# Patient Record
Sex: MALE | Race: BLACK OR AFRICAN AMERICAN | NOT HISPANIC OR LATINO | Employment: PART TIME | URBAN - METROPOLITAN AREA
[De-identification: names, ages, dates, MRNs, and addresses within clinical notes are randomized per-mention and may not be internally consistent; named-entity substitution may affect disease eponyms.]

---

## 2017-08-30 ENCOUNTER — CLINICAL SUPPORT (OUTPATIENT)
Dept: OCCUPATIONAL MEDICINE | Facility: CLINIC | Age: 52
End: 2017-08-30

## 2017-08-30 DIAGNOSIS — Z11.1 VISIT FOR TB SKIN TEST: Primary | ICD-10-CM

## 2017-08-30 PROCEDURE — 86580 TB INTRADERMAL TEST: CPT | Mod: S$GLB,,,

## 2017-09-01 ENCOUNTER — CLINICAL SUPPORT (OUTPATIENT)
Dept: OCCUPATIONAL MEDICINE | Facility: CLINIC | Age: 52
End: 2017-09-01

## 2017-09-01 DIAGNOSIS — R76.11 PPD POSITIVE: Primary | ICD-10-CM

## 2018-06-13 ENCOUNTER — OFFICE VISIT (OUTPATIENT)
Dept: URGENT CARE | Facility: CLINIC | Age: 53
End: 2018-06-13
Payer: MEDICAID

## 2018-06-13 VITALS
WEIGHT: 228 LBS | HEART RATE: 78 BPM | TEMPERATURE: 98 F | OXYGEN SATURATION: 97 % | DIASTOLIC BLOOD PRESSURE: 86 MMHG | SYSTOLIC BLOOD PRESSURE: 128 MMHG

## 2018-06-13 DIAGNOSIS — F17.200 CURRENT SMOKER: ICD-10-CM

## 2018-06-13 DIAGNOSIS — M54.41 ACUTE BILATERAL LOW BACK PAIN WITH BILATERAL SCIATICA: Primary | ICD-10-CM

## 2018-06-13 DIAGNOSIS — K21.9 GASTROESOPHAGEAL REFLUX DISEASE, ESOPHAGITIS PRESENCE NOT SPECIFIED: ICD-10-CM

## 2018-06-13 DIAGNOSIS — M54.42 ACUTE BILATERAL LOW BACK PAIN WITH BILATERAL SCIATICA: Primary | ICD-10-CM

## 2018-06-13 PROCEDURE — 99203 OFFICE O/P NEW LOW 30 MIN: CPT | Mod: S$GLB,,, | Performed by: NURSE PRACTITIONER

## 2018-06-13 RX ORDER — DEXAMETHASONE SODIUM PHOSPHATE 100 MG/10ML
7 INJECTION INTRAMUSCULAR; INTRAVENOUS
Status: COMPLETED | OUTPATIENT
Start: 2018-06-13 | End: 2018-06-13

## 2018-06-13 RX ORDER — NAPROXEN 500 MG/1
500 TABLET ORAL 2 TIMES DAILY WITH MEALS
Qty: 28 TABLET | Refills: 0 | Status: SHIPPED | OUTPATIENT
Start: 2018-06-13 | End: 2018-06-27

## 2018-06-13 RX ADMIN — DEXAMETHASONE SODIUM PHOSPHATE 7 MG: 100 INJECTION INTRAMUSCULAR; INTRAVENOUS at 03:06

## 2018-06-13 NOTE — PROGRESS NOTES
Subjective:       Patient ID: Braeden Rowland is a 52 y.o. male.    Vitals:  weight is 103.4 kg (228 lb). His temperature is 97.9 °F (36.6 °C). His blood pressure is 128/86 and his pulse is 78. His oxygen saturation is 97%.     Chief Complaint: Back Pain    Pt has lower back/hip pain that radiates down both legs. Pt says it aches. Pt has hx of pinched nerves and says it feels like that.      Back Pain   This is a new problem. Episode onset: 3-4 days ago. The problem has been gradually worsening since onset. The quality of the pain is described as aching. The pain radiates to the right knee and left knee. The patient is experiencing no pain. The symptoms are aggravated by lying down. Pertinent negatives include no abdominal pain, chest pain, fever or headaches. He has tried NSAIDs for the symptoms. The treatment provided no relief.     Review of Systems   Constitution: Negative for chills and fever.   HENT: Negative for sore throat.    Eyes: Negative for blurred vision.   Cardiovascular: Negative for chest pain.   Respiratory: Negative for shortness of breath.    Skin: Negative for rash.   Musculoskeletal: Positive for back pain. Negative for joint pain.   Gastrointestinal: Negative for abdominal pain, diarrhea, nausea and vomiting.   Neurological: Negative for headaches.   Psychiatric/Behavioral: The patient is not nervous/anxious.    All other systems reviewed and are negative.      Objective:      Physical Exam   Constitutional: He is oriented to person, place, and time. Vital signs are normal. He appears well-developed and well-nourished. He is cooperative.  Non-toxic appearance. He does not have a sickly appearance. He does not appear ill. No distress.   HENT:   Head: Normocephalic and atraumatic.   Right Ear: External ear normal.   Left Ear: External ear normal.   Nose: Nose normal.   Eyes: Conjunctivae and lids are normal.   Neck: Normal range of motion and full passive range of motion without pain. Neck  supple.   Cardiovascular: Normal rate, regular rhythm, normal heart sounds and intact distal pulses.    Pulmonary/Chest: Effort normal and breath sounds normal. No accessory muscle usage. No apnea, no tachypnea and no bradypnea. No respiratory distress. He has no decreased breath sounds. He has no wheezes. He has no rhonchi. He has no rales.   Abdominal: Normal appearance.   Musculoskeletal:        Lumbar back: He exhibits pain. He exhibits normal range of motion, no tenderness, no bony tenderness, no swelling, no edema, no deformity, no laceration, no spasm and normal pulse.        Back:    Neurological: He is alert and oriented to person, place, and time.   Skin: Skin is warm. Capillary refill takes less than 2 seconds. He is not diaphoretic.   Psychiatric: He has a normal mood and affect. His speech is normal and behavior is normal.   Nursing note and vitals reviewed.      Assessment:       1. Acute bilateral low back pain with bilateral sciatica    2. Current smoker    3. Gastroesophageal reflux disease, esophagitis presence not specified        Plan:         Acute bilateral low back pain with bilateral sciatica  -     dexamethasone injection 7 mg; Inject 0.7 mLs (7 mg total) into the muscle one time.  -     naproxen (EC NAPROSYN) 500 MG EC tablet; Take 1 tablet (500 mg total) by mouth 2 (two) times daily with meals.  Dispense: 28 tablet; Refill: 0    Current smoker  -     Ambulatory referral to Smoking Cessation Program    Gastroesophageal reflux disease, esophagitis presence not specified  -     ranitidine (ZANTAC) 150 MG tablet; Take 1 tablet (150 mg total) by mouth nightly.  Dispense: 14 tablet; Refill: 0

## 2018-06-13 NOTE — PATIENT INSTRUCTIONS
Please follow up with your primary care provider if you are not feeling better in 7-10 days.    Please drink plenty of fluids.  Please get plenty of rest.    Please return here or go to the Emergency Department for any concerns or worsening of condition.    If you lose control of your bowel and/or bladder, please go to the nearest Emergency Department immediately.    If you lose sensation in between your legs by your genitalia and/or rectum, please go to the nearest Emergency Department immediately.    If you lose control or sensation of any extremity, please go to the nearest Emergency Department immediately.    Please follow up with your primary care doctor or specialist as needed.    If you  smoke, please stop smoking.    Sciatica    Sciatica is a condition that causes pain in the lower back that spreads down into the buttock, hip, and leg. Sometimes the leg pain can happen without any back pain. Sciatica happens when a spinal nerve is irritated or has pressure put on it as comes out of the spinal canal in the lower back. This most often happens when a bulge or rupture of a nearby spinal disk presses on the nerve. Sciatica can also be caused by a narrowing of the spinal canal (spinal stenosis) or spasm of the muscle in the buttocks that the sciatic nerve passes through (pyriform muscle). Sciatica is also called lumbar radiculopathy.  Sciatica may begin after a sudden twisting or bending force, such as in a car accident. Or it can happen after a simple awkward movement. In either case, muscle spasm often also happens. Muscle spasm makes the pain worse.  A healthcare provider makes a diagnosis of sciatica from your symptoms and a physical exam. Unless you had an injury from a car accident or fall, you usually wont have X-rays taken at this time. This is because the nerves and disks in your back cant be seen on an X-ray. If the provider sees signs of a compressed nerve, you will need to schedule an MRI scan as an  outpatient. Signs of a compressed nerve include loss of strength in a leg.  Most sciatica gets better with medicine, exercise, and physical therapy. If your symptoms continue after at least 3 months of medical treatment, you may need surgery or injections to your lower back.  Home care  Follow these tips when caring for yourself at home:  · You may need to stay in bed the first few days. But as soon as possible, begin sitting up or walking. This will help you avoid problems that come from staying in bed for long periods.  · When in bed, try to find a position that is comfortable. A firm mattress is best. Try lying flat on your back with pillows under your knees. You can also try lying on your side with your knees bent up toward your chest and a pillow between your knees.  · Avoid sitting for long periods. This puts more stress on your lower back than standing or walking.  · Use heat from a hot shower, hot bath, or heating pad to help ease pain. Massage can also help. You can also try using an ice pack. You can make your own ice pack by putting ice cubes in a plastic bag. Wrap the bag in a thin towel. Try both heat and cold to see which works best. Use the method that feels best for 20 minutes several times a day.  · You may use acetaminophen or ibuprofen to ease pain, unless another pain medicine was prescribed. Note: If you have chronic liver or kidney disease, talk with your healthcare provider before taking these medicines. Also talk with your provider if youve had a stomach ulcer or gastrointestinal bleeding.  · Use safe lifting methods. Dont lift anything heavier than 15 pounds until all of the pain is gone.  Follow-up care  Follow up with your healthcare provider, or as advised. You may need physical therapy or additional tests.  If X-rays were taken, a radiologist will look at them. You will be told of any new findings that may affect your care.  When to seek medical advice  Call your healthcare provider  right away if any of these occur:  · Pain gets worse even after taking prescribed medicine  · Weakness or numbness in 1 or both legs or hips  · Numbness in your groin or genital area  · You cant control your bowel or bladder  · Fever  · Redness or swelling over your back or spine   Date Last Reviewed: 8/1/2016  © 2628-0656 [employer]. [address] [place] [address], [place] [address]. All rights reserved. This information is not intended as a substitute for professional medical care. Always follow your healthcare professional's instructions.

## 2019-01-07 ENCOUNTER — OFFICE VISIT (OUTPATIENT)
Dept: URGENT CARE | Facility: CLINIC | Age: 54
End: 2019-01-07
Payer: MEDICAID

## 2019-01-07 VITALS
WEIGHT: 210 LBS | TEMPERATURE: 98 F | OXYGEN SATURATION: 97 % | BODY MASS INDEX: 29.4 KG/M2 | DIASTOLIC BLOOD PRESSURE: 79 MMHG | SYSTOLIC BLOOD PRESSURE: 120 MMHG | HEIGHT: 71 IN | HEART RATE: 76 BPM

## 2019-01-07 DIAGNOSIS — K21.9 GASTROESOPHAGEAL REFLUX DISEASE WITHOUT ESOPHAGITIS: Primary | ICD-10-CM

## 2019-01-07 DIAGNOSIS — R09.82 POST-NASAL DRIP: ICD-10-CM

## 2019-01-07 DIAGNOSIS — B37.2 YEAST INFECTION OF THE SKIN: ICD-10-CM

## 2019-01-07 PROCEDURE — 99214 PR OFFICE/OUTPT VISIT, EST, LEVL IV, 30-39 MIN: ICD-10-PCS | Mod: S$GLB,,, | Performed by: NURSE PRACTITIONER

## 2019-01-07 PROCEDURE — 99214 OFFICE O/P EST MOD 30 MIN: CPT | Mod: S$GLB,,, | Performed by: NURSE PRACTITIONER

## 2019-01-07 RX ORDER — FLUTICASONE PROPIONATE 50 MCG
1 SPRAY, SUSPENSION (ML) NASAL 2 TIMES DAILY
Qty: 1 BOTTLE | Refills: 0 | Status: SHIPPED | OUTPATIENT
Start: 2019-01-07 | End: 2020-03-06 | Stop reason: ALTCHOICE

## 2019-01-07 RX ORDER — CLOTRIMAZOLE 1 %
CREAM (GRAM) TOPICAL 2 TIMES DAILY
Qty: 45 G | Refills: 0 | Status: SHIPPED | OUTPATIENT
Start: 2019-01-07 | End: 2020-03-06 | Stop reason: ALTCHOICE

## 2019-01-07 NOTE — PROGRESS NOTES
"Subjective:       Patient ID: Braeden Rowland is a 53 y.o. male.    Vitals:  height is 5' 11" (1.803 m) and weight is 95.3 kg (210 lb). His temperature is 97.6 °F (36.4 °C). His blood pressure is 120/79 and his pulse is 76. His oxygen saturation is 97%.     Chief Complaint: Abdominal Pain    Pt reports upper abd pain that began two weeks ago. Pt reports it is worse after eating heavy meals. Pt denies any n/v/d. Pt also reports a yeast infection "between his legs".      Abdominal Pain   This is a new problem. The current episode started 1 to 4 weeks ago. The onset quality is sudden. The problem occurs intermittently. The problem has been unchanged. The pain is located in the epigastric region. The pain is at a severity of 5/10. The pain is mild. The quality of the pain is aching and burning. Pertinent negatives include no constipation, diarrhea, dysuria, fever, nausea or vomiting. Nothing aggravates the pain. The pain is relieved by nothing. He has tried nothing for the symptoms. There is no history of abdominal surgery.       Constitution: Negative for appetite change, chills, sweating and fever.   HENT: Positive for sore throat.    Cardiovascular: Negative for chest pain.   Respiratory: Negative for shortness of breath.    Gastrointestinal: Positive for abdominal pain. Negative for abdominal trauma, abdominal bloating, history of abdominal surgery, nausea, vomiting, constipation, diarrhea, dark colored stools and heartburn.   Genitourinary: Negative for dysuria and pelvic pain.   Musculoskeletal: Negative for back pain.   Skin: Positive for erythema.       Objective:      Physical Exam   Constitutional: He is oriented to person, place, and time. He appears well-developed and well-nourished.   HENT:   Head: Normocephalic and atraumatic. Head is without abrasion, without contusion and without laceration.   Right Ear: External ear normal.   Left Ear: External ear normal.   Nose: Nose normal.   Mouth/Throat: Mucous " membranes are normal. Posterior oropharyngeal erythema present. No oropharyngeal exudate or posterior oropharyngeal edema.   +cobblestoning    Eyes: Conjunctivae, EOM and lids are normal. Pupils are equal, round, and reactive to light.   Neck: Trachea normal, full passive range of motion without pain and phonation normal. Neck supple.   Cardiovascular: Normal rate, regular rhythm and normal heart sounds.   Pulmonary/Chest: Effort normal and breath sounds normal. No stridor. No respiratory distress.   Abdominal: Soft. Normal appearance and bowel sounds are normal. He exhibits no distension, no abdominal bruit, no pulsatile midline mass and no mass. There is no tenderness. There is no rigidity, no rebound, no guarding, no CVA tenderness, no tenderness at McBurney's point and negative Peters's sign.       -TTP   Musculoskeletal: Normal range of motion. He exhibits no edema.   Neurological: He is alert and oriented to person, place, and time. He has normal strength.   Skin: Skin is warm, dry and intact. Capillary refill takes less than 2 seconds. No abrasion, no bruising, no burn, no ecchymosis, no laceration, no lesion and no rash noted. He is not diaphoretic. There is erythema. No pallor.        Psychiatric: He has a normal mood and affect. His speech is normal and behavior is normal. Judgment and thought content normal. Cognition and memory are normal.   Nursing note and vitals reviewed.      Assessment:       1. Gastroesophageal reflux disease without esophagitis    2. Yeast infection of the skin    3. Post-nasal drip        Plan:         Gastroesophageal reflux disease without esophagitis  -     ranitidine (ZANTAC) 150 MG tablet; Take 1 tablet (150 mg total) by mouth nightly.  Dispense: 30 tablet; Refill: 0    Yeast infection of the skin  -     clotrimazole (LOTRIMIN) 1 % cream; Apply topically 2 (two) times daily.  Dispense: 45 g; Refill: 0    Post-nasal drip  -     fluticasone (FLONASE) 50 mcg/actuation nasal  spray; 1 spray (50 mcg total) by Each Nare route 2 (two) times daily.  Dispense: 1 Bottle; Refill: 0      Patient Instructions       USE CREAM TWICE A DAY- KEEP AREA DRY    TAKE ZANTAC AT NIGHT FOR REFLEX    USE NASAL SPRAY MORNING AND NIGHT    FOLLOW UP WITH PCP    GERD (Adult)    The esophagus is a tube that carries food from the mouth to the stomach. A valve at the lower end of the esophagus prevents stomach acid from flowing upward. When this valve doesn't work properly, stomach contents may repeatedly flow back up (reflux) into the esophagus. This is called gastroesophageal reflux disease (GERD). GERD can irritate the esophagus. It can cause problems with swallowing or breathing. In severe cases, GERD can cause recurrent pneumonia or other serious problems.  Symptoms of reflux include burning, pressure or sharp pain in the upper abdomen or mid to lower chest. The pain can spread to the neck, back, or shoulder. There may be belching, an acid taste in the back of the throat, chronic cough, or sore throat or hoarseness. GERD symptoms often occur during the day after a big meal. They can also occur at night when lying down.   Home care  Lifestyle changes can help reduce symptoms. If needed, medicines may be prescribed. Symptoms often improve with treatment, but if treatment is stopped, the symptoms often return after a few months. So most persons with GERD will need to continue treatment.  Lifestyle changes  · Limit or avoid fatty, fried, and spicy foods, as well as coffee, chocolate, mint, and foods with high acid content such as tomatoes and citrus fruit and juices (orange, grapefruit, lemon).  · Dont eat large meals, especially at night. Frequent, smaller meals are best. Do not lie down right after eating. And dont eat anything 3 hours before going to bed.  · Avoid drinking alcohol and smoking. As much as possible, stay away from second hand smoke.  · If you are overweight, losing weight will reduce  "symptoms.   · Avoid wearing tight clothing around your stomach area.  · If your symptoms occur during sleep, use a foam wedge to elevate your upper body (not just your head.) Or, place 4" blocks under the head of your bed.  Medicines  If needed, medicines can help relieve the symptoms of GERD and prevent damage to the esophagus. Discuss a medicine plan with your healthcare provider. This may include one or more of the following medicines:  · Antacids to help neutralize the normal acids in your stomach.  · Acid blockers (H2 blockers) to decrease acid production.  · Acid inhibitors (PPIs) to decrease acid production in a different way than the blockers. They may work better, but can take a little longer to take effect.  Take an antacid 30-60 minutes after eating and at bedtime, but not at the same time as an acid blocker.  Try not to take medicines such as ibuprofen and aspirin. If you are taking aspirin for your heart or other medical reasons, talk to your healthcare provider about stopping it.  Follow-up care  Follow up with your healthcare provider or as advised by our staff.  When to seek medical advice  Call your healthcare provider if any of the following occur:  · Stomach pain gets worse or moves to the lower right abdomen (appendix area)  · Chest pain appears or gets worse, or spreads to the back, neck, shoulder, or arm  · Frequent vomiting (cant keep down liquids)  · Blood in the stool or vomit (red or black in color)  · Feeling weak or dizzy  · Fever of 100.4ºF (38ºC) or higher, or as directed by your healthcare provider  Date Last Reviewed: 6/23/2015  © 2196-8931 [employer], [employer] [address] [place] [address], [place] [address]. All rights reserved. This information is not intended as a substitute for professional medical care. Always follow your healthcare professional's instructions.        Tips to Control Acid Reflux    To control acid reflux, youll need to make some basic diet and lifestyle " changes. The simple steps outlined below may be all youll need to ease discomfort.  Watch what you eat  · Avoid fatty foods and spicy foods.  · Eat fewer acidic foods, such as citrus and tomato-based foods. These can increase symptoms.  · Limit drinking alcohol, caffeine, and fizzy beverages. All increase acid reflux.  · Try limiting chocolate, peppermint, and spearmint. These can worsen acid reflux in some people.  Watch when you eat  · Avoid lying down for 3 hours after eating.  · Do not snack before going to bed.  Raise your head  Raising your head and upper body by 4 to 6 inches helps limit reflux when youre lying down. Put blocks under the head of your bed frame to raise it.  Other changes  · Lose weight, if you need to  · Dont exercise near bedtime  · Avoid tight-fitting clothes  · Limit aspirin and ibuprofen  · Stop smoking   Date Last Reviewed: 7/1/2016  © 1776-6602 [employer], [employer]. [address] [place] [address], [place] [address]. All rights reserved. This information is not intended as a substitute for professional medical care. Always follow your healthcare professional's instructions.

## 2019-02-02 DIAGNOSIS — R09.82 POST-NASAL DRIP: ICD-10-CM

## 2019-02-02 RX ORDER — FLUTICASONE PROPIONATE 50 MCG
SPRAY, SUSPENSION (ML) NASAL
Qty: 15.8 ML | Refills: 0 | OUTPATIENT
Start: 2019-02-02

## 2019-02-06 DIAGNOSIS — K21.9 GASTROESOPHAGEAL REFLUX DISEASE WITHOUT ESOPHAGITIS: ICD-10-CM

## 2019-09-18 ENCOUNTER — HOSPITAL ENCOUNTER (EMERGENCY)
Facility: HOSPITAL | Age: 54
Discharge: HOME | End: 2019-09-18
Attending: EMERGENCY MEDICINE
Payer: MEDICAID

## 2019-09-18 VITALS
HEART RATE: 57 BPM | DIASTOLIC BLOOD PRESSURE: 72 MMHG | HEIGHT: 71 IN | BODY MASS INDEX: 35 KG/M2 | RESPIRATION RATE: 18 BRPM | SYSTOLIC BLOOD PRESSURE: 124 MMHG | OXYGEN SATURATION: 98 % | TEMPERATURE: 98 F | WEIGHT: 250 LBS

## 2019-09-18 DIAGNOSIS — R07.9 CHEST PAIN, UNSPECIFIED TYPE: Primary | ICD-10-CM

## 2019-09-18 DIAGNOSIS — R07.9 CHEST PAIN: ICD-10-CM

## 2019-09-18 LAB
ALBUMIN SERPL BCP-MCNC: 4 G/DL (ref 3.5–5.2)
ALBUMIN SERPL BCP-MCNC: 4.1 G/DL (ref 3.5–5.2)
ALP SERPL-CCNC: 70 U/L (ref 55–135)
ALP SERPL-CCNC: 78 U/L (ref 55–135)
ALT SERPL W/O P-5'-P-CCNC: 16 U/L (ref 10–44)
ALT SERPL W/O P-5'-P-CCNC: 17 U/L (ref 10–44)
ANION GAP SERPL CALC-SCNC: 8 MMOL/L (ref 8–16)
ANION GAP SERPL CALC-SCNC: 8 MMOL/L (ref 8–16)
AST SERPL-CCNC: 17 U/L (ref 10–40)
AST SERPL-CCNC: 18 U/L (ref 10–40)
BASOPHILS # BLD AUTO: 0.03 K/UL (ref 0–0.2)
BASOPHILS NFR BLD: 0.4 % (ref 0–1.9)
BILIRUB SERPL-MCNC: 0.2 MG/DL (ref 0.1–1)
BILIRUB SERPL-MCNC: 0.3 MG/DL (ref 0.1–1)
BNP SERPL-MCNC: <10 PG/ML (ref 0–99)
BUN SERPL-MCNC: 8 MG/DL (ref 6–20)
BUN SERPL-MCNC: 9 MG/DL (ref 6–20)
CALCIUM SERPL-MCNC: 10.2 MG/DL (ref 8.7–10.5)
CALCIUM SERPL-MCNC: 9.8 MG/DL (ref 8.7–10.5)
CHLORIDE SERPL-SCNC: 103 MMOL/L (ref 95–110)
CHLORIDE SERPL-SCNC: 104 MMOL/L (ref 95–110)
CO2 SERPL-SCNC: 28 MMOL/L (ref 23–29)
CO2 SERPL-SCNC: 30 MMOL/L (ref 23–29)
CREAT SERPL-MCNC: 1 MG/DL (ref 0.5–1.4)
CREAT SERPL-MCNC: 1 MG/DL (ref 0.5–1.4)
DIFFERENTIAL METHOD: NORMAL
EOSINOPHIL # BLD AUTO: 0.1 K/UL (ref 0–0.5)
EOSINOPHIL NFR BLD: 1.3 % (ref 0–8)
ERYTHROCYTE [DISTWIDTH] IN BLOOD BY AUTOMATED COUNT: 12.8 % (ref 11.5–14.5)
EST. GFR  (AFRICAN AMERICAN): >60 ML/MIN/1.73 M^2
EST. GFR  (AFRICAN AMERICAN): >60 ML/MIN/1.73 M^2
EST. GFR  (NON AFRICAN AMERICAN): >60 ML/MIN/1.73 M^2
EST. GFR  (NON AFRICAN AMERICAN): >60 ML/MIN/1.73 M^2
GLUCOSE SERPL-MCNC: 103 MG/DL (ref 70–110)
GLUCOSE SERPL-MCNC: 97 MG/DL (ref 70–110)
HCT VFR BLD AUTO: 44.5 % (ref 40–54)
HGB BLD-MCNC: 14.4 G/DL (ref 14–18)
LIPASE SERPL-CCNC: 49 U/L (ref 4–60)
LYMPHOCYTES # BLD AUTO: 1.6 K/UL (ref 1–4.8)
LYMPHOCYTES NFR BLD: 19 % (ref 18–48)
MCH RBC QN AUTO: 29.1 PG (ref 27–31)
MCHC RBC AUTO-ENTMCNC: 32.4 G/DL (ref 32–36)
MCV RBC AUTO: 90 FL (ref 82–98)
MONOCYTES # BLD AUTO: 0.6 K/UL (ref 0.3–1)
MONOCYTES NFR BLD: 7.3 % (ref 4–15)
NEUTROPHILS # BLD AUTO: 6 K/UL (ref 1.8–7.7)
NEUTROPHILS NFR BLD: 72.1 % (ref 38–73)
PLATELET # BLD AUTO: 261 K/UL (ref 150–350)
PMV BLD AUTO: 10.7 FL (ref 9.2–12.9)
POTASSIUM SERPL-SCNC: 4 MMOL/L (ref 3.5–5.1)
POTASSIUM SERPL-SCNC: 4.9 MMOL/L (ref 3.5–5.1)
PROT SERPL-MCNC: 7.8 G/DL (ref 6–8.4)
PROT SERPL-MCNC: 8 G/DL (ref 6–8.4)
RBC # BLD AUTO: 4.94 M/UL (ref 4.6–6.2)
SODIUM SERPL-SCNC: 140 MMOL/L (ref 136–145)
SODIUM SERPL-SCNC: 141 MMOL/L (ref 136–145)
TROPONIN I SERPL DL<=0.01 NG/ML-MCNC: 0.01 NG/ML (ref 0–0.03)
TROPONIN I SERPL DL<=0.01 NG/ML-MCNC: <0.006 NG/ML (ref 0–0.03)
WBC # BLD AUTO: 8.35 K/UL (ref 3.9–12.7)

## 2019-09-18 PROCEDURE — 83880 ASSAY OF NATRIURETIC PEPTIDE: CPT

## 2019-09-18 PROCEDURE — 93010 ELECTROCARDIOGRAM REPORT: CPT | Mod: ,,, | Performed by: INTERNAL MEDICINE

## 2019-09-18 PROCEDURE — 85025 COMPLETE CBC W/AUTO DIFF WBC: CPT

## 2019-09-18 PROCEDURE — 93005 ELECTROCARDIOGRAM TRACING: CPT

## 2019-09-18 PROCEDURE — 99285 EMERGENCY DEPT VISIT HI MDM: CPT | Mod: 25

## 2019-09-18 PROCEDURE — 93010 EKG 12-LEAD: ICD-10-PCS | Mod: ,,, | Performed by: INTERNAL MEDICINE

## 2019-09-18 PROCEDURE — 25000003 PHARM REV CODE 250: Performed by: EMERGENCY MEDICINE

## 2019-09-18 PROCEDURE — 80053 COMPREHEN METABOLIC PANEL: CPT | Mod: 91

## 2019-09-18 PROCEDURE — 83690 ASSAY OF LIPASE: CPT

## 2019-09-18 PROCEDURE — 84484 ASSAY OF TROPONIN QUANT: CPT | Mod: 91

## 2019-09-18 RX ORDER — OMEPRAZOLE 40 MG/1
40 CAPSULE, DELAYED RELEASE ORAL DAILY
COMMUNITY
End: 2020-03-06

## 2019-09-18 RX ORDER — HYDROMORPHONE HYDROCHLORIDE 2 MG/ML
1 INJECTION, SOLUTION INTRAMUSCULAR; INTRAVENOUS; SUBCUTANEOUS
Status: DISCONTINUED | OUTPATIENT
Start: 2019-09-18 | End: 2019-09-18

## 2019-09-18 RX ORDER — LABETALOL HYDROCHLORIDE 5 MG/ML
10 INJECTION, SOLUTION INTRAVENOUS
Status: DISCONTINUED | OUTPATIENT
Start: 2019-09-18 | End: 2019-09-18

## 2019-09-18 RX ORDER — NAPROXEN SODIUM 220 MG/1
162 TABLET, FILM COATED ORAL
Status: COMPLETED | OUTPATIENT
Start: 2019-09-18 | End: 2019-09-18

## 2019-09-18 RX ADMIN — ASPIRIN 81 MG 162 MG: 81 TABLET ORAL at 04:09

## 2019-09-18 NOTE — ED PROVIDER NOTES
"Encounter Date: 9/18/2019    SCRIBE #1 NOTE: I, John Rees, am scribing for, and in the presence of,  Casey Bennett MD. I have scribed the following portions of the note - Other sections scribed: HPI, ROS, PE.       History     Chief Complaint   Patient presents with    Chest Pain     pt reports chest pains starting one hour PTA. "It feels like my heart is pounding" denies cardiac hx, denies SOB at this time     CC: Chest Pain    HPI: 54 y/o male, with a PMHx of acid reflux and HLD, presents to the ED c/o chest pain. Pt reports that while sitting down watching TV, approximately 1x hour PTA, he suddenly felt dizzy and states that his chest was hurting and his heart was "pounding". Additionally, pt adds that the chest pain radiated to his shoulder. Pt notes that these symptoms lasted for approximately 15x mins. Pt denies any similar sxs previously. Pt attempted tx using Xavi aspirin. Pt notes that he is an occasional drinker (beer) and that he smokes (0.5 packs per day).     The history is provided by the patient. No  was used.     Review of patient's allergies indicates:  No Known Allergies  Past Medical History:   Diagnosis Date    Hyperlipidemia     used to take medication     No past surgical history on file.  No family history on file.  Social History     Tobacco Use    Smoking status: Smoker, Current Status Unknown    Smokeless tobacco: Never Used   Substance Use Topics    Alcohol use: No    Drug use: No     Review of Systems   Constitutional: Negative.    HENT: Negative.    Eyes: Negative.    Respiratory: Negative.    Cardiovascular: Positive for palpitations.   Gastrointestinal: Negative.    Genitourinary: Negative.    Musculoskeletal:        (+) shoulder pain   Skin: Negative.    Neurological: Positive for dizziness.       Physical Exam     Initial Vitals [09/18/19 1542]   BP Pulse Resp Temp SpO2   128/76 78 18 98.4 °F (36.9 °C) 98 %      MAP       --     "     Physical Exam    Nursing note and vitals reviewed.  Constitutional: He appears well-developed and well-nourished. He is not diaphoretic. No distress.   HENT:   Head: Normocephalic and atraumatic.   Nose: Nose normal.   Mouth/Throat: No oropharyngeal exudate.   Eyes: EOM are normal. Pupils are equal, round, and reactive to light.   Neck: Normal range of motion. Neck supple. No tracheal deviation present. No JVD present.   Cardiovascular: Normal rate, regular rhythm and normal heart sounds.   No murmur heard.  Pulmonary/Chest: Breath sounds normal. No respiratory distress. He has no wheezes. He has no rhonchi. He has no rales.   Abdominal: Soft. Bowel sounds are normal. He exhibits no distension. There is no tenderness. There is no rebound and no guarding.   Musculoskeletal: Normal range of motion. He exhibits no edema or tenderness.   Patient has no chest wall tenderness.    Neurological: He is alert and oriented to person, place, and time. He has normal strength. No cranial nerve deficit. GCS score is 15. GCS eye subscore is 4. GCS verbal subscore is 5. GCS motor subscore is 6.   Skin: Skin is warm and dry. Capillary refill takes less than 2 seconds. No rash noted. No erythema.         ED Course   Procedures  Labs Reviewed   COMPREHENSIVE METABOLIC PANEL - Abnormal; Notable for the following components:       Result Value    CO2 30 (*)     All other components within normal limits   CBC W/ AUTO DIFFERENTIAL   TROPONIN I   B-TYPE NATRIURETIC PEPTIDE   LIPASE   COMPREHENSIVE METABOLIC PANEL   TROPONIN I        ECG Results          EKG 12-lead (Final result)  Result time 09/19/19 17:28:04    Final result by Interface, Lab In [facility] (09/19/19 17:28:04)                 Narrative:    Test Reason : R07.9,    Vent. Rate : 075 BPM     Atrial Rate : 075 BPM     P-R Int : 152 ms          QRS Dur : 080 ms      QT Int : 366 ms       P-R-T Axes : 072 041 039 degrees     QTc Int : 408 ms    Normal sinus rhythm  Nonspecific T  wave abnormality  Abnormal ECG  No previous ECGs available  Confirmed by Frantz HERNANDEZ, Misael ([id number]) on 9/19/2019 5:27:56 PM    Referred By: [id number]   SELF           Confirmed By:Misael Landry MD                            Imaging Results          X-Ray Chest AP Portable (Final result)  Result time 09/18/19 16:20:11    Final result by Misael Church MD (09/18/19 16:20:11)                 Impression:      See above      Electronically signed by: Misael Church MD  Date:    09/18/2019  Time:    16:20             Narrative:    EXAMINATION:  XR CHEST AP PORTABLE    CLINICAL HISTORY:  Chest Pain;    TECHNIQUE:  Single frontal view of the chest was performed.    COMPARISON:  09/01/2017    FINDINGS:  Heart and lungs are normal.  No infiltrate or cardiac failure.                                MDM:    53 y.o.male with PMHx as noted above, presents with chest pain. Physical exam remarkable for unremarkable exam, CTAB, RRR, abdomen soft, nt/nd.  ED workup remarkable for EKG - nsr, no ischemic changes compared to prior, no STEMI, trop - neg/neg, BNP - wnl, CBC/CMP - wnl, CXR - unremarkable.  Pt presentation consistent with atypical chest pain, HEART 3, delta trop negative, [facility] information and cardiology referral placed today.  At this time given patient's history, physical exam, and ED workup do not suspect arrhythmia, MI/ACS, PE, PTX, aortic dissection, pericarditis, PNA, shingles, or any further malignant cause.  Pt treated with ASA initially, with no further sxms reported.  Discussed diagnosis and further treatment with patient, return precautions given and all questions answered.  Patient in understanding of plan.  Pt discharged to home improved and stable.             Scribe Attestation:   Scribe #1: I performed the above scribed service and the documentation accurately describes the services I performed. I attest to the accuracy of the note.               Clinical Impression:       ICD-10-CM  ICD-9-CM   1. Chest pain, unspecified type R07.9 786.50   2. Chest pain R07.9 786.50                       I, Casey Bennett M.D., personally performed the services described in this documentation. All medical record entries made by the scribe were at my direction and in my presence.  I have reviewed the chart and agree that the record reflects my personal performance and is accurate and complete.         Casey Bennett MD  09/21/19 0011

## 2019-09-18 NOTE — ED TRIAGE NOTES
Pt arrived to the ED due to left anterior chest pain that radiates to the pt's left shoulder, dizziness, and SOB that started about an hour ago after the pt smoked a cigarette. Pt denies any cardiac hx. Pt reports that he stopped smoking for 6 months but recently has smoked a couple cigarettes over the past few days

## 2019-12-03 ENCOUNTER — OFFICE VISIT (OUTPATIENT)
Dept: URGENT CARE | Facility: CLINIC | Age: 54
End: 2019-12-03
Payer: MEDICAID

## 2019-12-03 VITALS
RESPIRATION RATE: 18 BRPM | TEMPERATURE: 98 F | OXYGEN SATURATION: 97 % | SYSTOLIC BLOOD PRESSURE: 121 MMHG | WEIGHT: 250 LBS | HEIGHT: 71 IN | DIASTOLIC BLOOD PRESSURE: 70 MMHG | HEART RATE: 85 BPM | BODY MASS INDEX: 35 KG/M2

## 2019-12-03 DIAGNOSIS — K21.9 GASTROESOPHAGEAL REFLUX DISEASE, ESOPHAGITIS PRESENCE NOT SPECIFIED: ICD-10-CM

## 2019-12-03 DIAGNOSIS — M54.32 BILATERAL SCIATICA: Primary | ICD-10-CM

## 2019-12-03 DIAGNOSIS — M54.31 BILATERAL SCIATICA: Primary | ICD-10-CM

## 2019-12-03 DIAGNOSIS — H00.012 HORDEOLUM EXTERNUM OF RIGHT LOWER EYELID: ICD-10-CM

## 2019-12-03 PROCEDURE — 99214 PR OFFICE/OUTPT VISIT, EST, LEVL IV, 30-39 MIN: ICD-10-PCS | Mod: S$GLB,,, | Performed by: PHYSICIAN ASSISTANT

## 2019-12-03 PROCEDURE — 99214 OFFICE O/P EST MOD 30 MIN: CPT | Mod: S$GLB,,, | Performed by: PHYSICIAN ASSISTANT

## 2019-12-03 RX ORDER — ERYTHROMYCIN 5 MG/G
OINTMENT OPHTHALMIC 3 TIMES DAILY
Qty: 1 G | Refills: 0 | Status: SHIPPED | OUTPATIENT
Start: 2019-12-03 | End: 2020-03-06

## 2019-12-03 RX ORDER — DEXAMETHASONE SODIUM PHOSPHATE 100 MG/10ML
8 INJECTION INTRAMUSCULAR; INTRAVENOUS
Status: COMPLETED | OUTPATIENT
Start: 2019-12-03 | End: 2019-12-03

## 2019-12-03 RX ORDER — NAPROXEN 500 MG/1
500 TABLET ORAL 2 TIMES DAILY WITH MEALS
Qty: 20 TABLET | Refills: 0 | Status: SHIPPED | OUTPATIENT
Start: 2019-12-03 | End: 2019-12-13

## 2019-12-03 RX ORDER — PANTOPRAZOLE SODIUM 20 MG/1
20 TABLET, DELAYED RELEASE ORAL DAILY
Qty: 30 TABLET | Refills: 11 | Status: SHIPPED | OUTPATIENT
Start: 2019-12-03 | End: 2020-08-22

## 2019-12-03 RX ADMIN — DEXAMETHASONE SODIUM PHOSPHATE 8 MG: 100 INJECTION INTRAMUSCULAR; INTRAVENOUS at 05:12

## 2019-12-03 NOTE — PROGRESS NOTES
"Subjective:       Patient ID: Braeden Rowland is a 54 y.o. male.    Vitals:  height is 5' 11" (1.803 m) and weight is 113.4 kg (250 lb). His temperature is 97.5 °F (36.4 °C). His blood pressure is 121/70 and his pulse is 85. His respiration is 18 and oxygen saturation is 97%.     Chief Complaint: Leg Pain    Patient presents with bilateral leg pain that began within the past 24 hr.  Reports the pain as a burning and aching pain that radiates from his low back all the way down to his ankles.  Denies any difficulty walking or with range of motion.  Patient denies any accident or injury that could of caused this flare.  Patient states is similar to a former sciatic flares that he has had.  Patient also reports is hard nodule that is mildly tender on his right lower eyelid.  Denies any draining or oozing.  Denies any sinus congestion or cough.  Afebrile.  Denies any changes in vision.  Patient also reports some stomach burning as well as belching.  Denies any nausea, vomiting, diarrhea or constipation.    Leg Pain    The incident occurred 12 to 24 hours ago. The incident occurred at home. There was no injury mechanism. The pain is present in the right thigh and left thigh. The quality of the pain is described as aching. The pain is at a severity of 5/10. The pain is mild. The pain has been constant since onset. The symptoms are aggravated by movement and weight bearing. He has tried nothing for the symptoms. The treatment provided no relief.       Constitution: Negative for chills, fatigue and fever.   HENT: Negative for congestion and sore throat.    Neck: Negative for painful lymph nodes.   Cardiovascular: Negative for chest pain and leg swelling.   Eyes: Negative for double vision and blurred vision.   Respiratory: Negative for cough and shortness of breath.    Gastrointestinal: Negative for nausea, vomiting and diarrhea.   Genitourinary: Negative for dysuria, frequency and urgency.   Musculoskeletal: Positive for " joint pain and pain with walking. Negative for joint swelling, muscle cramps and muscle ache.   Skin: Negative for color change, pale and rash.   Allergic/Immunologic: Negative for seasonal allergies.   Neurological: Negative for dizziness, history of vertigo, light-headedness, passing out and headaches.   Hematologic/Lymphatic: Negative for swollen lymph nodes, easy bruising/bleeding and history of blood clots. Does not bruise/bleed easily.   Psychiatric/Behavioral: Negative for nervous/anxious, sleep disturbance and depression. The patient is not nervous/anxious.        Objective:      Physical Exam   Constitutional: He is oriented to person, place, and time. Vital signs are normal. He appears well-developed and well-nourished. He is active and cooperative.  Non-toxic appearance. He does not have a sickly appearance. He does not appear ill. No distress.   HENT:   Head: Normocephalic and atraumatic.   Right Ear: Hearing, tympanic membrane, external ear and ear canal normal. Tympanic membrane is not erythematous.   Left Ear: Hearing, tympanic membrane, external ear and ear canal normal. Tympanic membrane is not erythematous.   Nose: Nose normal. No mucosal edema, rhinorrhea or nasal deformity. No epistaxis. Right sinus exhibits no maxillary sinus tenderness and no frontal sinus tenderness. Left sinus exhibits no maxillary sinus tenderness and no frontal sinus tenderness.   Mouth/Throat: Uvula is midline, oropharynx is clear and moist and mucous membranes are normal. No trismus in the jaw. Normal dentition. No uvula swelling. No oropharyngeal exudate, posterior oropharyngeal edema, posterior oropharyngeal erythema or cobblestoning. No tonsillar exudate.   Eyes: Pupils are equal, round, and reactive to light. Conjunctivae, EOM and lids are normal. Right eye exhibits hordeolum. Right eye exhibits no chemosis, no discharge and no exudate. No foreign body present in the right eye. Left eye exhibits no chemosis, no  discharge, no exudate and no hordeolum. No foreign body present in the left eye. Right conjunctiva is not injected. Right conjunctiva has no hemorrhage. Left conjunctiva is not injected. Left conjunctiva has no hemorrhage. No scleral icterus. Right eye exhibits normal extraocular motion and no nystagmus. Left eye exhibits normal extraocular motion and no nystagmus. Right pupil is round and reactive. Left pupil is round and reactive. Pupils are equal.       Neck: Trachea normal, normal range of motion, full passive range of motion without pain and phonation normal. Neck supple. No neck rigidity. No edema and no erythema present.   Cardiovascular: Normal rate, regular rhythm, normal heart sounds, intact distal pulses and normal pulses. Exam reveals no gallop and no friction rub.   No murmur heard.  Pulmonary/Chest: Effort normal and breath sounds normal. No stridor. No respiratory distress. He has no decreased breath sounds. He has no wheezes. He has no rhonchi. He has no rales.   Abdominal: Soft. Normal appearance and bowel sounds are normal. He exhibits no distension, no abdominal bruit, no pulsatile midline mass and no mass. There is no hepatosplenomegaly. There is tenderness in the epigastric area. There is no rigidity, no rebound, no guarding, no CVA tenderness, no tenderness at McBurney's point and negative Peters's sign.   Musculoskeletal: Normal range of motion. He exhibits no edema or deformity.        Lumbar back: He exhibits normal range of motion, no tenderness, no bony tenderness, no swelling, no edema, no deformity, no laceration, no pain, no spasm and normal pulse.        Right upper leg: He exhibits no tenderness, no bony tenderness, no swelling, no edema, no deformity and no laceration.        Left upper leg: He exhibits no tenderness, no bony tenderness, no swelling, no edema, no deformity and no laceration.        Right lower leg: He exhibits no tenderness, no bony tenderness, no swelling, no edema,  no deformity and no laceration.        Left lower leg: He exhibits no tenderness, no bony tenderness, no swelling, no edema, no deformity and no laceration.        Right foot: There is normal range of motion, no tenderness, no bony tenderness, no swelling, normal capillary refill, no crepitus, no deformity and no laceration.        Left foot: There is normal range of motion, no tenderness, no bony tenderness, no swelling, normal capillary refill, no crepitus, no deformity and no laceration.   Positive ST LEG RAISE bilaterally  FULL ROM B LE WITH 5/5 STRENGTH  2+DTR PATELLA AND ACHILLES  NVIT DISTALLY WITH SILT AND 2+BCR  ABLE TO AMBULATE WITH SMOOTH RHYTHMIC GAIT     Neurological: He is alert and oriented to person, place, and time. He has normal strength and normal reflexes. No sensory deficit. He exhibits normal muscle tone. Coordination normal.   Alert, oriented x 3. EOMI, PERRLA. Cranial nerves intact: facial expressions (smile, raising eyebrows, shutting eyes, pursed lips) symmetric. Shoulder shrug strength 5/5; sternocleidomastoid muscle strength 5/5 bilaterally. Jaw is midline without deviation. Tongue protrudes at midline without fasciculations. Sensation to face in distribution of CN V1, V2, and V3 intact. Sensation to upper and lower extremities intact. Finger to nose, rapid rhythmic alternating movements, and heel to shin test are intact and smooth bilaterally. Patient ambulates unassisted without rigidity or ataxia. Romberg negative. Voice quality, comprehension, articulation, coherence assessed as appropriate.      Skin: Skin is warm, dry, intact, not diaphoretic and not pale. not left upper leg, not right upper leg, not left lower leg, not right lower leg, not left foot and not right foot  Psychiatric: He has a normal mood and affect. His speech is normal and behavior is normal. Judgment and thought content normal. Cognition and memory are normal.   Nursing note and vitals reviewed.        Assessment:        1. Bilateral sciatica    2. Gastroesophageal reflux disease, esophagitis presence not specified    3. Hordeolum externum of right lower eyelid        Plan:     Decadron injection given at time of visit and patient tolerated well.  Also prescribed naproxen for anti-inflammatory relief of pain in bilateral legs.  Prescribed Protonix for resolution of his reflux and described producing any alcohol intake as well as refraining from smoking.  For stye instructed that he can use warm compresses as well as his antibiotic ointment for resolution.  Should symptoms persist or worsen follow-up with primary care provider    Bilateral sciatica  -     dexamethasone injection 8 mg  -     naproxen (NAPROSYN) 500 MG tablet; Take 1 tablet (500 mg total) by mouth 2 (two) times daily with meals. for 10 days  Dispense: 20 tablet; Refill: 0    Gastroesophageal reflux disease, esophagitis presence not specified  -     pantoprazole (PROTONIX) 20 MG tablet; Take 1 tablet (20 mg total) by mouth once daily.  Dispense: 30 tablet; Refill: 11    Hordeolum externum of right lower eyelid  -     erythromycin (ROMYCIN) ophthalmic ointment; Place into the left eye 3 (three) times daily.  Dispense: 1 g; Refill: 0      Patient Instructions       Discharge Instructions for Gastroesophageal Reflux Disease (GERD)  Gastroesophageal reflux disease (GERD) is a backflow of acid from the stomach into the swallowing tube (esophagus).  Home care  These home care steps can help you manage GERD:  · Maintain a healthy weight. Get help to lose any extra pounds.  · Avoid lying down after meals.  · Avoid eating late at night.  · Elevate the head of your bed by 6 inches. You can do this by placing wooden blocks or bed risers under the head of your bed.  · Avoid wearing tight-fitting clothes.  · Avoid foods that might irritate your stomach, such as the following:  ¨ Alcohol  ¨ Fat  ¨ Chocolate  ¨ Caffeine  ¨ Spearmint or peppermint  · Talk to your healthcare  provider if you are taking any of the following medicines. These medicines can make GERD symptoms worse:  ¨ Calcium channel blockers  ¨ Theophylline  ¨ Anticholinergic medicines, such as oxybutynin and benzatropine  · Begin an exercise program. Ask your healthcare provider how to get started. You can benefit from simple activities, such as walking or gardening.  · Break the smoking habit. Enroll in a stop-smoking program to improve your chances of success.  · Limit alcohol intake to no more than 2 drinks a day.  · Take your medicines exactly as directed. Dont skip doses.  · Avoid over-the-counter nonsteroidal anti-inflammatory medicines, such as aspirin and ibuprofen, unless recommended by your healthcare provider for certain conditions.   · If possible, avoid nitrates (heart medicines, such as nitroglycerin and isosorbide dinitrate ).  Follow-up care  Make a follow-up appointment as directed by our staff.     When to call the healthcare provider  Call your healthcare provider immediately if you have any of the following:  · Trouble swallowing  · Pain when swallowing  · Feeling of food caught in your chest or throat  · Pain in the neck, chest, or back  · Heartburn that causes you to vomit  · Vomiting blood  · Black or tarry stools (from digested blood)  · More saliva (watering of the mouth) than usual  · Weight loss of more than 3% to 5% of your total body weight in a month  · Hoarseness or sore throat that wont go away  · Choking, coughing, or wheezing   Date Last Reviewed: 7/1/2016  © 1181-9796 [employer]. [address] [place] [address], [place] [address]. All rights reserved. This information is not intended as a substitute for professional medical care. Always follow your healthcare professional's instructions.        Sty (or Stye)  A sty is an infection of the oil gland of the eyelid. It may develop into a small pocket of pus (abscess). This can cause pain, redness, and swelling. In early stages, styes  are treated with antibiotic cream, eye drops, or warm packs (small towels soaked in warm water). More severe cases may need to be opened and drained by a health care provider.  Home care  · Eye drops or ointment are usually prescribed to treat the infection. Use these as directed.   ¨ Artificial tears may also be used to lubricate the eye and make it more comfortable. These may be purchased without a prescription.   ¨ Talk to your health care provider before using any over-the-counter treatment for a sty.  · Apply a warm, damp towel to the affected eye for at least 5 minutes, 3 to 4 times a day for a week. Warm compresses open the pores and speed the healing. If the compresses are too hot, they may burn your eyelid.  · Sometimes the sty will drain with this treatment alone. If this happens, continue the antibiotic until all the redness and swelling are gone.  · Wash your hands before and after touching the infected eye to avoid spreading the infection.  · Do not squeeze or try to puncture the sty.  Follow-up care  Follow up with your health care provider, or as advised.   When to seek medical advice  Call your health care provider right away if you have:  · Increase in swelling or redness around the eyelid after 48 to 72 hours  · Increase in eye pain or the eyelid blisters  · Increase in warmth--the eyelid feels hot  · Drainage of blood or thick pus from the sty  · Blister on the eyelid  · Inability to open the eyelid due to swelling  · Fever  ¨ 1 degree above your normal temperature lasting for 24 to 48 hours, or  ¨ Whatever your health care provider told you to report based on your medical condition  · Vision changes  · Headache or stiff neck  · Recurrence of the sty  Date Last Reviewed: 6/14/2015  © 9615-0322 [employer]. [address] [place] [address], [place] [address]. All rights reserved. This information is not intended as a substitute for professional medical care. Always follow your healthcare  professional's instructions.        Sciatica    Sciatica is a condition that causes pain in the lower back that spreads down into the buttock, hip, and leg. Sometimes the leg pain can happen without any back pain. Sciatica happens when a spinal nerve is irritated or has pressure put on it as comes out of the spinal canal in the lower back. This most often happens when a bulge or rupture of a nearby spinal disk presses on the nerve. Sciatica can also be caused by a narrowing of the spinal canal (spinal stenosis) or spasm of the muscle in the buttocks that the sciatic nerve passes through (pyriform muscle). Sciatica is also called lumbar radiculopathy.  Sciatica may begin after a sudden twisting or bending force, such as in a car accident. Or it can happen after a simple awkward movement. In either case, muscle spasm often also happens. Muscle spasm makes the pain worse.  A healthcare provider makes a diagnosis of sciatica from your symptoms and a physical exam. Unless you had an injury from a car accident or fall, you usually wont have X-rays taken at this time. This is because the nerves and disks in your back cant be seen on an X-ray. If the provider sees signs of a compressed nerve, you will need to schedule an MRI scan as an outpatient. Signs of a compressed nerve include loss of strength in a leg.  Most sciatica gets better with medicine, exercise, and physical therapy. If your symptoms continue after at least 3 months of medical treatment, you may need surgery or injections to your lower back.  Home care  Follow these tips when caring for yourself at home:  · You may need to stay in bed the first few days. But as soon as possible, begin sitting up or walking. This will help you avoid problems that come from staying in bed for long periods.  · When in bed, try to find a position that is comfortable. A firm mattress is best. Try lying flat on your back with pillows under your knees. You can also try lying on  your side with your knees bent up toward your chest and a pillow between your knees.  · Avoid sitting for long periods. This puts more stress on your lower back than standing or walking.  · Use heat from a hot shower, hot bath, or heating pad to help ease pain. Massage can also help. You can also try using an ice pack. You can make your own ice pack by putting ice cubes in a plastic bag. Wrap the bag in a thin towel. Try both heat and cold to see which works best. Use the method that feels best for 20 minutes several times a day.  · You may use acetaminophen or ibuprofen to ease pain, unless another pain medicine was prescribed. Note: If you have chronic liver or kidney disease, talk with your healthcare provider before taking these medicines. Also talk with your provider if youve had a stomach ulcer or gastrointestinal bleeding.  · Use safe lifting methods. Dont lift anything heavier than 15 pounds until all of the pain is gone.  Follow-up care  Follow up with your healthcare provider, or as advised. You may need physical therapy or additional tests.  If X-rays were taken, a radiologist will look at them. You will be told of any new findings that may affect your care.  When to seek medical advice  Call your healthcare provider right away if any of these occur:  · Pain gets worse even after taking prescribed medicine  · Weakness or numbness in 1 or both legs or hips  · Numbness in your groin or genital area  · You cant control your bowel or bladder  · Fever  · Redness or swelling over your back or spine   Date Last Reviewed: 8/1/2016  © 5290-7558 [employer]. [address] [place] [address], [place] [address]. All rights reserved. This information is not intended as a substitute for professional medical care. Always follow your healthcare professional's instructions.      Please follow up with your Primary care provider within 2-5 days if your signs and symptoms have not resolved or worsen.     If your  condition worsens or fails to improve we recommend that you receive another evaluation at the emergency room immediately or contact your primary medical clinic to discuss your concerns.   You must understand that you have received an Urgent Care treatment only and that you may be released before all of your medical problems are known or treated. You, the patient, will arrange for follow up care as instructed.     RED FLAGS/WARNING SYMPTOMS DISCUSSED WITH PATIENT THAT WOULD WARRANT EMERGENT MEDICAL ATTENTION. PATIENT VERBALIZED UNDERSTANDING.

## 2019-12-03 NOTE — PATIENT INSTRUCTIONS
Discharge Instructions for Gastroesophageal Reflux Disease (GERD)  Gastroesophageal reflux disease (GERD) is a backflow of acid from the stomach into the swallowing tube (esophagus).  Home care  These home care steps can help you manage GERD:  · Maintain a healthy weight. Get help to lose any extra pounds.  · Avoid lying down after meals.  · Avoid eating late at night.  · Elevate the head of your bed by 6 inches. You can do this by placing wooden blocks or bed risers under the head of your bed.  · Avoid wearing tight-fitting clothes.  · Avoid foods that might irritate your stomach, such as the following:  ¨ Alcohol  ¨ Fat  ¨ Chocolate  ¨ Caffeine  ¨ Spearmint or peppermint  · Talk to your healthcare provider if you are taking any of the following medicines. These medicines can make GERD symptoms worse:  ¨ Calcium channel blockers  ¨ Theophylline  ¨ Anticholinergic medicines, such as oxybutynin and benzatropine  · Begin an exercise program. Ask your healthcare provider how to get started. You can benefit from simple activities, such as walking or gardening.  · Break the smoking habit. Enroll in a stop-smoking program to improve your chances of success.  · Limit alcohol intake to no more than 2 drinks a day.  · Take your medicines exactly as directed. Dont skip doses.  · Avoid over-the-counter nonsteroidal anti-inflammatory medicines, such as aspirin and ibuprofen, unless recommended by your healthcare provider for certain conditions.   · If possible, avoid nitrates (heart medicines, such as nitroglycerin and isosorbide dinitrate ).  Follow-up care  Make a follow-up appointment as directed by our staff.     When to call the healthcare provider  Call your healthcare provider immediately if you have any of the following:  · Trouble swallowing  · Pain when swallowing  · Feeling of food caught in your chest or throat  · Pain in the neck, chest, or back  · Heartburn that causes you to vomit  · Vomiting blood  · Black or  tarry stools (from digested blood)  · More saliva (watering of the mouth) than usual  · Weight loss of more than 3% to 5% of your total body weight in a month  · Hoarseness or sore throat that wont go away  · Choking, coughing, or wheezing   Date Last Reviewed: 7/1/2016  © 7823-4417 [employer]. [address] [place] [address], [place] [address]. All rights reserved. This information is not intended as a substitute for professional medical care. Always follow your healthcare professional's instructions.        Sty (or Stye)  A sty is an infection of the oil gland of the eyelid. It may develop into a small pocket of pus (abscess). This can cause pain, redness, and swelling. In early stages, styes are treated with antibiotic cream, eye drops, or warm packs (small towels soaked in warm water). More severe cases may need to be opened and drained by a health care provider.  Home care  · Eye drops or ointment are usually prescribed to treat the infection. Use these as directed.   ¨ Artificial tears may also be used to lubricate the eye and make it more comfortable. These may be purchased without a prescription.   ¨ Talk to your health care provider before using any over-the-counter treatment for a sty.  · Apply a warm, damp towel to the affected eye for at least 5 minutes, 3 to 4 times a day for a week. Warm compresses open the pores and speed the healing. If the compresses are too hot, they may burn your eyelid.  · Sometimes the sty will drain with this treatment alone. If this happens, continue the antibiotic until all the redness and swelling are gone.  · Wash your hands before and after touching the infected eye to avoid spreading the infection.  · Do not squeeze or try to puncture the sty.  Follow-up care  Follow up with your health care provider, or as advised.   When to seek medical advice  Call your health care provider right away if you have:  · Increase in swelling or redness around the eyelid after 48 to  72 hours  · Increase in eye pain or the eyelid blisters  · Increase in warmth--the eyelid feels hot  · Drainage of blood or thick pus from the sty  · Blister on the eyelid  · Inability to open the eyelid due to swelling  · Fever  ¨ 1 degree above your normal temperature lasting for 24 to 48 hours, or  ¨ Whatever your health care provider told you to report based on your medical condition  · Vision changes  · Headache or stiff neck  · Recurrence of the sty  Date Last Reviewed: 6/14/2015 © 2000-2017 [employer]. [address] [place] [address]. All rights reserved. This information is not intended as a substitute for professional medical care. Always follow your healthcare professional's instructions.        Sciatica    Sciatica is a condition that causes pain in the lower back that spreads down into the buttock, hip, and leg. Sometimes the leg pain can happen without any back pain. Sciatica happens when a spinal nerve is irritated or has pressure put on it as comes out of the spinal canal in the lower back. This most often happens when a bulge or rupture of a nearby spinal disk presses on the nerve. Sciatica can also be caused by a narrowing of the spinal canal (spinal stenosis) or spasm of the muscle in the buttocks that the sciatic nerve passes through (pyriform muscle). Sciatica is also called lumbar radiculopathy.  Sciatica may begin after a sudden twisting or bending force, such as in a car accident. Or it can happen after a simple awkward movement. In either case, muscle spasm often also happens. Muscle spasm makes the pain worse.  A healthcare provider makes a diagnosis of sciatica from your symptoms and a physical exam. Unless you had an injury from a car accident or fall, you usually wont have X-rays taken at this time. This is because the nerves and disks in your back cant be seen on an X-ray. If the provider sees signs of a compressed nerve, you will need to schedule an MRI scan  as an outpatient. Signs of a compressed nerve include loss of strength in a leg.  Most sciatica gets better with medicine, exercise, and physical therapy. If your symptoms continue after at least 3 months of medical treatment, you may need surgery or injections to your lower back.  Home care  Follow these tips when caring for yourself at home:  · You may need to stay in bed the first few days. But as soon as possible, begin sitting up or walking. This will help you avoid problems that come from staying in bed for long periods.  · When in bed, try to find a position that is comfortable. A firm mattress is best. Try lying flat on your back with pillows under your knees. You can also try lying on your side with your knees bent up toward your chest and a pillow between your knees.  · Avoid sitting for long periods. This puts more stress on your lower back than standing or walking.  · Use heat from a hot shower, hot bath, or heating pad to help ease pain. Massage can also help. You can also try using an ice pack. You can make your own ice pack by putting ice cubes in a plastic bag. Wrap the bag in a thin towel. Try both heat and cold to see which works best. Use the method that feels best for 20 minutes several times a day.  · You may use acetaminophen or ibuprofen to ease pain, unless another pain medicine was prescribed. Note: If you have chronic liver or kidney disease, talk with your healthcare provider before taking these medicines. Also talk with your provider if youve had a stomach ulcer or gastrointestinal bleeding.  · Use safe lifting methods. Dont lift anything heavier than 15 pounds until all of the pain is gone.  Follow-up care  Follow up with your healthcare provider, or as advised. You may need physical therapy or additional tests.  If X-rays were taken, a radiologist will look at them. You will be told of any new findings that may affect your care.  When to seek medical advice  Call your healthcare  provider right away if any of these occur:  · Pain gets worse even after taking prescribed medicine  · Weakness or numbness in 1 or both legs or hips  · Numbness in your groin or genital area  · You cant control your bowel or bladder  · Fever  · Redness or swelling over your back or spine   Date Last Reviewed: 8/1/2016  © 2414-4016 [employer]. [address] [place] [address]. All rights reserved. This information is not intended as a substitute for professional medical care. Always follow your healthcare professional's instructions.      Please follow up with your Primary care provider within 2-5 days if your signs and symptoms have not resolved or worsen.     If your condition worsens or fails to improve we recommend that you receive another evaluation at the emergency room immediately or contact your primary medical clinic to discuss your concerns.   You must understand that you have received an Urgent Care treatment only and that you may be released before all of your medical problems are known or treated. You, the patient, will arrange for follow up care as instructed.     RED FLAGS/WARNING SYMPTOMS DISCUSSED WITH PATIENT THAT WOULD WARRANT EMERGENT MEDICAL ATTENTION. PATIENT VERBALIZED UNDERSTANDING.

## 2020-03-06 ENCOUNTER — HOSPITAL ENCOUNTER (OUTPATIENT)
Facility: HOSPITAL | Age: 55
Discharge: HOME | End: 2020-03-06
Attending: EMERGENCY MEDICINE | Admitting: EMERGENCY MEDICINE
Payer: MEDICAID

## 2020-03-06 VITALS
WEIGHT: 220 LBS | HEART RATE: 63 BPM | TEMPERATURE: 98 F | SYSTOLIC BLOOD PRESSURE: 126 MMHG | RESPIRATION RATE: 19 BRPM | DIASTOLIC BLOOD PRESSURE: 88 MMHG | HEIGHT: 71 IN | BODY MASS INDEX: 30.8 KG/M2 | OXYGEN SATURATION: 97 %

## 2020-03-06 DIAGNOSIS — R07.9 CHEST PAIN: Primary | ICD-10-CM

## 2020-03-06 DIAGNOSIS — F10.10 ALCOHOL ABUSE: ICD-10-CM

## 2020-03-06 DIAGNOSIS — Z72.0 TOBACCO ABUSE: ICD-10-CM

## 2020-03-06 LAB
ALBUMIN SERPL BCP-MCNC: 3.8 G/DL (ref 3.5–5.2)
ALP SERPL-CCNC: 82 U/L (ref 55–135)
ALT SERPL W/O P-5'-P-CCNC: 19 U/L (ref 10–44)
ANION GAP SERPL CALC-SCNC: 8 MMOL/L (ref 8–16)
AORTIC ROOT ANNULUS: 3.12 CM
AORTIC VALVE CUSP SEPERATION: 2.23 CM
AST SERPL-CCNC: 22 U/L (ref 10–40)
AV INDEX (PROSTH): 0.51
AV MEAN GRADIENT: 3 MMHG
AV PEAK GRADIENT: 8 MMHG
AV VALVE AREA: 2.41 CM2
AV VELOCITY RATIO: 0.43
BASOPHILS # BLD AUTO: 0.06 K/UL (ref 0–0.2)
BASOPHILS NFR BLD: 0.9 % (ref 0–1.9)
BILIRUB SERPL-MCNC: 0.4 MG/DL (ref 0.1–1)
BNP SERPL-MCNC: <10 PG/ML (ref 0–99)
BSA FOR ECHO PROCEDURE: 2.24 M2
BUN SERPL-MCNC: 12 MG/DL (ref 6–20)
CALCIUM SERPL-MCNC: 9.7 MG/DL (ref 8.7–10.5)
CHLORIDE SERPL-SCNC: 102 MMOL/L (ref 95–110)
CHOLEST SERPL-MCNC: 247 MG/DL (ref 120–199)
CHOLEST/HDLC SERPL: 4.4 {RATIO} (ref 2–5)
CO2 SERPL-SCNC: 27 MMOL/L (ref 23–29)
CREAT SERPL-MCNC: 1.2 MG/DL (ref 0.5–1.4)
CV ECHO LV RWT: 0.39 CM
DIFFERENTIAL METHOD: ABNORMAL
DOP CALC AO PEAK VEL: 1.43 M/S
DOP CALC AO VTI: 21.42 CM
DOP CALC LVOT AREA: 4.7 CM2
DOP CALC LVOT DIAMETER: 2.45 CM
DOP CALC LVOT PEAK VEL: 0.62 M/S
DOP CALC LVOT STROKE VOLUME: 51.6 CM3
DOP CALCLVOT PEAK VEL VTI: 10.95 CM
E WAVE DECELERATION TIME: 207.67 MSEC
E/A RATIO: 1.2
ECHO LV POSTERIOR WALL: 0.96 CM (ref 0.6–1.1)
EOSINOPHIL # BLD AUTO: 0.9 K/UL (ref 0–0.5)
EOSINOPHIL NFR BLD: 13.6 % (ref 0–8)
ERYTHROCYTE [DISTWIDTH] IN BLOOD BY AUTOMATED COUNT: 13.6 % (ref 11.5–14.5)
EST. GFR  (AFRICAN AMERICAN): >60 ML/MIN/1.73 M^2
EST. GFR  (NON AFRICAN AMERICAN): >60 ML/MIN/1.73 M^2
FRACTIONAL SHORTENING: 31 % (ref 28–44)
GLUCOSE SERPL-MCNC: 121 MG/DL (ref 70–110)
HCT VFR BLD AUTO: 49.2 % (ref 40–54)
HDLC SERPL-MCNC: 56 MG/DL (ref 40–75)
HDLC SERPL: 22.7 % (ref 20–50)
HGB BLD-MCNC: 15.8 G/DL (ref 14–18)
IMM GRANULOCYTES # BLD AUTO: 0.01 K/UL (ref 0–0.04)
IMM GRANULOCYTES NFR BLD AUTO: 0.2 % (ref 0–0.5)
INTERVENTRICULAR SEPTUM: 1.12 CM (ref 0.6–1.1)
IVRT: 85.35 MSEC
LA MAJOR: 4.31 CM
LA MINOR: 4.52 CM
LA WIDTH: 4.44 CM
LDLC SERPL CALC-MCNC: 163.6 MG/DL (ref 63–159)
LEFT ATRIUM SIZE: 3.08 CM
LEFT ATRIUM VOLUME INDEX: 23.4 ML/M2
LEFT ATRIUM VOLUME: 51.29 CM3
LEFT INTERNAL DIMENSION IN SYSTOLE: 3.39 CM (ref 2.1–4)
LEFT VENTRICLE DIASTOLIC VOLUME INDEX: 51.1 ML/M2
LEFT VENTRICLE DIASTOLIC VOLUME: 112.21 ML
LEFT VENTRICLE MASS INDEX: 84 G/M2
LEFT VENTRICLE SYSTOLIC VOLUME INDEX: 21.5 ML/M2
LEFT VENTRICLE SYSTOLIC VOLUME: 47.23 ML
LEFT VENTRICULAR INTERNAL DIMENSION IN DIASTOLE: 4.89 CM (ref 3.5–6)
LEFT VENTRICULAR MASS: 185.04 G
LYMPHOCYTES # BLD AUTO: 1.3 K/UL (ref 1–4.8)
LYMPHOCYTES NFR BLD: 19.5 % (ref 18–48)
MCH RBC QN AUTO: 29.5 PG (ref 27–31)
MCHC RBC AUTO-ENTMCNC: 32.1 G/DL (ref 32–36)
MCV RBC AUTO: 92 FL (ref 82–98)
MONOCYTES # BLD AUTO: 0.6 K/UL (ref 0.3–1)
MONOCYTES NFR BLD: 9.5 % (ref 4–15)
MV PEAK A VEL: 0.56 M/S
MV PEAK E VEL: 0.67 M/S
NEUTROPHILS # BLD AUTO: 3.7 K/UL (ref 1.8–7.7)
NEUTROPHILS NFR BLD: 56.3 % (ref 38–73)
NONHDLC SERPL-MCNC: 191 MG/DL
NRBC BLD-RTO: 0 /100 WBC
PISA TR MAX VEL: 2.48 M/S
PLATELET # BLD AUTO: 250 K/UL (ref 150–350)
PMV BLD AUTO: 10.5 FL (ref 9.2–12.9)
POTASSIUM SERPL-SCNC: 4 MMOL/L (ref 3.5–5.1)
PROT SERPL-MCNC: 7.4 G/DL (ref 6–8.4)
PULM VEIN S/D RATIO: 0.83
PV PEAK D VEL: 0.29 M/S
PV PEAK S VEL: 0.24 M/S
PV PEAK VELOCITY: 0.9 CM/S
RA MAJOR: 4.25 CM
RA PRESSURE: 3 MMHG
RA WIDTH: 4.21 CM
RBC # BLD AUTO: 5.36 M/UL (ref 4.6–6.2)
RIGHT VENTRICULAR END-DIASTOLIC DIMENSION: 4.61 CM
SINUS: 3.27 CM
SODIUM SERPL-SCNC: 137 MMOL/L (ref 136–145)
STJ: 2.93 CM
TR MAX PG: 25 MMHG
TRICUSPID ANNULAR PLANE SYSTOLIC EXCURSION: 1.65 CM
TRIGL SERPL-MCNC: 137 MG/DL (ref 30–150)
TROPONIN I SERPL DL<=0.01 NG/ML-MCNC: 0.01 NG/ML (ref 0–0.03)
TROPONIN I SERPL DL<=0.01 NG/ML-MCNC: <0.006 NG/ML (ref 0–0.03)
TROPONIN I SERPL DL<=0.01 NG/ML-MCNC: <0.006 NG/ML (ref 0–0.03)
TSH SERPL DL<=0.005 MIU/L-ACNC: 1.07 UIU/ML (ref 0.4–4)
TV REST PULMONARY ARTERY PRESSURE: 28 MMHG
WBC # BLD AUTO: 6.63 K/UL (ref 3.9–12.7)

## 2020-03-06 PROCEDURE — 83036 HEMOGLOBIN GLYCOSYLATED A1C: CPT

## 2020-03-06 PROCEDURE — 80061 LIPID PANEL: CPT

## 2020-03-06 PROCEDURE — 36415 COLL VENOUS BLD VENIPUNCTURE: CPT

## 2020-03-06 PROCEDURE — 84484 ASSAY OF TROPONIN QUANT: CPT | Mod: 91

## 2020-03-06 PROCEDURE — 80053 COMPREHEN METABOLIC PANEL: CPT

## 2020-03-06 PROCEDURE — 93005 ELECTROCARDIOGRAM TRACING: CPT | Performed by: INTERNAL MEDICINE

## 2020-03-06 PROCEDURE — G0378 HOSPITAL OBSERVATION PER HR: HCPCS

## 2020-03-06 PROCEDURE — 84443 ASSAY THYROID STIM HORMONE: CPT

## 2020-03-06 PROCEDURE — 93005 ELECTROCARDIOGRAM TRACING: CPT

## 2020-03-06 PROCEDURE — 83880 ASSAY OF NATRIURETIC PEPTIDE: CPT

## 2020-03-06 PROCEDURE — 36000 PLACE NEEDLE IN VEIN: CPT

## 2020-03-06 PROCEDURE — 93010 ELECTROCARDIOGRAM REPORT: CPT | Mod: 76,,, | Performed by: INTERNAL MEDICINE

## 2020-03-06 PROCEDURE — 93010 ELECTROCARDIOGRAM REPORT: CPT | Mod: ,,, | Performed by: INTERNAL MEDICINE

## 2020-03-06 PROCEDURE — 93010 EKG 12-LEAD: ICD-10-PCS | Mod: 76,,, | Performed by: INTERNAL MEDICINE

## 2020-03-06 PROCEDURE — 99285 EMERGENCY DEPT VISIT HI MDM: CPT | Mod: 25

## 2020-03-06 PROCEDURE — 25000003 PHARM REV CODE 250: Performed by: EMERGENCY MEDICINE

## 2020-03-06 PROCEDURE — 85025 COMPLETE CBC W/AUTO DIFF WBC: CPT

## 2020-03-06 RX ORDER — SODIUM CHLORIDE 0.9 % (FLUSH) 0.9 %
10 SYRINGE (ML) INJECTION
Status: DISCONTINUED | OUTPATIENT
Start: 2020-03-06 | End: 2020-03-06 | Stop reason: HOSPADM

## 2020-03-06 RX ORDER — AMOXICILLIN 250 MG
1 CAPSULE ORAL DAILY PRN
Status: DISCONTINUED | OUTPATIENT
Start: 2020-03-06 | End: 2020-03-06 | Stop reason: HOSPADM

## 2020-03-06 RX ORDER — ACETAMINOPHEN 325 MG/1
650 TABLET ORAL EVERY 8 HOURS PRN
Status: DISCONTINUED | OUTPATIENT
Start: 2020-03-06 | End: 2020-03-06 | Stop reason: HOSPADM

## 2020-03-06 RX ORDER — FAMOTIDINE 20 MG/1
20 TABLET, FILM COATED ORAL 2 TIMES DAILY
Status: DISCONTINUED | OUTPATIENT
Start: 2020-03-06 | End: 2020-03-06 | Stop reason: HOSPADM

## 2020-03-06 RX ORDER — TALC
6 POWDER (GRAM) TOPICAL NIGHTLY PRN
Status: DISCONTINUED | OUTPATIENT
Start: 2020-03-06 | End: 2020-03-06 | Stop reason: HOSPADM

## 2020-03-06 RX ORDER — THIAMINE HCL 100 MG
100 TABLET ORAL DAILY
Status: DISCONTINUED | OUTPATIENT
Start: 2020-03-07 | End: 2020-03-06 | Stop reason: HOSPADM

## 2020-03-06 RX ORDER — ATORVASTATIN CALCIUM 10 MG/1
10 TABLET, FILM COATED ORAL NIGHTLY
Status: DISCONTINUED | OUTPATIENT
Start: 2020-03-06 | End: 2020-03-06 | Stop reason: HOSPADM

## 2020-03-06 RX ORDER — ASPIRIN 325 MG
325 TABLET ORAL
Status: COMPLETED | OUTPATIENT
Start: 2020-03-06 | End: 2020-03-06

## 2020-03-06 RX ORDER — FOLIC ACID 1 MG/1
1 TABLET ORAL DAILY
Status: DISCONTINUED | OUTPATIENT
Start: 2020-03-07 | End: 2020-03-06 | Stop reason: HOSPADM

## 2020-03-06 RX ORDER — ATORVASTATIN CALCIUM 10 MG/1
10 TABLET, FILM COATED ORAL NIGHTLY
Qty: 90 TABLET | Refills: 3 | Status: SHIPPED | OUTPATIENT
Start: 2020-03-06 | End: 2023-10-30

## 2020-03-06 RX ORDER — ONDANSETRON 2 MG/ML
4 INJECTION INTRAMUSCULAR; INTRAVENOUS EVERY 8 HOURS PRN
Status: DISCONTINUED | OUTPATIENT
Start: 2020-03-06 | End: 2020-03-06 | Stop reason: HOSPADM

## 2020-03-06 RX ORDER — NAPROXEN SODIUM 220 MG/1
81 TABLET, FILM COATED ORAL DAILY
Qty: 60 TABLET | Refills: 3 | Status: SHIPPED | OUTPATIENT
Start: 2020-03-07 | End: 2022-01-06 | Stop reason: SDUPTHER

## 2020-03-06 RX ORDER — NAPROXEN SODIUM 220 MG/1
81 TABLET, FILM COATED ORAL DAILY
Status: DISCONTINUED | OUTPATIENT
Start: 2020-03-07 | End: 2020-03-06 | Stop reason: HOSPADM

## 2020-03-06 RX ADMIN — ASPIRIN 325 MG ORAL TABLET 325 MG: 325 PILL ORAL at 09:03

## 2020-03-06 NOTE — ASSESSMENT & PLAN NOTE
Greater than 3 minutes spent counseling patient on dangers of continued tobacco abuse. Will provide tobacco cessation education. And PRN nicotine patch

## 2020-03-06 NOTE — HOSPITAL COURSE
Braeden Rowland 54 y.o. male placed in observation for chest pain. In the ED, troponin negative, chest x-ray without acute process, initial EKG NSR repeat t wave inversions in septal leads. Troponins remained negative. Echo without wall motion abnormality and diastolic dysfunction. He was eager for discharge. He was started on aspirin/statin. He was counseled on smoking cessation and alcohol cessation. He was instructed to follow up with [facility]. At discharge, patient was stable and chest pain free.

## 2020-03-06 NOTE — ED PROVIDER NOTES
"Encounter Date: 3/6/2020    SCRIBE #1 NOTE: I, Yvette Younger, am scribing for, and in the presence of,  Silvino Chu MD. I have scribed the following portions of the note - the EKG reading. Other sections scribed: HPI, ROS, PE.       History     Chief Complaint   Patient presents with    Chest Pain     sudden onset of CP this am while getting ready for work. pt denies SOB. denies PMHx. pain localized to left sided chest wall, radiating down L arm.      CC: Chest Pain    HPI:  Braeden Rowland is a 54 y.o. male smoker with a PMHx of GERD and HLD. He presents to the Emergency Department with a cc of chest pain which began approximately 2 hours PTA. The pain is said to radiate from the left shoulder to the chest; pain is described as tightness. Resolved now. The patient states that the complaint may be secondary to smoking a cigarette and drinking coffee before onset. He reports, "feeling funny" with associated dizziness and drowsiness; denies any emesis. There were no alleviating or worsening factors reported. Patient reports a prior history of similar symptoms several months ago but not as severe as the current episode. No FHx of Cardiovascular Disease.         The history is provided by the patient.     Review of patient's allergies indicates:  No Known Allergies  Past Medical History:   Diagnosis Date    Chest pain 10/2019    Cigarette smoker     GERD (gastroesophageal reflux disease)     Hyperlipidemia     used to take medication     Past Surgical History:   Procedure Laterality Date    NO PAST SURGERIES  03/06/2020     History reviewed. No pertinent family history.  Social History     Tobacco Use    Smoking status: Current Every Day Smoker     Packs/day: 0.50     Types: Cigarettes    Smokeless tobacco: Never Used   Substance Use Topics    Alcohol use: Yes     Alcohol/week: 21.0 standard drinks     Types: 21 Cans of beer per week     Comment: 3/6/20: "about 3, everyday after I get off work"    Drug use: " No     Review of Systems   Constitutional:        (+) Drowsiness    Cardiovascular: Positive for chest pain.   Gastrointestinal: Negative for vomiting.   Musculoskeletal: Positive for arthralgias.   Neurological: Positive for dizziness.   All other systems reviewed and are negative.      Physical Exam     Initial Vitals [03/06/20 0720]   BP Pulse Resp Temp SpO2   (!) 154/93 80 18 98.5 °F (36.9 °C) 99 %      MAP       --         Physical Exam    Nursing note and vitals reviewed.  Constitutional: He appears well-developed and well-nourished. He is not diaphoretic. No distress.   HENT:   Head: Normocephalic and atraumatic.   Eyes: Conjunctivae and EOM are normal. Pupils are equal, round, and reactive to light.   Neck: Normal range of motion. Neck supple.   Cardiovascular: Normal rate, regular rhythm, normal heart sounds and intact distal pulses. Exam reveals no gallop and no friction rub.    No murmur heard.  Pulmonary/Chest: Breath sounds normal. No respiratory distress.   Abdominal: Soft. Bowel sounds are normal.   Musculoskeletal: Normal range of motion.   Neurological: He is alert and oriented to person, place, and time. He has normal strength. GCS score is 15. GCS eye subscore is 4. GCS verbal subscore is 5. GCS motor subscore is 6.   Skin: Skin is warm and dry. Capillary refill takes less than 2 seconds.   Psychiatric: He has a normal mood and affect.         ED Course   Procedures  Labs Reviewed   CBC W/ AUTO DIFFERENTIAL - Abnormal; Notable for the following components:       Result Value    Eos # 0.9 (*)     Eosinophil% 13.6 (*)     All other components within normal limits   COMPREHENSIVE METABOLIC PANEL - Abnormal; Notable for the following components:    Glucose 121 (*)     All other components within normal limits   LIPID PANEL - Abnormal; Notable for the following components:    Cholesterol 247 (*)     LDL Cholesterol 163.6 (*)     All other components within normal limits   TROPONIN I   B-TYPE  NATRIURETIC PEPTIDE   TSH   TROPONIN I     EKG Readings: (Independently Interpreted)   Initial Reading: No STEMI. Rhythm: Normal Sinus Rhythm. Heart Rate: 78. Conduction: Normal.   Right Atrial Enlargement      ECG Results          EKG 12-lead (In process)  Result time 03/06/20 07:24:53    In process by Interface, Lab In [facility] (03/06/20 07:24:53)                 Narrative:    Test Reason : R07.9,    Vent. Rate : 078 BPM     Atrial Rate : 078 BPM     P-R Int : 148 ms          QRS Dur : 082 ms      QT Int : 362 ms       P-R-T Axes : 077 044 033 degrees     QTc Int : 412 ms    Normal sinus rhythm  Right atrial enlargement  Borderline Abnormal ECG  When compared with ECG of 18-SEP-2019 15:36,  Significant changes have occurred    Referred By:             Confirmed By:                   In process by Interface, Lab In [facility] (03/06/20 07:24:43)                 Narrative:    Test Reason : R07.9,    Vent. Rate : 078 BPM     Atrial Rate : 078 BPM     P-R Int : 148 ms          QRS Dur : 082 ms      QT Int : 362 ms       P-R-T Axes : 077 044 033 degrees     QTc Int : 412 ms    Normal sinus rhythm  Right atrial enlargement  Borderline Abnormal ECG  When compared with ECG of 18-SEP-2019 15:36,  Significant changes have occurred    Referred By:             Confirmed By:                             Imaging Results          X-Ray Chest 1 View (In process)  Result time 03/06/20 14:27:23                 Medical Decision Making:   History:   Old Medical Records: I decided to obtain old medical records.  Independently Interpreted Test(s):   I have ordered and independently interpreted EKG Reading(s) - see prior notes  Clinical Tests:   Lab Tests: Ordered and Reviewed  Radiological Study: Ordered and Reviewed  Medical Tests: Ordered and Reviewed  ED Management:  Mr Rowland has been stable during his time in the ER. No current complaints. Heart score 4. Warrants observation for further troponin trending and acs evaluation. Discussed w  [facility] with Hospital Medicine. ddx includes cardiac, pulmonary, GI pathology. Pt understands plan.     Additional MDM:   Heart Score:    History:          Moderately suspicious.  ECG:             Nonspecific repolarisation disturbance  Age:               45-65 years  Risk factors: 1-2 risk factors  Troponin:       Less than or equal to normal limit  Final Score: 4             Scribe Attestation:   Scribe #1: I performed the above scribed service and the documentation accurately describes the services I performed. I attest to the accuracy of the note.                          Clinical Impression:     1. Chest pain      Disposition:   Disposition: Placed in Observation  Condition: Stable     ED Disposition Condition    Observation              Scribe attestation: I, Silvino Chu MD, personally performed the services described in this documentation. All medical record entries made by the scribe were at my direction and in my presence.  I have reviewed the chart and agree that the record reflects my personal performance and is accurate and complete.               Silvino Chu MD  03/07/20 0753

## 2020-03-06 NOTE — SUBJECTIVE & OBJECTIVE
"Past Medical History:   Diagnosis Date    Chest pain 10/2019    Cigarette smoker     GERD (gastroesophageal reflux disease)     Hyperlipidemia     used to take medication       Past Surgical History:   Procedure Laterality Date    NO PAST SURGERIES  03/06/2020       Review of patient's allergies indicates:  No Known Allergies    No current facility-administered medications on file prior to encounter.      Current Outpatient Medications on File Prior to Encounter   Medication Sig    pantoprazole (PROTONIX) 20 MG tablet Take 1 tablet (20 mg total) by mouth once daily.    ranitidine (ZANTAC) 150 MG tablet Take 1 tablet (150 mg total) by mouth nightly.    [DISCONTINUED] clotrimazole (LOTRIMIN) 1 % cream Apply topically 2 (two) times daily.    [DISCONTINUED] erythromycin (ROMYCIN) ophthalmic ointment Place into the left eye 3 (three) times daily.    [DISCONTINUED] fluticasone (FLONASE) 50 mcg/actuation nasal spray 1 spray (50 mcg total) by Each Nare route 2 (two) times daily.    [DISCONTINUED] omeprazole (PRILOSEC) 40 MG capsule Take 40 mg by mouth once daily.     Family History     None        Tobacco Use    Smoking status: Current Every Day Smoker     Packs/day: 0.50     Types: Cigarettes    Smokeless tobacco: Never Used   Substance and Sexual Activity    Alcohol use: Yes     Alcohol/week: 21.0 standard drinks     Types: 21 Cans of beer per week     Comment: 3/6/20: "about 3, everyday after I get off work"    Drug use: No    Sexual activity: Not on file     Review of Systems   Constitutional: Negative for chills and fever.   HENT: Negative for nosebleeds and tinnitus.    Eyes: Negative for photophobia and visual disturbance.   Respiratory: Negative for shortness of breath and wheezing.    Cardiovascular: Positive for chest pain. Negative for palpitations and leg swelling.   Gastrointestinal: Positive for nausea. Negative for abdominal distention and vomiting.   Genitourinary: Negative for dysuria, flank " pain and hematuria.   Musculoskeletal: Negative for gait problem and joint swelling.   Skin: Negative for rash and wound.   Neurological: Negative for seizures and syncope.     Objective:     Vital Signs (Most Recent):  Temp: 98.1 °F (36.7 °C) (03/06/20 1456)  Pulse: 64 (03/06/20 1431)  Resp: 16 (03/06/20 1431)  BP: 117/62 (03/06/20 1431)  SpO2: 96 % (03/06/20 1431) Vital Signs (24h Range):  Temp:  [98.1 °F (36.7 °C)-98.7 °F (37.1 °C)] 98.1 °F (36.7 °C)  Pulse:  [64-80] 64  Resp:  [16-18] 16  SpO2:  [96 %-99 %] 96 %  BP: (117-163)/(62-93) 117/62     Weight: 99.8 kg (220 lb)  Body mass index is 30.68 kg/m².    Physical Exam   Constitutional: He is oriented to person, place, and time. He appears well-developed and well-nourished. No distress.   HENT:   Head: Normocephalic and atraumatic.   Eyes: Conjunctivae and EOM are normal. Right eye exhibits no discharge. Left eye exhibits no discharge.   Neck: Normal range of motion. No thyromegaly present.   Cardiovascular: Normal rate and regular rhythm.   No murmur heard.  Pulmonary/Chest: Effort normal and breath sounds normal. No respiratory distress. He exhibits no tenderness.   Abdominal: Soft. Bowel sounds are normal. He exhibits no distension and no mass. There is no tenderness.   Musculoskeletal: He exhibits no edema or deformity.   Neurological: He is alert and oriented to person, place, and time.   Skin: Skin is warm and dry.   Psychiatric: He has a normal mood and affect. His behavior is normal.   Nursing note and vitals reviewed.        CRANIAL NERVES     CN III, IV, VI   Extraocular motions are normal.        Significant Labs:   CBC:   Recent Labs   Lab 03/06/20  0912   WBC 6.63   HGB 15.8   HCT 49.2        CMP:   Recent Labs   Lab 03/06/20  0912      K 4.0      CO2 27   *   BUN 12   CREATININE 1.2   CALCIUM 9.7   PROT 7.4   ALBUMIN 3.8   BILITOT 0.4   ALKPHOS 82   AST 22   ALT 19   ANIONGAP 8   EGFRNONAA >60     Cardiac Markers:   Recent  Labs   Lab 03/06/20  0912   BNP <10     Lipid Panel:   Recent Labs   Lab 03/06/20  0913   CHOL 247*   HDL 56   LDLCALC 163.6*   TRIG 137   CHOLHDL 22.7     Troponin:   Recent Labs   Lab 03/06/20  0912 03/06/20  1226   TROPONINI 0.010 <0.006     TSH:   Recent Labs   Lab 03/06/20  0913   TSH 1.067       Significant Imaging:   Imaging Results          X-Ray Chest 1 View (Final result)  Result time 03/06/20 14:27:22    Final result by Calderon Montejo Jr., MD (03/06/20 14:27:22)                 Impression:      No significant cardiopulmonary abnormality.  No detrimental change.      Electronically signed by: Calderon Montejo MD  Date:    03/06/2020  Time:    14:27             Narrative:    EXAMINATION:  XR CHEST 1 VIEW    CLINICAL HISTORY:  chest pain;    TECHNIQUE:  Single frontal view of the chest was performed.    COMPARISON:  September 2019.    FINDINGS:  Monitoring leads in place.  Heart size pulmonary vessels are normal.  The lungs are well aerated and clear.  No pneumothorax.

## 2020-03-06 NOTE — ASSESSMENT & PLAN NOTE
Sudden onset left sided chest pain with radiation to left shoulder. Pain resolved after aspirin in the ED, troponin negative x2, chest x-ray without acute process.   -troponin trend  -telemetry  -continue aspirin. Will start statin  -echo pending  -A1c pending

## 2020-03-06 NOTE — ED TRIAGE NOTES
PT to ER with c/o right sided chest pain that started this morning while getting ready for work. Pt reports pain shooting down left arm with sweating and dizziness. PT denies cardiac history. Pt sitting in bed in no distress. VSS.

## 2020-03-06 NOTE — HPI
Braeden Rowland 54 y.o. male with GERD, cigarette use, and HLD presents to the hospital with a chief complaint of chest pain. He reports this morning at 6am he experienced sudden onset left sided chest pain with radiation to his left shoulder described as a tight pressure that resolved with aspirin in the ED. He is chest pain free now. He found the aspirin helped and found no aggravating factors for the pain. He denies fever, SOB, leg swelling, dizziness, syncope, vomiting, dysuria. He reports he hopes to be discharged today. He reports a history of previous HLD, but is no longer taking medication. He smokes cigarettes daily and drinks 3 beers a day. He denies any nervousness, anxiousness, or tremors. He does not experience exertional chest pain, he has had no hemoptysis, recent travel, history of blood clot, or periods of immobility.    In the ED, troponin negative, chest x-ray without acute process, initial EKG NSR repeat t wave inversions in septal leads.

## 2020-03-06 NOTE — H&P
[facility] Hospital Medicine  History & Physical    Patient Name: Braeden Rowland  MRN: [id number]  Admission Date: 3/6/2020  Attending Physician: David Bhardwaj MD   Primary Care Provider: Primary Doctor No         Patient information was obtained from patient, past medical records and ER records.     Subjective:     Principal Problem:Chest pain    Chief Complaint:   Chief Complaint   Patient presents with    Chest Pain     sudden onset of CP this am while getting ready for work. pt denies SOB. denies PMHx. pain localized to left sided chest wall, radiating down L arm.         HPI: Braeden Rowland 54 y.o. male with GERD, cigarette use, and HLD presents to the hospital with a chief complaint of chest pain. He reports this morning at 6am he experienced sudden onset left sided chest pain with radiation to his left shoulder described as a tight pressure that resolved with aspirin in the ED. He is chest pain free now. He found the aspirin helped and found no aggravating factors for the pain. He denies fever, SOB, leg swelling, dizziness, syncope, vomiting, dysuria. He reports he hopes to be discharged today. He reports a history of previous HLD, but is no longer taking medication. He smokes cigarettes daily and drinks 3 beers a day. He denies any nervousness, anxiousness, or tremors. He does not experience exertional chest pain, he has had no hemoptysis, recent travel, history of blood clot, or periods of immobility.    In the ED, troponin negative, chest x-ray without acute process, initial EKG NSR repeat t wave inversions in septal leads.     Past Medical History:   Diagnosis Date    Chest pain 10/2019    Cigarette smoker     GERD (gastroesophageal reflux disease)     Hyperlipidemia     used to take medication       Past Surgical History:   Procedure Laterality Date    NO PAST SURGERIES  03/06/2020       Review of patient's allergies indicates:  No Known Allergies    No current  "facility-administered medications on file prior to encounter.      Current Outpatient Medications on File Prior to Encounter   Medication Sig    pantoprazole (PROTONIX) 20 MG tablet Take 1 tablet (20 mg total) by mouth once daily.    ranitidine (ZANTAC) 150 MG tablet Take 1 tablet (150 mg total) by mouth nightly.    [DISCONTINUED] clotrimazole (LOTRIMIN) 1 % cream Apply topically 2 (two) times daily.    [DISCONTINUED] erythromycin (ROMYCIN) ophthalmic ointment Place into the left eye 3 (three) times daily.    [DISCONTINUED] fluticasone (FLONASE) 50 mcg/actuation nasal spray 1 spray (50 mcg total) by Each Nare route 2 (two) times daily.    [DISCONTINUED] omeprazole (PRILOSEC) 40 MG capsule Take 40 mg by mouth once daily.     Family History     None        Tobacco Use    Smoking status: Current Every Day Smoker     Packs/day: 0.50     Types: Cigarettes    Smokeless tobacco: Never Used   Substance and Sexual Activity    Alcohol use: Yes     Alcohol/week: 21.0 standard drinks     Types: 21 Cans of beer per week     Comment: 3/6/20: "about 3, everyday after I get off work"    Drug use: No    Sexual activity: Not on file     Review of Systems   Constitutional: Negative for chills and fever.   HENT: Negative for nosebleeds and tinnitus.    Eyes: Negative for photophobia and visual disturbance.   Respiratory: Negative for shortness of breath and wheezing.    Cardiovascular: Positive for chest pain. Negative for palpitations and leg swelling.   Gastrointestinal: Positive for nausea. Negative for abdominal distention and vomiting.   Genitourinary: Negative for dysuria, flank pain and hematuria.   Musculoskeletal: Negative for gait problem and joint swelling.   Skin: Negative for rash and wound.   Neurological: Negative for seizures and syncope.     Objective:     Vital Signs (Most Recent):  Temp: 98.1 °F (36.7 °C) (03/06/20 1456)  Pulse: 64 (03/06/20 1431)  Resp: 16 (03/06/20 1431)  BP: 117/62 (03/06/20 " 1431)  SpO2: 96 % (03/06/20 1431) Vital Signs (24h Range):  Temp:  [98.1 °F (36.7 °C)-98.7 °F (37.1 °C)] 98.1 °F (36.7 °C)  Pulse:  [64-80] 64  Resp:  [16-18] 16  SpO2:  [96 %-99 %] 96 %  BP: (117-163)/(62-93) 117/62     Weight: 99.8 kg (220 lb)  Body mass index is 30.68 kg/m².    Physical Exam   Constitutional: He is oriented to person, place, and time. He appears well-developed and well-nourished. No distress.   HENT:   Head: Normocephalic and atraumatic.   Eyes: Conjunctivae and EOM are normal. Right eye exhibits no discharge. Left eye exhibits no discharge.   Neck: Normal range of motion. No thyromegaly present.   Cardiovascular: Normal rate and regular rhythm.   No murmur heard.  Pulmonary/Chest: Effort normal and breath sounds normal. No respiratory distress. He exhibits no tenderness.   Abdominal: Soft. Bowel sounds are normal. He exhibits no distension and no mass. There is no tenderness.   Musculoskeletal: He exhibits no edema or deformity.   Neurological: He is alert and oriented to person, place, and time.   Skin: Skin is warm and dry.   Psychiatric: He has a normal mood and affect. His behavior is normal.   Nursing note and vitals reviewed.        CRANIAL NERVES     CN III, IV, VI   Extraocular motions are normal.        Significant Labs:   CBC:   Recent Labs   Lab 03/06/20  0912   WBC 6.63   HGB 15.8   HCT 49.2        CMP:   Recent Labs   Lab 03/06/20  0912      K 4.0      CO2 27   *   BUN 12   CREATININE 1.2   CALCIUM 9.7   PROT 7.4   ALBUMIN 3.8   BILITOT 0.4   ALKPHOS 82   AST 22   ALT 19   ANIONGAP 8   EGFRNONAA >60     Cardiac Markers:   Recent Labs   Lab 03/06/20  0912   BNP <10     Lipid Panel:   Recent Labs   Lab 03/06/20  0913   CHOL 247*   HDL 56   LDLCALC 163.6*   TRIG 137   CHOLHDL 22.7     Troponin:   Recent Labs   Lab 03/06/20  0912 03/06/20  1226   TROPONINI 0.010 <0.006     TSH:   Recent Labs   Lab 03/06/20  0913   TSH 1.067       Significant Imaging:   Imaging  Results          X-Ray Chest 1 View (Final result)  Result time 03/06/20 14:27:22    Final result by Calderon Montejo Jr., MD (03/06/20 14:27:22)                 Impression:      No significant cardiopulmonary abnormality.  No detrimental change.      Electronically signed by: Calderon Montejo MD  Date:    03/06/2020  Time:    14:27             Narrative:    EXAMINATION:  XR CHEST 1 VIEW    CLINICAL HISTORY:  chest pain;    TECHNIQUE:  Single frontal view of the chest was performed.    COMPARISON:  September 2019.    FINDINGS:  Monitoring leads in place.  Heart size pulmonary vessels are normal.  The lungs are well aerated and clear.  No pneumothorax.                                  Assessment/Plan:     * Chest pain  Sudden onset left sided chest pain with radiation to left shoulder. Pain resolved after aspirin in the ED, troponin negative x2, chest x-ray without acute process.   -troponin trend  -telemetry  -continue aspirin. Will start statin  -echo pending  -A1c pending      Alcohol abuse  Drinks 3 beers daily. No signs of withdrawal currently. Started on thiamine/folic acid supplementation. CIWA trend      Tobacco abuse  Greater than 3 minutes spent counseling patient on dangers of continued tobacco abuse. Will provide tobacco cessation education. And PRN nicotine patch        VTE Risk Mitigation (From admission, onward)         Ordered     IP VTE HIGH RISK PATIENT  Once      03/06/20 1624     Place BRANDON hose  Until discontinued      03/06/20 1624              VTE: BRANDON/SCD  Code: Full  Diet: cardiac  Dispo: pending echo  Patient will be placed in observation with hospital medicine.    Calderon Briceño PA-C  Department of Hospital Medicine   [facility]

## 2020-03-06 NOTE — ASSESSMENT & PLAN NOTE
Drinks 3 beers daily. No signs of withdrawal currently. Started on thiamine/folic acid supplementation. ARTEMIO trend

## 2020-03-06 NOTE — ED NOTES
Pt reporting slight pain in the left shoulder and chest. Report it is nothing like he experience before. MD made aware

## 2020-03-06 NOTE — DISCHARGE SUMMARY
[facility] Hospital Medicine  Discharge Summary      Patient Name: Braeden Rowland  MRN: [id number]  Admission Date: 3/6/2020  Hospital Length of Stay: 0 days  Discharge Date and Time:  03/06/2020 5:49 PM  Attending Physician: David Bhardwaj MD   Discharging Provider: Calderon Briceño PA-C  Primary Care Provider: Primary Doctor No      HPI:   Braeden Rowland 54 y.o. male with GERD, cigarette use, and HLD presents to the hospital with a chief complaint of chest pain. He reports this morning at 6am he experienced sudden onset left sided chest pain with radiation to his left shoulder described as a tight pressure that resolved with aspirin in the ED. He is chest pain free now. He found the aspirin helped and found no aggravating factors for the pain. He denies fever, SOB, leg swelling, dizziness, syncope, vomiting, dysuria. He reports he hopes to be discharged today. He reports a history of previous HLD, but is no longer taking medication. He smokes cigarettes daily and drinks 3 beers a day. He denies any nervousness, anxiousness, or tremors. He does not experience exertional chest pain, he has had no hemoptysis, recent travel, history of blood clot, or periods of immobility.    In the ED, troponin negative, chest x-ray without acute process, initial EKG NSR repeat t wave inversions in septal leads.     * No surgery found *      Hospital Course:   Braeden Rowland 54 y.o. male placed in observation for chest pain. In the ED, troponin negative, chest x-ray without acute process, initial EKG NSR repeat t wave inversions in septal leads. Troponins remained negative. Echo without wall motion abnormality and diastolic dysfunction. He was eager for discharge. He was started on aspirin/statin. He was counseled on smoking cessation and alcohol cessation. He was instructed to follow up with [place] and Cardiology. At discharge, patient was stable and chest pain free.      Consults:     No new Assessment &  Plan notes have been filed under this hospital service since the last note was generated.  Service: Hospital Medicine    Final Active Diagnoses:    Diagnosis Date Noted POA    PRINCIPAL PROBLEM:  Chest pain [R07.9] 03/06/2020 Yes    Tobacco abuse [Z72.0] 03/06/2020 Unknown    Alcohol abuse [F10.10] 03/06/2020 Unknown      Problems Resolved During this Admission:       Discharged Condition: stable    Disposition: Home or Self Care    Follow Up:  Follow-up Information     Brett Phillips MD. Call in 1 day.    Specialties:  Cardiology, INTERVENTIONAL CARDIOLOGY  Contact information:  [address]  [address]  [place] [address]  [phone]             Primary Doctor [facility]ah [facility]. Call in 1 day.    Contact information:  [address] [place] [address]  [phone]                 Patient Instructions:      Diet Cardiac     Notify your health care provider if you experience any of the following:  temperature >100.4     Notify your health care provider if you experience any of the following:  persistent nausea and vomiting or diarrhea     Notify your health care provider if you experience any of the following:  increased confusion or weakness     Notify your health care provider if you experience any of the following:  persistent dizziness, light-headedness, or visual disturbances     Activity as tolerated       Significant Diagnostic Studies: Labs:   CMP   Recent Labs   Lab 03/06/20  0912      K 4.0      CO2 27   *   BUN 12   CREATININE 1.2   CALCIUM 9.7   PROT 7.4   ALBUMIN 3.8   BILITOT 0.4   ALKPHOS 82   AST 22   ALT 19   ANIONGAP 8   ESTGFRAFRICA >60   EGFRNONAA >60   , CBC   Recent Labs   Lab 03/06/20  0912   WBC 6.63   HGB 15.8   HCT 49.2      , Lipid Panel   Lab Results   Component Value Date    CHOL 247 (H) 03/06/2020    HDL 56 03/06/2020    LDLCALC 163.6 (H) 03/06/2020    TRIG 137 03/06/2020    CHOLHDL 22.7 03/06/2020    and Troponin   Recent Labs    Lab 03/06/20  1226   TROPONINI <0.006       Pending Diagnostic Studies:     Procedure Component Value Units Date/Time    Hemoglobin A1c [[id number]] Collected:  03/06/20 1706    Order Status:  Sent Lab Status:  In process Updated:  03/06/20 1706    Specimen:  Blood     Troponin I [[id number]] Collected:  03/06/20 1706    Order Status:  Sent Lab Status:  In process Updated:  03/06/20 1716    Specimen:  Blood     Troponin I [[id number]]     Order Status:  Sent Lab Status:  No result     Specimen:  Blood          Medications:  Reconciled Home Medications:      Medication List      START taking these medications    aspirin 81 MG Chew  Take 1 tablet (81 mg total) by mouth once daily.  Start taking on:  March 7, 2020     atorvastatin 10 MG tablet  Commonly known as:  LIPITOR  Take 1 tablet (10 mg total) by mouth every evening.        CONTINUE taking these medications    pantoprazole 20 MG tablet  Commonly known as:  PROTONIX  Take 1 tablet (20 mg total) by mouth once daily.     ranitidine 150 MG tablet  Commonly known as:  ZANTAC  Take 1 tablet (150 mg total) by mouth nightly.            Indwelling Lines/Drains at time of discharge:   Lines/Drains/Airways     None                 Time spent on the discharge of patient: 39 minutes  Patient was seen and examined on the date of discharge and determined to be suitable for discharge.         Calderon Briceño PA-C  Department of Hospital Medicine  [facility]

## 2020-03-07 LAB
ESTIMATED AVG GLUCOSE: 117 MG/DL (ref 68–131)
HBA1C MFR BLD HPLC: 5.7 % (ref 4–5.6)

## 2020-05-13 ENCOUNTER — HOSPITAL ENCOUNTER (EMERGENCY)
Facility: HOSPITAL | Age: 55
Discharge: HOME | End: 2020-05-13
Attending: EMERGENCY MEDICINE
Payer: MEDICAID

## 2020-05-13 VITALS
BODY MASS INDEX: 28 KG/M2 | DIASTOLIC BLOOD PRESSURE: 74 MMHG | OXYGEN SATURATION: 97 % | HEART RATE: 54 BPM | RESPIRATION RATE: 16 BRPM | WEIGHT: 200 LBS | TEMPERATURE: 98 F | HEIGHT: 71 IN | SYSTOLIC BLOOD PRESSURE: 118 MMHG

## 2020-05-13 DIAGNOSIS — R07.9 CHEST PAIN: ICD-10-CM

## 2020-05-13 DIAGNOSIS — R00.2 PALPITATION: Primary | ICD-10-CM

## 2020-05-13 DIAGNOSIS — R79.89 ELEVATED SERUM CREATININE: ICD-10-CM

## 2020-05-13 LAB
ALBUMIN SERPL-MCNC: 3.8 G/DL (ref 3.3–5.5)
ALP SERPL-CCNC: 65 U/L (ref 42–141)
BILIRUB SERPL-MCNC: 0.6 MG/DL (ref 0.2–1.6)
BILIRUBIN, POC UA: NEGATIVE
BLOOD, POC UA: ABNORMAL
BUN SERPL-MCNC: 11 MG/DL (ref 7–22)
CALCIUM SERPL-MCNC: 9.5 MG/DL (ref 8–10.3)
CHLORIDE SERPL-SCNC: 104 MMOL/L (ref 98–108)
CLARITY, POC UA: CLEAR
COLOR, POC UA: YELLOW
CREAT SERPL-MCNC: 1.3 MG/DL (ref 0.6–1.2)
GLUCOSE SERPL-MCNC: 109 MG/DL (ref 73–118)
GLUCOSE, POC UA: NEGATIVE
KETONES, POC UA: NEGATIVE
LEUKOCYTE EST, POC UA: NEGATIVE
NITRITE, POC UA: NEGATIVE
PH UR STRIP: 5.5 [PH]
POC ALT (SGPT): 27 U/L (ref 10–47)
POC AST (SGOT): 30 U/L (ref 11–38)
POC CARDIAC TROPONIN I: 0 NG/ML
POC PTINR: 0.9 (ref 0.9–1.2)
POC PTWBT: 11.1 SEC (ref 9.7–14.3)
POC TCO2: 27 MMOL/L (ref 18–33)
POTASSIUM BLD-SCNC: 4.4 MMOL/L (ref 3.6–5.1)
PROTEIN, POC UA: NEGATIVE
PROTEIN, POC: 7.5 G/DL (ref 6.4–8.1)
SAMPLE: NORMAL
SAMPLE: NORMAL
SARS-COV-2 RDRP RESP QL NAA+PROBE: NEGATIVE
SODIUM BLD-SCNC: 143 MMOL/L (ref 128–145)
SPECIFIC GRAVITY, POC UA: <=1.005
TSH SERPL DL<=0.005 MIU/L-ACNC: 1.55 UIU/ML (ref 0.4–4)
UROBILINOGEN, POC UA: 0.2 E.U./DL

## 2020-05-13 PROCEDURE — 84484 ASSAY OF TROPONIN QUANT: CPT | Mod: ER

## 2020-05-13 PROCEDURE — 81003 URINALYSIS AUTO W/O SCOPE: CPT | Mod: ER

## 2020-05-13 PROCEDURE — 93005 ELECTROCARDIOGRAM TRACING: CPT | Mod: ER

## 2020-05-13 PROCEDURE — 80053 COMPREHEN METABOLIC PANEL: CPT | Mod: ER

## 2020-05-13 PROCEDURE — 93010 ELECTROCARDIOGRAM REPORT: CPT | Mod: ,,, | Performed by: INTERNAL MEDICINE

## 2020-05-13 PROCEDURE — 96360 HYDRATION IV INFUSION INIT: CPT | Mod: ER

## 2020-05-13 PROCEDURE — 25000003 PHARM REV CODE 250: Mod: ER | Performed by: EMERGENCY MEDICINE

## 2020-05-13 PROCEDURE — 85610 PROTHROMBIN TIME: CPT | Mod: ER

## 2020-05-13 PROCEDURE — 84443 ASSAY THYROID STIM HORMONE: CPT

## 2020-05-13 PROCEDURE — U0002 COVID-19 LAB TEST NON-CDC: HCPCS

## 2020-05-13 PROCEDURE — 93010 EKG 12-LEAD: ICD-10-PCS | Mod: ,,, | Performed by: INTERNAL MEDICINE

## 2020-05-13 PROCEDURE — 99284 EMERGENCY DEPT VISIT MOD MDM: CPT | Mod: 25,ER

## 2020-05-13 PROCEDURE — 85025 COMPLETE CBC W/AUTO DIFF WBC: CPT | Mod: ER

## 2020-05-13 RX ORDER — NITROGLYCERIN 0.4 MG/1
0.4 TABLET SUBLINGUAL EVERY 5 MIN PRN
Status: DISCONTINUED | OUTPATIENT
Start: 2020-05-13 | End: 2020-05-13 | Stop reason: HOSPADM

## 2020-05-13 RX ORDER — ASPIRIN 325 MG
325 TABLET ORAL
Status: COMPLETED | OUTPATIENT
Start: 2020-05-13 | End: 2020-05-13

## 2020-05-13 RX ADMIN — SODIUM CHLORIDE 1000 ML: 0.9 INJECTION, SOLUTION INTRAVENOUS at 08:05

## 2020-05-13 RX ADMIN — ASPIRIN 325 MG ORAL TABLET 325 MG: 325 PILL ORAL at 07:05

## 2020-05-13 NOTE — DISCHARGE INSTRUCTIONS
In March of 2020 your kidney function measurements of creatinine was 1.2.  Today your creatinine is 1.3.  Please establish a primary care physician for further management and evaluation of her kidney function as well as elevated blood pressure.

## 2020-05-13 NOTE — ED TRIAGE NOTES
Pt reports he started feeling like his heart was beating fast after waking up this am.  Reports some dizziness.  Denies CP, SOB, n/v, or feeling like he was going to pass out.

## 2020-05-13 NOTE — ED PROVIDER NOTES
"Encounter Date: 5/13/2020       History     Chief Complaint   Patient presents with    Chest Pain     Pt to ER with c/o chest pain and "heart beating funny" since this morning. No N/V, dizziness, sweating. no SOB weakness. Pt denies heart history. Pt denies pain or discomfort at this time      54-year-old gentleman chief complaint heart beating fast.  Patient states he woke up at 5:00 a.m. today and felt that his heart was racing.  Patient states the symptoms lasted for about 30 min and then went away.  Patient states [facility] has had [facility] but never has.  Patient states today he got up 5:00 a.m. normal time went to the bathroom felt his heart was racing usually drinks coffee every morning he did not have any coffee today.  Patient states he came directly to the emergency room to get checked out.  Patient states when his heart was beating fast had no chest pain no shortness of breath no headaches no bat abdominal pain no nausea no vomiting no diarrhea.        Review of patient's allergies indicates:  No Known Allergies  Past Medical History:   Diagnosis Date    Chest pain 10/2019    Cigarette smoker     GERD (gastroesophageal reflux disease)     Hyperlipidemia     used to take medication     Past Surgical History:   Procedure Laterality Date    NO PAST SURGERIES  03/06/2020     No family history on file.  Social History     Tobacco Use    Smoking status: Current Every Day Smoker     Packs/day: 0.50     Types: Cigarettes    Smokeless tobacco: Never Used   Substance Use Topics    Alcohol use: Yes     Alcohol/week: 21.0 standard drinks     Types: 21 Cans of beer per week     Comment: 3/6/20: "about 3, everyday after I get off work"    Drug use: No     Review of Systems   Constitutional: Negative for fever.   HENT: Negative for sore throat.    Respiratory: Negative for shortness of breath.    Cardiovascular: Positive for palpitations. Negative for " chest pain.   Gastrointestinal: Negative for nausea.   Genitourinary: Negative for dysuria.   Musculoskeletal: Negative for back pain.   Skin: Negative for rash.   Neurological: Negative for weakness.   Hematological: Does not bruise/bleed easily.   All other systems reviewed and are negative.      Physical Exam     Initial Vitals [05/13/20 0703]   BP Pulse Resp Temp SpO2   138/85 68 18 98.3 °F (36.8 °C) 98 %      MAP       --         Physical Exam    Nursing note and vitals reviewed.  Constitutional: He appears well-developed and well-nourished. He is not diaphoretic. No distress.   HENT:   Head: Normocephalic and atraumatic.   Right Ear: External ear normal.   Left Ear: External ear normal.   Nose: Nose normal.   Mouth/Throat: Oropharynx is clear and moist.   Eyes: EOM are normal. Pupils are equal, round, and reactive to light. Right eye exhibits no discharge. Left eye exhibits no discharge.   Neck: Normal range of motion. Neck supple.   Cardiovascular: Normal rate, regular rhythm, normal heart sounds and intact distal pulses. Exam reveals no gallop and no friction rub.    No murmur heard.  Pulmonary/Chest: Breath sounds normal. No respiratory distress. He has no wheezes. He has no rhonchi. He has no rales. He exhibits no tenderness.   Abdominal: Soft. Bowel sounds are normal. He exhibits no distension. There is no tenderness. There is no rebound and no guarding.   Musculoskeletal: Normal range of motion. He exhibits no edema or tenderness.   Neurological: He is alert and oriented to person, place, and time. No cranial nerve deficit or sensory deficit.   Skin: Skin is warm. No rash noted. No pallor.   Psychiatric: He has a normal mood and affect.         ED Course   Procedures  Labs Reviewed   POCT URINALYSIS W/O SCOPE - Abnormal; Notable for the following components:       Result Value    Spec Grav UA <=1.005 (*)     Blood, UA Trace-intact (*)     All other components within normal limits   POCT CMP - Abnormal;  Notable for the following components:    POC Creatinine 1.3 (*)     All other components within normal limits   TROPONIN ISTAT   TSH   SARS-COV-2 RNA AMPLIFICATION, QUAL   POCT CBC   POCT URINALYSIS W/O SCOPE   POCT CMP   POCT PROTIME-INR   POCT TROPONIN   ISTAT PROCEDURE         EKG Readings: (Independently Interpreted)   Initial Reading: No STEMI. Previous EKG: Compared with most recent EKG Previous EKG Date: When compared to prior EKG done 03/06/2020 rate has decreased by 3 beats per minute. Rhythm: Normal Sinus Rhythm. Heart Rate: Sixty-eight. Axis: Normal. Other Impression: Normal EKG, QTC normal at 421.       Imaging Results          X-Ray Chest PA And Lateral (Final result)  Result time 05/13/20 08:00:41    Final result by Glenn Nava MD (05/13/20 08:00:41)                 Impression:      No acute abnormality.      Electronically signed by: Glenn Nava MD  Date:    05/13/2020  Time:    08:00             Narrative:    EXAMINATION:  XR CHEST PA AND LATERAL    CLINICAL HISTORY:  Chest Pain;    TECHNIQUE:  PA and lateral views of the chest were performed.    COMPARISON:  03/06/2020    FINDINGS:  The lungs are clear, with normal appearance of pulmonary vasculature and no pleural effusion or pneumothorax.    The cardiac silhouette is normal in size. The hilar and mediastinal contours are unremarkable.    Bones are intact.                                    Additional MDM:   Heart Score:    History:          Slightly suspicious.  ECG:             Normal  Age:               45-65 years  Risk factors: 1-2 risk factors  Troponin:       Less than or equal to normal limit  Final Score: 2                       Medical decision making   Chief complaint:  Heart beating fast  Differential diagnosis:  Cardiac arrhythmia, SVT, atrial fibrillation, STEMI, and anxiety  Treatment in the ED Physical Exam, aspirin.  Patient reports spontaneous resolution of symptoms prior to arrival in the ER.    Discussed labs, and  imaging results.    Fill and take prescriptions as directed.  Return to the ED if symptoms worsen or do not resolve.   Answered questions and discussed discharge plan.    Patient feels better and is ready for discharge.  Follow up with PCP/specialist in 1 day.             Clinical Impression:       ICD-10-CM ICD-9-CM   1. Palpitation R00.2 785.1   2. Chest pain R07.9 786.50   3. Elevated serum creatinine R79.89 790.99                                Lacy Fairchild DO  05/13/20 0831

## 2020-06-07 ENCOUNTER — HOSPITAL ENCOUNTER (EMERGENCY)
Facility: HOSPITAL | Age: 55
Discharge: HOME | End: 2020-06-07
Attending: EMERGENCY MEDICINE
Payer: MEDICAID

## 2020-06-07 VITALS
WEIGHT: 215 LBS | HEART RATE: 68 BPM | SYSTOLIC BLOOD PRESSURE: 166 MMHG | DIASTOLIC BLOOD PRESSURE: 89 MMHG | OXYGEN SATURATION: 97 % | RESPIRATION RATE: 18 BRPM | BODY MASS INDEX: 30.1 KG/M2 | HEIGHT: 71 IN | TEMPERATURE: 98 F

## 2020-06-07 DIAGNOSIS — M25.512 ACUTE PAIN OF LEFT SHOULDER: Primary | ICD-10-CM

## 2020-06-07 DIAGNOSIS — M25.512 LEFT SHOULDER PAIN: ICD-10-CM

## 2020-06-07 PROCEDURE — 93010 ELECTROCARDIOGRAM REPORT: CPT | Mod: ,,, | Performed by: INTERNAL MEDICINE

## 2020-06-07 PROCEDURE — 93010 EKG 12-LEAD: ICD-10-PCS | Mod: ,,, | Performed by: INTERNAL MEDICINE

## 2020-06-07 PROCEDURE — 93005 ELECTROCARDIOGRAM TRACING: CPT | Mod: ER

## 2020-06-07 PROCEDURE — 99284 EMERGENCY DEPT VISIT MOD MDM: CPT | Mod: 25,ER

## 2020-06-07 PROCEDURE — 25000003 PHARM REV CODE 250: Mod: ER | Performed by: NURSE PRACTITIONER

## 2020-06-07 RX ORDER — DEXTROMETHORPHAN HYDROBROMIDE, GUAIFENESIN 5; 100 MG/5ML; MG/5ML
650 LIQUID ORAL EVERY 8 HOURS
Qty: 20 TABLET | Refills: 0 | OUTPATIENT
Start: 2020-06-07 | End: 2020-09-05

## 2020-06-07 RX ORDER — ACETAMINOPHEN 500 MG
1000 TABLET ORAL
Status: COMPLETED | OUTPATIENT
Start: 2020-06-07 | End: 2020-06-07

## 2020-06-07 RX ORDER — DICLOFENAC SODIUM 10 MG/G
2 GEL TOPICAL 4 TIMES DAILY
Qty: 100 G | Refills: 0 | Status: SHIPPED | OUTPATIENT
Start: 2020-06-07 | End: 2020-07-11 | Stop reason: ALTCHOICE

## 2020-06-07 RX ADMIN — ACETAMINOPHEN 1000 MG: 500 TABLET ORAL at 05:06

## 2020-06-07 NOTE — ED PROVIDER NOTES
"Encounter Date: 6/7/2020    SCRIBE #1 NOTE: I, Abi Sanchez , am scribing for, and in the presence of,  IDALMIS Oviedo . I have scribed the following portions of the note - Other sections scribed: HPI, ROS, PE .   SCRIBE #2 NOTE: I, Josefina Chadwick, am scribing for, and in the presence of,  Dr. Fairchild. I have scribed the following portions of the note - the EKG reading.     History     Chief Complaint   Patient presents with    Shoulder Pain     left shoulder pain "every now and then", pt states it is arthritis.      Braeden Rowland is a 54 y.o. male who presents to the ED complaining of left shoulder pain intermittently for years.  Patient denies trauma, injury, fever, fatigue, chest pain, shortness of breath, abdominal pain, nausea, vomiting, diarrhea, dysuria, hematuria, rash, numbness, weakness, tingling, or any additional complaints.  Patient rates his pain as 7/10 and has not tried any medications for his symptoms.        The history is provided by the patient.     Review of patient's allergies indicates:  No Known Allergies  Past Medical History:   Diagnosis Date    Chest pain 10/2019    Cigarette smoker     GERD (gastroesophageal reflux disease)     Hyperlipidemia     used to take medication     Past Surgical History:   Procedure Laterality Date    NO PAST SURGERIES  03/06/2020     History reviewed. No pertinent family history.  Social History     Tobacco Use    Smoking status: Current Every Day Smoker     Packs/day: 0.50     Types: Cigarettes    Smokeless tobacco: Never Used   Substance Use Topics    Alcohol use: Yes     Alcohol/week: 21.0 standard drinks     Types: 21 Cans of beer per week     Comment: 3/6/20: "about 3, everyday after I get off work"    Drug use: No     Review of Systems   Constitutional: Negative for chills and fever.   HENT: Negative for congestion, ear pain, rhinorrhea and sore throat.    Eyes: Negative for pain, discharge and redness.   Respiratory: Negative for cough and " shortness of breath.    Cardiovascular: Negative for chest pain.   Gastrointestinal: Negative for abdominal pain, diarrhea, nausea and vomiting.   Genitourinary: Negative for dysuria.   Musculoskeletal: Positive for arthralgias (left shoulder). Negative for back pain, neck pain and neck stiffness.   Skin: Negative for rash.   Neurological: Negative for dizziness, weakness, light-headedness, numbness and headaches.   Psychiatric/Behavioral: Negative for confusion.       Physical Exam     Initial Vitals [06/07/20 1738]   BP Pulse Resp Temp SpO2   (!) 166/89 68 18 97.9 °F (36.6 °C) 97 %      MAP       --         Physical Exam    Nursing note and vitals reviewed.  Constitutional: Vital signs are normal. He appears well-developed. He is cooperative.  Non-toxic appearance. He does not appear ill.   HENT:   Head: Normocephalic and atraumatic.   Eyes: Conjunctivae are normal.   Neck: Normal range of motion.   Cardiovascular: Normal rate and regular rhythm.   Pulmonary/Chest: Effort normal and breath sounds normal.   Abdominal: Normal appearance.   Musculoskeletal:        Left shoulder: He exhibits tenderness. He exhibits normal range of motion and no bony tenderness.   Tenderness to left shoulder with normal ROM, strength, and sensation, +2 radial pulse, capillary refill < 2 seconds; no bruising, rash, erythema, crepitus, or deformity   Neurological: He is alert and oriented to person, place, and time. GCS eye subscore is 4. GCS verbal subscore is 5. GCS motor subscore is 6.   Skin: Skin is warm, dry and intact. No rash noted.   Psychiatric: He has a normal mood and affect. His speech is normal and behavior is normal. Thought content normal.         ED Course   Procedures  Labs Reviewed - No data to display  EKG Readings: (Independently Interpreted)   Initial Reading: No STEMI. Rhythm: Normal Sinus Rhythm. Heart Rate: 68. Axis: Normal.   Abnormal EKG. QTc normal at 414. When compared to prior EKG dated 5/13/20 rate has  remained the same.        Imaging Results          X-Ray Shoulder 2 or More Views Left (Final result)  Result time 06/07/20 18:16:28    Final result by Gallo Marsh MD (06/07/20 18:16:28)                 Impression:      No acute osseous abnormality identified.      Electronically signed by: Gallo Marsh MD  Date:    06/07/2020  Time:    18:16             Narrative:    EXAMINATION:  XR SHOULDER COMPLETE 2 OR MORE VIEWS LEFT    CLINICAL HISTORY:  left shoulder pain;    TECHNIQUE:  Three views of the left shoulder were performed.    COMPARISON  None    FINDINGS:  No evidence of acute displaced fracture, dislocation, or osseous destructive process.                                       APC / Resident Notes:   This is an evaluation of a 54 y.o. male that presents to the Emergency Department for left shoulder pain    Physical Exam shows a non-toxic, afebrile, and well appearing male. Tenderness to left shoulder with normal ROM, strength, and sensation, +2 radial pulse, capillary refill < 2 seconds; no bruising, rash, erythema, crepitus, or deformity    Vital signs are reassuring. If available, previous records reviewed.   RESULTS: EKG showed no STEMI, Xray negative    My overall impression is Left shoulder pain. I considered, but at this time, do not suspect fracture, dislocation, gout, septic joint, STEMI, NSTEMI.    ED Course: Xray, EKG, Tylenol. D/C Meds: Tylenol, Voltaren gel. D/C Information: f/u, medications. The diagnosis, treatment plan, instructions for follow-up as well as ED return precautions were discussed and understanding was verbalized. All questions or concerns have been addressed.            Scribe Attestation:   Scribe #1: I performed the above scribed service and the documentation accurately describes the services I performed. I attest to the accuracy of the note.  Scribe #2: I performed the above scribed service and the documentation accurately describes the services I performed. I attest to  the accuracy of the note.    Physician Attestation for Scribe:  Physician Attestation Statement for Scribe: I, IDALMIS Oviedo, reviewed documentation, as scribed by Abi Sanchez in my presence, and it is both accurate and complete.                           Clinical Impression:     1. Acute pain of left shoulder    2. Left shoulder pain            Disposition:   Disposition: Discharged  Condition: Stable                        IDALMIS Tejeda  06/07/20 8742

## 2020-07-11 ENCOUNTER — HOSPITAL ENCOUNTER (EMERGENCY)
Facility: HOSPITAL | Age: 55
Discharge: HOME | End: 2020-07-11
Attending: EMERGENCY MEDICINE
Payer: MEDICAID

## 2020-07-11 VITALS
SYSTOLIC BLOOD PRESSURE: 139 MMHG | OXYGEN SATURATION: 98 % | DIASTOLIC BLOOD PRESSURE: 83 MMHG | BODY MASS INDEX: 30.1 KG/M2 | HEART RATE: 69 BPM | TEMPERATURE: 99 F | RESPIRATION RATE: 18 BRPM | WEIGHT: 215 LBS | HEIGHT: 71 IN

## 2020-07-11 DIAGNOSIS — R07.89 CHEST WALL PAIN: Primary | ICD-10-CM

## 2020-07-11 LAB
ALBUMIN SERPL-MCNC: 3.6 G/DL (ref 3.3–5.5)
ALP SERPL-CCNC: 54 U/L (ref 42–141)
BILIRUB SERPL-MCNC: 0.6 MG/DL (ref 0.2–1.6)
BUN SERPL-MCNC: 10 MG/DL (ref 7–22)
CALCIUM SERPL-MCNC: 8.9 MG/DL (ref 8–10.3)
CHLORIDE SERPL-SCNC: 104 MMOL/L (ref 98–108)
CREAT SERPL-MCNC: 0.8 MG/DL (ref 0.6–1.2)
GLUCOSE SERPL-MCNC: 109 MG/DL (ref 73–118)
POC ALT (SGPT): 30 U/L (ref 10–47)
POC AST (SGOT): 30 U/L (ref 11–38)
POC B-TYPE NATRIURETIC PEPTIDE: 11.4 PG/ML (ref 0–100)
POC CARDIAC TROPONIN I: 0 NG/ML
POC TCO2: 29 MMOL/L (ref 18–33)
POTASSIUM BLD-SCNC: 4 MMOL/L (ref 3.6–5.1)
PROTEIN, POC: 7.2 G/DL (ref 6.4–8.1)
SAMPLE: NORMAL
SODIUM BLD-SCNC: 143 MMOL/L (ref 128–145)

## 2020-07-11 PROCEDURE — 93010 ELECTROCARDIOGRAM REPORT: CPT | Mod: ,,, | Performed by: INTERNAL MEDICINE

## 2020-07-11 PROCEDURE — 80053 COMPREHEN METABOLIC PANEL: CPT | Mod: ER

## 2020-07-11 PROCEDURE — 84484 ASSAY OF TROPONIN QUANT: CPT | Mod: ER

## 2020-07-11 PROCEDURE — 96360 HYDRATION IV INFUSION INIT: CPT | Mod: ER

## 2020-07-11 PROCEDURE — 93010 EKG 12-LEAD: ICD-10-PCS | Mod: ,,, | Performed by: INTERNAL MEDICINE

## 2020-07-11 PROCEDURE — 83880 ASSAY OF NATRIURETIC PEPTIDE: CPT | Mod: ER

## 2020-07-11 PROCEDURE — 99285 EMERGENCY DEPT VISIT HI MDM: CPT | Mod: 25,ER

## 2020-07-11 PROCEDURE — 25000003 PHARM REV CODE 250: Mod: ER | Performed by: EMERGENCY MEDICINE

## 2020-07-11 PROCEDURE — 93005 ELECTROCARDIOGRAM TRACING: CPT | Mod: ER

## 2020-07-11 PROCEDURE — 85025 COMPLETE CBC W/AUTO DIFF WBC: CPT | Mod: ER

## 2020-07-11 RX ORDER — FAMOTIDINE 20 MG/1
20 TABLET, FILM COATED ORAL 2 TIMES DAILY
Qty: 60 TABLET | Refills: 0 | Status: SHIPPED | OUTPATIENT
Start: 2020-07-11 | End: 2020-10-25 | Stop reason: ALTCHOICE

## 2020-07-11 RX ORDER — MELOXICAM 15 MG/1
15 TABLET ORAL DAILY
Qty: 30 TABLET | Refills: 0 | Status: SHIPPED | OUTPATIENT
Start: 2020-07-11 | End: 2020-10-08 | Stop reason: ALTCHOICE

## 2020-07-11 RX ADMIN — SODIUM CHLORIDE 1000 ML: 0.9 INJECTION, SOLUTION INTRAVENOUS at 07:07

## 2020-07-11 RX ADMIN — LIDOCAINE HYDROCHLORIDE: 20 SOLUTION ORAL; TOPICAL at 07:07

## 2020-07-11 NOTE — DISCHARGE INSTRUCTIONS
Thank you for coming to our Emergency Department today. It is important to remember that some problems are difficult to diagnose and may not be found during your first visit. Be sure to follow up with your primary care doctor and review any labs/imaging that was performed with them. If you do not have a primary care doctor, you may contact the one listed on your discharge paperwork or you may also call the [facility] Appointment Desk at [phone] to schedule an appointment with one.     All medications may potentially have side effects and it is impossible to predict which medications may give you side effects. If you feel that you are having a negative effect of any medication you should immediately stop taking them and seek medical attention.    Return to the ER with any questions/concerns, new/concerning symptoms, worsening or failure to improve. Do not drive or make any important decisions for 24 hours if you have received any pain medications, sedatives or mood altering drugs during your ER visit.

## 2020-07-11 NOTE — ED PROVIDER NOTES
"Encounter Date: 7/11/2020       History     Chief Complaint   Patient presents with    CHEST WALL PAIN     PT C/O PAIN UNDER LEFT BREAST AND LEFT RIBS AND LEFT SHOULDER OFF AND ON FOR 2 -3 DAYS, WORSE WITH MOVEMENT OR PALPATION     54 y.o. male Past Medical History:  10/2019: Chest pain  No date: Cigarette smoker  No date: GERD (gastroesophageal reflux disease)  No date: Hyperlipidemia      Comment:  used to take medication     Notes that he suffers with gerd, last few days has experienced strange sensation under L heart which lasts less than a minute, comes and goes, denies f/c, n/v, diarrhea/dysuria or other complaints. Pt endorsed in triage that the pain is reproduced with palpation or movement of arm. No sob, n/v, diarrhea/dysuria or radiation of pain. Notes at baseline has L shoulder issues.        Review of patient's allergies indicates:  No Known Allergies  Past Medical History:   Diagnosis Date    Chest pain 10/2019    Cigarette smoker     GERD (gastroesophageal reflux disease)     Hyperlipidemia     used to take medication     Past Surgical History:   Procedure Laterality Date    NO PAST SURGERIES  03/06/2020     No family history on file.  Social History     Tobacco Use    Smoking status: Current Every Day Smoker     Packs/day: 0.50     Types: Cigarettes    Smokeless tobacco: Never Used   Substance Use Topics    Alcohol use: Yes     Alcohol/week: 21.0 standard drinks     Types: 21 Cans of beer per week     Comment: 3/6/20: "about 3, everyday after I get off work"    Drug use: No     Review of Systems   Constitutional: Negative for fever.   HENT: Negative for sore throat.    Respiratory: Negative for shortness of breath.    Cardiovascular: Negative for chest pain.   Gastrointestinal: Negative for nausea.   Genitourinary: Negative for dysuria.   Musculoskeletal: Negative for back pain.   Skin: Negative for rash.   Neurological: Negative for weakness.   Hematological: Does not bruise/bleed easily. "   All other systems reviewed and are negative.      Physical Exam     Initial Vitals [07/11/20 0627]   BP Pulse Resp Temp SpO2   125/86 64 20 98.4 °F (36.9 °C) 99 %      MAP       --         Physical Exam    Nursing note and vitals reviewed.  Constitutional: He appears well-developed and well-nourished.   HENT:   Head: Normocephalic and atraumatic.   Eyes: EOM are normal. Pupils are equal, round, and reactive to light.   Cardiovascular: Normal rate and regular rhythm.   Pulmonary/Chest: Effort normal.   Abdominal: He exhibits no distension.   Musculoskeletal: No tenderness or edema.   Neurological: He is alert and oriented to person, place, and time.   Skin: Skin is warm and dry.   Psychiatric: He has a normal mood and affect.         ED Course   Procedures  Labs Reviewed   POCT CBC   POCT CMP   POCT TROPONIN   POCT B-TYPE NATRIURETIC PEPTIDE (BNP)     EKG Readings: (Independently Interpreted)   Hr 66, sinus, nl axis/intervals, no veto, twi III, no stemi, ?u wave       Imaging Results          X-Ray Chest PA And Lateral (In process)                                                  Clinical Impression:       ICD-10-CM ICD-9-CM   1. Chest wall pain  R07.89 786.52                                Bj Woodward MD  07/11/20 0751

## 2020-07-30 ENCOUNTER — HOSPITAL ENCOUNTER (EMERGENCY)
Facility: HOSPITAL | Age: 55
Discharge: HOME | End: 2020-07-30
Attending: EMERGENCY MEDICINE
Payer: MEDICAID

## 2020-07-30 VITALS
TEMPERATURE: 98 F | OXYGEN SATURATION: 97 % | SYSTOLIC BLOOD PRESSURE: 135 MMHG | DIASTOLIC BLOOD PRESSURE: 82 MMHG | RESPIRATION RATE: 17 BRPM | WEIGHT: 215 LBS | HEART RATE: 59 BPM | BODY MASS INDEX: 29.99 KG/M2

## 2020-07-30 DIAGNOSIS — I10 HYPERTENSION, UNSPECIFIED TYPE: Primary | ICD-10-CM

## 2020-07-30 DIAGNOSIS — R42 DIZZINESS: ICD-10-CM

## 2020-07-30 LAB
ALBUMIN SERPL-MCNC: 3.6 G/DL (ref 3.3–5.5)
ALP SERPL-CCNC: 73 U/L (ref 42–141)
BILIRUB SERPL-MCNC: 0.6 MG/DL (ref 0.2–1.6)
BUN SERPL-MCNC: 11 MG/DL (ref 7–22)
CALCIUM SERPL-MCNC: 9.6 MG/DL (ref 8–10.3)
CHLORIDE SERPL-SCNC: 102 MMOL/L (ref 98–108)
CREAT SERPL-MCNC: 1.1 MG/DL (ref 0.6–1.2)
GLUCOSE SERPL-MCNC: 109 MG/DL (ref 73–118)
POC ALT (SGPT): 32 U/L (ref 10–47)
POC AST (SGOT): 32 U/L (ref 11–38)
POC CARDIAC TROPONIN I: 0 NG/ML
POC TCO2: 28 MMOL/L (ref 18–33)
POCT GLUCOSE: 112 MG/DL (ref 70–110)
POTASSIUM BLD-SCNC: 4.4 MMOL/L (ref 3.6–5.1)
PROTEIN, POC: 7.3 G/DL (ref 6.4–8.1)
SAMPLE: NORMAL
SODIUM BLD-SCNC: 146 MMOL/L (ref 128–145)

## 2020-07-30 PROCEDURE — 93005 ELECTROCARDIOGRAM TRACING: CPT | Mod: ER

## 2020-07-30 PROCEDURE — 93010 ELECTROCARDIOGRAM REPORT: CPT | Mod: ,,, | Performed by: INTERNAL MEDICINE

## 2020-07-30 PROCEDURE — 85025 COMPLETE CBC W/AUTO DIFF WBC: CPT | Mod: ER

## 2020-07-30 PROCEDURE — 80053 COMPREHEN METABOLIC PANEL: CPT | Mod: ER

## 2020-07-30 PROCEDURE — 84484 ASSAY OF TROPONIN QUANT: CPT | Mod: ER

## 2020-07-30 PROCEDURE — 25000003 PHARM REV CODE 250: Mod: ER | Performed by: EMERGENCY MEDICINE

## 2020-07-30 PROCEDURE — 93010 EKG 12-LEAD: ICD-10-PCS | Mod: ,,, | Performed by: INTERNAL MEDICINE

## 2020-07-30 PROCEDURE — 99284 EMERGENCY DEPT VISIT MOD MDM: CPT | Mod: 25,ER

## 2020-07-30 RX ORDER — MECLIZINE HYDROCHLORIDE 25 MG/1
25 TABLET ORAL
Status: COMPLETED | OUTPATIENT
Start: 2020-07-30 | End: 2020-07-30

## 2020-07-30 RX ORDER — MECLIZINE HYDROCHLORIDE 25 MG/1
25 TABLET ORAL 3 TIMES DAILY PRN
Qty: 20 TABLET | Refills: 0 | OUTPATIENT
Start: 2020-07-30 | End: 2020-08-22

## 2020-07-30 RX ADMIN — MECLIZINE HYDROCHLORIDE 25 MG: 25 TABLET ORAL at 07:07

## 2020-07-30 NOTE — ED PROVIDER NOTES
"Encounter Date: 7/30/2020       History     Chief Complaint   Patient presents with    Dizziness     Pt states," I was dizzy last night, when I woke this morning I was dizzy. I also have a pain in my left shoulder for a minute."     54-year-old male with history of GERD, hyperlipidemia, former tobacco use presents with dizziness.  He states he 1st noticed feeling dizziness, described as a room spinning sensation, last night when getting up to use the bathroom.  His symptoms resolved after several minutes of sitting down on the toilet.  He again had recurrence of his symptoms this morning after getting up from bed, again, symptoms went away after several minutes of being still.  He had associated nausea.  He denies associated chest pain, shortness of breath, numbness, weakness, visual disturbance or speech difficulty.  He states he took his blood pressure this morning in the top number was in the 170s.  He does not take any medication for blood pressure. He denies any new medicines or changes in medication doses.  He denies recent illness.  He denies episodes similar to this in the past.  Currently denies symptoms.  He also mentions he has had left shoulder issues chronically.        Review of patient's allergies indicates:  No Known Allergies  Past Medical History:   Diagnosis Date    Chest pain 10/2019    Cigarette smoker     GERD (gastroesophageal reflux disease)     Hyperlipidemia     used to take medication     Past Surgical History:   Procedure Laterality Date    NO PAST SURGERIES  03/06/2020     History reviewed. No pertinent family history.  Social History     Tobacco Use    Smoking status: Current Every Day Smoker     Packs/day: 0.50     Types: Cigarettes    Smokeless tobacco: Never Used   Substance Use Topics    Alcohol use: Yes     Alcohol/week: 21.0 standard drinks     Types: 21 Cans of beer per week     Comment: 3/6/20: "about 3, everyday after I get off work"    Drug use: No     Review of " Systems   Constitutional: Negative for chills and fever.   HENT: Negative for congestion and sore throat.    Eyes: Negative for visual disturbance.   Respiratory: Negative for cough and shortness of breath.    Cardiovascular: Negative for chest pain.   Gastrointestinal: Negative for abdominal pain, nausea and vomiting.   Genitourinary: Negative for dysuria.   Skin: Negative for rash.   Neurological: Positive for dizziness. Negative for speech difficulty, weakness, light-headedness, numbness and headaches.   Psychiatric/Behavioral: Negative for confusion.       Physical Exam     Initial Vitals [07/30/20 0659]   BP Pulse Resp Temp SpO2   (!) 142/87 67 16 98.4 °F (36.9 °C) 99 %      MAP       --         Physical Exam    Nursing note and vitals reviewed.  Constitutional: He appears well-developed and well-nourished. He is not diaphoretic. No distress.   HENT:   Head: Normocephalic and atraumatic.   Right Ear: Tympanic membrane normal.   Left Ear: Tympanic membrane normal.   Mouth/Throat: Oropharynx is clear and moist.   Eyes: EOM are normal. Pupils are equal, round, and reactive to light.   Neck: Neck supple.   Cardiovascular: Normal rate and regular rhythm.   Pulmonary/Chest: Breath sounds normal. No respiratory distress.   Abdominal: Soft. Bowel sounds are normal.   Musculoskeletal: No edema.   Neurological: He is alert and oriented to person, place, and time. He has normal strength. No cranial nerve deficit or sensory deficit. GCS score is 15. GCS eye subscore is 4. GCS verbal subscore is 5. GCS motor subscore is 6.   No pronator drift.  Normal finger-to-nose testing.  Patient ambulates with steady gait.  Rafal-Hallpike maneuver does not reproduce patient's symptoms.   Skin: Skin is warm and dry.   Psychiatric: He has a normal mood and affect.         ED Course   Procedures  Labs Reviewed   POCT CMP - Abnormal; Notable for the following components:       Result Value    POC Sodium 146 (*)     All other components  within normal limits   TROPONIN ISTAT   POCT CBC     EKG Readings: (Independently Interpreted)   Normal sinus rhythm, rate 63 beats per minute, normal AL interval,  milliseconds.  No STEMI.       Imaging Results          X-Ray Chest AP Portable (Final result)  Result time 07/30/20 07:55:25    Final result by Evan Singh MD (07/30/20 07:55:25)                 Impression:      See above      Electronically signed by: Evan Singh MD  Date:    07/30/2020  Time:    07:55             Narrative:    EXAMINATION:  XR CHEST AP PORTABLE    CLINICAL HISTORY:  dizziness;    TECHNIQUE:  Single frontal view of the chest was performed.    COMPARISON:  No 07/11/2020 ne    FINDINGS:  Heart size normal.  The lungs are clear.  No pleural effusion                                 Medical Decision Making:   Initial Assessment:   54-year-old male presents after 2 episodes of dizziness, both episodes lasting several minutes and fatiguing with rest.  He notes his blood pressure was elevated this morning.  On exam, this patient is very well-appearing.  He has no focal neurologic deficits.  He ambulates with a steady gait.  His tympanic membranes are clear bilaterally.  His symptoms are not reproducible.  Differential includes but not limited to vertigo, high blood pressure, orthostasis, electrolyte disturbance.  As his symptoms fatigued after several minutes, I do not suspect central cause of vertigo such as CVA.  I will check basic labs, EKG, chest x-ray, orthostatics.  Will give dose of meclizine.  ED Management:  Patient's labs within acceptable limits, troponin negative.  Chest x-ray without focal finding.  Orthostatics negative.  I reviewed his chart and see that in the past, he has had some visits where his blood pressures been elevated in others where his blood pressure has been within normal limits.  I have encouraged him to keep a blood pressure log at home.  I will prescribe him meclizine to take if needed for recurrence  of dizziness.  I encouraged him to eat a low-salt diet.  I advised him to follow up with his primary care physician in 1-2 weeks for blood pressure check and encouraged him to bring his blood pressure log with him to his visit.  Patient states he understands and agrees with plan.                                 Clinical Impression:       ICD-10-CM ICD-9-CM   1. Hypertension, unspecified type  I10 401.9   2. Dizziness  R42 780.4             ED Disposition Condition    Discharge Stable        ED Prescriptions     Medication Sig Dispense Start Date End Date Auth. Provider    meclizine (ANTIVERT) 25 mg tablet Take 1 tablet (25 mg total) by mouth 3 (three) times daily as needed for Dizziness. 20 tablet 7/30/2020  Nila Guan MD        Follow-up Information     Follow up With Specialties Details Why Contact Info    [facility] Emergency [facility]  As needed, If symptoms worsen [address] [facility] [id number] [phone]    [facility]  Schedule an appointment as soon as possible for a visit in 1 week To recheck your symptoms, To recheck your blood pressure [address] [place] [address]  [phone]                              This dictation has been generated using M-Modal Fluency Direct dictation; some phonetic errors may occur.            Nila Guan MD  07/30/20 4336

## 2020-08-01 ENCOUNTER — HOSPITAL ENCOUNTER (EMERGENCY)
Facility: HOSPITAL | Age: 55
Discharge: HOME | End: 2020-08-01
Attending: EMERGENCY MEDICINE
Payer: MEDICAID

## 2020-08-01 VITALS
HEART RATE: 58 BPM | OXYGEN SATURATION: 96 % | WEIGHT: 215 LBS | BODY MASS INDEX: 30.1 KG/M2 | SYSTOLIC BLOOD PRESSURE: 129 MMHG | RESPIRATION RATE: 16 BRPM | TEMPERATURE: 99 F | HEIGHT: 71 IN | DIASTOLIC BLOOD PRESSURE: 73 MMHG

## 2020-08-01 DIAGNOSIS — R03.0 BLOOD PRESSURE ELEVATED WITHOUT HISTORY OF HTN: ICD-10-CM

## 2020-08-01 LAB
ALBUMIN SERPL-MCNC: 4 G/DL (ref 3.3–5.5)
ALP SERPL-CCNC: 73 U/L (ref 42–141)
BILIRUB SERPL-MCNC: 0.7 MG/DL (ref 0.2–1.6)
BUN SERPL-MCNC: 12 MG/DL (ref 7–22)
CALCIUM SERPL-MCNC: 9.8 MG/DL (ref 8–10.3)
CHLORIDE SERPL-SCNC: 107 MMOL/L (ref 98–108)
CREAT SERPL-MCNC: 1.1 MG/DL (ref 0.6–1.2)
GLUCOSE SERPL-MCNC: 79 MG/DL (ref 73–118)
POC ALT (SGPT): 42 U/L (ref 10–47)
POC AST (SGOT): 34 U/L (ref 11–38)
POC CARDIAC TROPONIN I: 0 NG/ML
POC TCO2: 27 MMOL/L (ref 18–33)
POTASSIUM BLD-SCNC: 3.7 MMOL/L (ref 3.6–5.1)
PROTEIN, POC: 7.5 G/DL (ref 6.4–8.1)
SAMPLE: NORMAL
SODIUM BLD-SCNC: 142 MMOL/L (ref 128–145)

## 2020-08-01 PROCEDURE — 80053 COMPREHEN METABOLIC PANEL: CPT | Mod: ER

## 2020-08-01 PROCEDURE — 93005 ELECTROCARDIOGRAM TRACING: CPT | Mod: ER

## 2020-08-01 PROCEDURE — 85025 COMPLETE CBC W/AUTO DIFF WBC: CPT | Mod: ER

## 2020-08-01 PROCEDURE — 84484 ASSAY OF TROPONIN QUANT: CPT | Mod: ER

## 2020-08-01 PROCEDURE — 81003 URINALYSIS AUTO W/O SCOPE: CPT | Mod: ER

## 2020-08-01 PROCEDURE — 93010 ELECTROCARDIOGRAM REPORT: CPT | Mod: ,,, | Performed by: INTERNAL MEDICINE

## 2020-08-01 PROCEDURE — 93010 EKG 12-LEAD: ICD-10-PCS | Mod: ,,, | Performed by: INTERNAL MEDICINE

## 2020-08-01 PROCEDURE — 99284 EMERGENCY DEPT VISIT MOD MDM: CPT | Mod: 25,ER

## 2020-08-01 NOTE — ED TRIAGE NOTES
Pt to er due to HTN--denies any CP, SOB, HA, and dizziness--pt had a HA when BP was elevated at home--no symptoms at present

## 2020-08-01 NOTE — ED PROVIDER NOTES
"Encounter Date: 8/1/2020    SCRIBE #1 NOTE: I, Nadiya Brewer, am scribing for, and in the presence of,  Dr. Fairchild. I have scribed the following portions of the note - Other sections scribed: HPI, ROS, PE.       History     Chief Complaint   Patient presents with    Hypertension     High blood pressure at home, states systolic of 180's, now 140/86.     Braeden Rowland is a 54 y.o. male with a history of hypertension who presents to the ED complaining of headache, dizziness, palpitations and high blood pressure reading at home today. He reports systolic 181 at home. He reports that he was getting ready to cook when he began "feeling funny." Dizziness now resolved. Denies numbness, weakness, tingling, fever, chills, n/v/d, chest pain, SOB at this time. Took 2 aspirin today after onset of sx, but does not typically take anything for blood pressure.  Patient reports that he was seen here for dizziness 2 days ago, and was prescribed meclizine. He reports that he wasn't feeling dizzy this morning, but still took a dose of the meclizine.    The history is provided by the patient. No  was used.     Review of patient's allergies indicates:  No Known Allergies  Past Medical History:   Diagnosis Date    Chest pain 10/2019    Cigarette smoker     GERD (gastroesophageal reflux disease)     Hyperlipidemia     used to take medication     Past Surgical History:   Procedure Laterality Date    NO PAST SURGERIES  03/06/2020     No family history on file.  Social History     Tobacco Use    Smoking status: Current Every Day Smoker     Packs/day: 0.50     Types: Cigarettes    Smokeless tobacco: Never Used   Substance Use Topics    Alcohol use: Yes     Alcohol/week: 21.0 standard drinks     Types: 21 Cans of beer per week     Comment: 3/6/20: "about 3, everyday after I get off work"    Drug use: No     Review of Systems   Constitutional: Negative for chills and fever.   HENT: Negative for sore throat.  "   Respiratory: Negative for shortness of breath.    Cardiovascular: Positive for palpitations. Negative for chest pain.   Gastrointestinal: Negative for diarrhea, nausea and vomiting.   Genitourinary: Negative for dysuria.   Musculoskeletal: Negative for back pain.   Skin: Negative for rash.   Neurological: Positive for dizziness and headaches. Negative for weakness and numbness.   Hematological: Does not bruise/bleed easily.   All other systems reviewed and are negative.      Physical Exam     Initial Vitals [08/01/20 1244]   BP Pulse Resp Temp SpO2   (!) 140/76 71 18 98.7 °F (37.1 °C) 98 %      MAP       --         Physical Exam    Nursing note and vitals reviewed.  Constitutional: He appears well-developed and well-nourished.   HENT:   Head: Normocephalic and atraumatic.   Right Ear: External ear normal.   Left Ear: External ear normal.   Nose: Nose normal.   Mouth/Throat: Oropharynx is clear and moist.   Eyes: Conjunctivae and EOM are normal. Pupils are equal, round, and reactive to light.   Neck: Normal range of motion. Neck supple.   Cardiovascular: Normal rate, regular rhythm, normal heart sounds and intact distal pulses. Exam reveals no gallop and no friction rub.    No murmur heard.  Pulmonary/Chest: Breath sounds normal. No stridor. No respiratory distress. He has no wheezes. He has no rhonchi. He has no rales. He exhibits no tenderness.   Abdominal: Soft. Bowel sounds are normal. He exhibits no distension and no mass. There is no abdominal tenderness. There is no rigidity, no rebound and no guarding.   Musculoskeletal: Normal range of motion.   Neurological: He is alert and oriented to person, place, and time. No cranial nerve deficit or sensory deficit. GCS score is 15. GCS eye subscore is 4. GCS verbal subscore is 5. GCS motor subscore is 6.   Skin: Skin is warm and dry. Capillary refill takes less than 2 seconds. No rash noted.   Psychiatric: He has a normal mood and affect. His behavior is normal.          ED Course   Procedures  Labs Reviewed   TROPONIN ISTAT   POCT CBC   POCT URINALYSIS W/O SCOPE   POCT CMP   POCT TROPONIN   POCT CMP             EKG Readings: (Independently Interpreted)   Initial Reading: No STEMI. Rhythm: Normal Sinus Rhythm. Heart Rate: 69. Axis: Normal. Other Impression: Abnormal EKG. QTc 400. When compared to prior EKG performed on 07/30/20, rate has increased by 6 BPM today.       Imaging Results          X-Ray Chest PA And Lateral (Final result)  Result time 08/01/20 13:46:21    Final result by Homa Ellsworth MD (08/01/20 13:46:21)                 Impression:      Normal chest radiograph.      Electronically signed by: Homa Ellsworth MD  Date:    08/01/2020  Time:    13:46             Narrative:    EXAMINATION:  XR CHEST PA AND LATERAL    CLINICAL HISTORY:  History of elevated blood pressure;    TECHNIQUE:  PA and lateral views of the chest were performed.    COMPARISON:  Prior dated 07/30/2020    FINDINGS:  The mediastinal structures are midline.  The cardiac silhouette is not enlarged.  There is no evidence of acute pulmonary disease, pleural disease, lymph node enlargement, or cardiac decompensation.  No osseous abnormalities are identified.                                 Medical Decision Making:   History:   Old Medical Records: I decided to obtain old medical records.  Independently Interpreted Test(s):   I have ordered and independently interpreted X-rays - see prior notes.  I have ordered and independently interpreted EKG Reading(s) - see prior notes  Clinical Tests:   Lab Tests: Ordered and Reviewed  Radiological Study: Ordered and Reviewed  Medical Tests: Ordered and Reviewed    Chief complaint: headache  Differential diagnosis: medication reaction, cardiac arrhythmia, STEMI, NSTEMI, hypertension, renal failure    Treatment in the ED: PE,  Patient reports feeling better after treatment in the ER.      Discussed taking blood pressure, outpatient follow-up, labs, and  imaging results.      Fill and take prescriptions as directed.  Return to the ED if symptoms worsen or do not resolve.   Answered questions and discussed discharge plan.    Patient feels better and is ready for discharge.  Follow up with PCP/specialist in 1 day.            Scribe Attestation:   Scribe #1: I performed the above scribed service and the documentation accurately describes the services I performed. I attest to the accuracy of the note.     I, Dr. Lacy Fairchild, personally performed the services described in this documentation. This document was produced by a scribe under my direction and in my presence. All medical record entries made by the scribe were at my direction and in my presence.  I have reviewed the chart and agree that the record reflects my personal performance and is accurate and complete. Lacy Fairchild, .     08/01/2020 3:03 PM                        Clinical Impression:     1. Blood pressure elevated at home without history of HTN                                   Lacy Fairchild DO  08/01/20 1508

## 2020-08-01 NOTE — ED NOTES
Pt reports he also had a HA at home when his pressure was high, now resolved, denies dizziness, denies CP, denies SOB.

## 2020-08-22 ENCOUNTER — HOSPITAL ENCOUNTER (EMERGENCY)
Facility: HOSPITAL | Age: 55
Discharge: HOME | End: 2020-08-22
Attending: EMERGENCY MEDICINE
Payer: MEDICAID

## 2020-08-22 VITALS
HEART RATE: 78 BPM | BODY MASS INDEX: 30.1 KG/M2 | WEIGHT: 215 LBS | OXYGEN SATURATION: 98 % | HEIGHT: 71 IN | DIASTOLIC BLOOD PRESSURE: 91 MMHG | TEMPERATURE: 99 F | SYSTOLIC BLOOD PRESSURE: 155 MMHG | RESPIRATION RATE: 20 BRPM

## 2020-08-22 DIAGNOSIS — M54.10 RADICULOPATHY, UNSPECIFIED SPINAL REGION: ICD-10-CM

## 2020-08-22 DIAGNOSIS — M25.512 LEFT SHOULDER PAIN: Primary | ICD-10-CM

## 2020-08-22 DIAGNOSIS — R03.0 ELEVATED BLOOD PRESSURE READING: ICD-10-CM

## 2020-08-22 DIAGNOSIS — R07.9 CHEST PAIN, UNSPECIFIED TYPE: ICD-10-CM

## 2020-08-22 PROCEDURE — 63600175 PHARM REV CODE 636 W HCPCS: Performed by: EMERGENCY MEDICINE

## 2020-08-22 PROCEDURE — 96372 THER/PROPH/DIAG INJ SC/IM: CPT

## 2020-08-22 PROCEDURE — 93010 EKG 12-LEAD: ICD-10-PCS | Mod: ,,, | Performed by: INTERNAL MEDICINE

## 2020-08-22 PROCEDURE — 93005 ELECTROCARDIOGRAM TRACING: CPT

## 2020-08-22 PROCEDURE — 93010 ELECTROCARDIOGRAM REPORT: CPT | Mod: ,,, | Performed by: INTERNAL MEDICINE

## 2020-08-22 PROCEDURE — 99284 EMERGENCY DEPT VISIT MOD MDM: CPT | Mod: 25

## 2020-08-22 RX ORDER — MELOXICAM 15 MG/1
15 TABLET ORAL DAILY
Qty: 20 TABLET | Refills: 0 | Status: SHIPPED | OUTPATIENT
Start: 2020-08-22 | End: 2020-10-08 | Stop reason: ALTCHOICE

## 2020-08-22 RX ORDER — MELOXICAM 15 MG/1
15 TABLET ORAL DAILY
Qty: 20 TABLET | Refills: 0 | Status: SHIPPED | OUTPATIENT
Start: 2020-08-22 | End: 2020-08-22 | Stop reason: SDUPTHER

## 2020-08-22 RX ORDER — KETOROLAC TROMETHAMINE 30 MG/ML
30 INJECTION, SOLUTION INTRAMUSCULAR; INTRAVENOUS
Status: COMPLETED | OUTPATIENT
Start: 2020-08-22 | End: 2020-08-22

## 2020-08-22 RX ORDER — DICLOFENAC SODIUM 10 MG/G
2 GEL TOPICAL 4 TIMES DAILY
Qty: 150 G | Refills: 0 | Status: SHIPPED | OUTPATIENT
Start: 2020-08-22 | End: 2020-08-22 | Stop reason: ALTCHOICE

## 2020-08-22 RX ADMIN — KETOROLAC TROMETHAMINE 30 MG: 30 INJECTION, SOLUTION INTRAMUSCULAR at 08:08

## 2020-08-22 NOTE — ED TRIAGE NOTES
"The patient presents to the ED via personal transportation alone. The patient reports left shoulder pain that radiates down to left side and left arm. Patient reports that he has been recently doing heavy lifting at work and exercises lifting wrights. Denies trauma. Denies numbness/tingling to the extremity. Patient states, "It' started hurting this week".    "

## 2020-08-22 NOTE — ED PROVIDER NOTES
"Encounter Date: 8/22/2020       History     Chief Complaint   Patient presents with    Shoulder Pain     left shoulder pain radiating down arm .started yesterday     54-year-old male with history of GERD, hyperlipidemia, borderline hypertension presents with left shoulder pain.  This is an acute exacerbation of chronic left shoulder pain.  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  This morning, he notes that his left shoulder hurts.  He reports pain that is sharp that radiates from the top of his shoulder down the lateral aspect of his left chest.  Pain is exacerbated with movement, especially lifting overhead.  He was seen for this problem in June at the [facility] ER, at that time, he had an x-ray of his left shoulder without acute finding.  He has not taken any medications for his symptoms.    Of note, patient's blood pressure is elevated.  States he is followed at the [facility].  He is not on blood pressure medicine and states that when he followed up at the [facility] his blood pressure was normal. He has been seen multiple times in the ER in the last 2 months for chest pain, high blood pressure, left shoulder pain. He has been referred to cardiology but has not followed up.        Review of patient's allergies indicates:  No Known Allergies  Past Medical History:   Diagnosis Date    Chest pain 10/2019    Cigarette smoker     GERD (gastroesophageal reflux disease)     Hyperlipidemia     used to take medication     Past Surgical History:   Procedure Laterality Date    NO PAST SURGERIES  03/06/2020     History reviewed. No pertinent family history.  Social History     Tobacco Use    Smoking status: Former Smoker     Packs/day: 0.50     Types: Cigarettes    Smokeless tobacco: Never Used   Substance Use Topics    Alcohol use: Yes     Alcohol/week: 21.0 standard drinks     Types: 21 Cans of beer per week     Comment: 3/6/20: "about 3, everyday " "after I get off work"    Drug use: No     Review of Systems   Constitutional: Negative for chills and fever.   HENT: Negative for congestion and sore throat.    Eyes: Negative for visual disturbance.   Respiratory: Negative for cough and shortness of breath.    Cardiovascular: Negative for chest pain.   Gastrointestinal: Negative for abdominal pain, nausea and vomiting.   Genitourinary: Negative for dysuria.   Musculoskeletal: Positive for arthralgias.   Skin: Negative for rash.   Neurological: Negative for headaches.   Psychiatric/Behavioral: Negative for confusion.       Physical Exam     Initial Vitals [08/22/20 0810]   BP Pulse Resp Temp SpO2   (!) 151/92 69 18 98.5 °F (36.9 °C) 98 %      MAP       --         Physical Exam    Nursing note and vitals reviewed.  Constitutional: He appears well-developed and well-nourished. He is not diaphoretic. No distress.   HENT:   Head: Normocephalic and atraumatic.   Eyes: Conjunctivae are normal.   Neck: Neck supple.   Cardiovascular: Normal rate and regular rhythm.   Pulses:       Radial pulses are 2+ on the right side and 2+ on the left side.   Pulmonary/Chest: No respiratory distress.   No tenderness or deformity of left lateral chest wall   Abdominal: Soft. Bowel sounds are normal.   Musculoskeletal: No edema.      Left shoulder: He exhibits normal range of motion, no bony tenderness, no crepitus, no deformity, normal pulse and normal strength.        Arms:       Comments: Tender to palpation at left AC joint, 5/5 left upper arm abduction, flexion, extension      Neurological: He is alert and oriented to person, place, and time.   Sensation to light touch intact in radial, ulnar, median nerve distribution left hand   Skin: Skin is warm and dry.   Psychiatric: He has a normal mood and affect.         ED Course   Procedures  Labs Reviewed - No data to display  EKG Readings: (Independently Interpreted)   Normal sinus rhythm, rate 70 bpm, normal [place] interval, QTc 427 ms/  "       Imaging Results    None          Medical Decision Making:   Initial Assessment:   54 year old male presents with acute exacerbation of left shoulder pain. Exam notable for normal ROM, NV intact, no skin changes. Suspect OA v. Rotator cuff disfunction v. Shoulder impingement. Do not suspect fracture, dislocation, ACS. Will get screening ECG, treat with NSAIDs, refer to orthopedics. I have also re referred patient to cardiology clinic as he has not followed up.                                 Clinical Impression:       ICD-10-CM ICD-9-CM   1. Left shoulder pain  M25.512 719.41   2. Radiculopathy, unspecified spinal region  M54.10 729.2   3. Elevated blood pressure reading  R03.0 796.2   4. Chest pain, unspecified type  R07.9 786.50             ED Disposition Condition    Discharge Stable        ED Prescriptions     Medication Sig Dispense Start Date End Date Auth. Provider    meloxicam (MOBIC) 15 MG tablet  (Status: Discontinued) Take 1 tablet (15 mg total) by mouth once daily. 20 tablet 8/22/2020 8/22/2020 Nila Guan MD    diclofenac sodium (VOLTAREN) 1 % Gel  (Status: Discontinued) Apply 2 g topically 4 (four) times daily. 150 g 8/22/2020 8/22/2020 Nila Guan MD    meloxicam (MOBIC) 15 MG tablet Take 1 tablet (15 mg total) by mouth once daily. 20 tablet 8/22/2020  Nila Guan MD        Follow-up Information     Follow up With Specialties Details Why Contact Info    Renu Vergara MD Orthopedic Surgery Schedule an appointment as soon as possible for a visit in 1 week  [address] [facility] [address]  [phone]      [facility]  Schedule an appointment as soon as possible for a visit in 2 weeks To recheck your blood pressure [address] [place] [address]  [phone]      [facility] Emergency [facility]  As needed, If symptoms worsen [address]  [facility] [address] [phone]    Brett Phillips MD Cardiology,  INTERVENTIONAL CARDIOLOGY Schedule an appointment as soon as possible for a visit  for evaluation of chest pain [address]  [address]  [facility] [address]  [phone]                            This dictation has been generated using M-Modal Fluency Direct dictation; some phonetic errors may occur.              Nila Guan MD  08/22/20 0929

## 2020-09-05 ENCOUNTER — HOSPITAL ENCOUNTER (EMERGENCY)
Facility: HOSPITAL | Age: 55
Discharge: HOME | End: 2020-09-05
Attending: EMERGENCY MEDICINE
Payer: MEDICAID

## 2020-09-05 VITALS
DIASTOLIC BLOOD PRESSURE: 77 MMHG | SYSTOLIC BLOOD PRESSURE: 127 MMHG | HEART RATE: 74 BPM | TEMPERATURE: 99 F | RESPIRATION RATE: 18 BRPM | OXYGEN SATURATION: 99 %

## 2020-09-05 DIAGNOSIS — M54.50 ACUTE LEFT-SIDED LOW BACK PAIN WITHOUT SCIATICA: Primary | ICD-10-CM

## 2020-09-05 PROCEDURE — 96372 THER/PROPH/DIAG INJ SC/IM: CPT | Mod: ER

## 2020-09-05 PROCEDURE — 63600175 PHARM REV CODE 636 W HCPCS: Mod: ER | Performed by: EMERGENCY MEDICINE

## 2020-09-05 PROCEDURE — 99284 EMERGENCY DEPT VISIT MOD MDM: CPT | Mod: 25,ER

## 2020-09-05 RX ORDER — CYCLOBENZAPRINE HCL 10 MG
10 TABLET ORAL 3 TIMES DAILY PRN
Qty: 15 TABLET | Refills: 0 | Status: SHIPPED | OUTPATIENT
Start: 2020-09-05 | End: 2020-09-10

## 2020-09-05 RX ORDER — KETOROLAC TROMETHAMINE 30 MG/ML
30 INJECTION, SOLUTION INTRAMUSCULAR; INTRAVENOUS
Status: COMPLETED | OUTPATIENT
Start: 2020-09-05 | End: 2020-09-05

## 2020-09-05 RX ORDER — DICLOFENAC SODIUM 10 MG/G
2 GEL TOPICAL 4 TIMES DAILY PRN
Qty: 1 TUBE | Refills: 0 | OUTPATIENT
Start: 2020-09-05 | End: 2021-09-15

## 2020-09-05 RX ORDER — DEXAMETHASONE SODIUM PHOSPHATE 4 MG/ML
12 INJECTION, SOLUTION INTRA-ARTICULAR; INTRALESIONAL; INTRAMUSCULAR; INTRAVENOUS; SOFT TISSUE
Status: COMPLETED | OUTPATIENT
Start: 2020-09-05 | End: 2020-09-05

## 2020-09-05 RX ORDER — IBUPROFEN 600 MG/1
600 TABLET ORAL EVERY 6 HOURS PRN
Qty: 20 TABLET | Refills: 0 | OUTPATIENT
Start: 2020-09-05 | End: 2021-08-23

## 2020-09-05 RX ORDER — ACETAMINOPHEN 500 MG
1000 TABLET ORAL EVERY 6 HOURS PRN
Qty: 30 TABLET | Refills: 0 | Status: SHIPPED | OUTPATIENT
Start: 2020-09-05 | End: 2020-10-25 | Stop reason: SDUPTHER

## 2020-09-05 RX ADMIN — KETOROLAC TROMETHAMINE 30 MG: 30 INJECTION, SOLUTION INTRAMUSCULAR at 09:09

## 2020-09-05 RX ADMIN — DEXAMETHASONE SODIUM PHOSPHATE 12 MG: 4 INJECTION, SOLUTION INTRAMUSCULAR; INTRAVENOUS at 09:09

## 2020-09-05 NOTE — ED PROVIDER NOTES
"Encounter Date: 9/5/2020    SCRIBE #1 NOTE: I, Nadiya Brewer, am scribing for, and in the presence of,  Dr. Fairchild. I have scribed the following portions of the note - Other sections scribed: HPI, ROS, PE.       History     Chief Complaint   Patient presents with    Flank Pain     7/10 pt report left flank pain that started  on Thur, states unable to stand. States he applied heat during the night which made pain lessen     Braeden Rowland is a 54 y.o. male who presents to the ED complaining of left-sided low back pain x 3 days after "tweaking" it. Pain does not radiate down the left leg. He has been applying a hot compress, but has not taken any medication for the pain. Denies bladder or bowel incontinence, dysuria, hematuria. Patient reports that he had a similar episode of pain before, "a long time ago." Patient drove himself to the ED today.    The history is provided by the patient. No  was used.     Review of patient's allergies indicates:  No Known Allergies  Past Medical History:   Diagnosis Date    Chest pain 10/2019    Cigarette smoker     GERD (gastroesophageal reflux disease)     Hyperlipidemia     used to take medication     Past Surgical History:   Procedure Laterality Date    NO PAST SURGERIES  03/06/2020     No family history on file.  Social History     Tobacco Use    Smoking status: Former Smoker     Packs/day: 0.50     Types: Cigarettes    Smokeless tobacco: Never Used   Substance Use Topics    Alcohol use: Yes     Alcohol/week: 21.0 standard drinks     Types: 21 Cans of beer per week     Comment: 3/6/20: "about 3, everyday after I get off work"    Drug use: No     Review of Systems   Constitutional: Negative for fever.   HENT: Negative for sore throat.    Respiratory: Negative for shortness of breath.    Cardiovascular: Negative for chest pain.   Gastrointestinal: Negative for nausea.   Genitourinary: Negative for dysuria, enuresis and hematuria.   Musculoskeletal: Positive " for back pain.   Skin: Negative for rash.   Neurological: Negative for weakness.   Hematological: Does not bruise/bleed easily.   All other systems reviewed and are negative.      Physical Exam     Initial Vitals [09/05/20 0842]   BP Pulse Resp Temp SpO2   127/77 74 18 98.6 °F (37 °C) 99 %      MAP       --         Physical Exam    Nursing note and vitals reviewed.  Constitutional: He appears well-developed and well-nourished.   HENT:   Head: Normocephalic and atraumatic.   Right Ear: External ear normal.   Left Ear: External ear normal.   Nose: Nose normal.   Mouth/Throat: Oropharynx is clear and moist.   Eyes: Conjunctivae and EOM are normal. Pupils are equal, round, and reactive to light.   Neck: Normal range of motion. Neck supple.   Cardiovascular: Normal rate, regular rhythm, normal heart sounds and intact distal pulses. Exam reveals no gallop and no friction rub.    No murmur heard.  Pulmonary/Chest: Breath sounds normal. No stridor. No respiratory distress. He has no wheezes. He has no rhonchi. He has no rales. He exhibits no tenderness.   Abdominal: Soft. Bowel sounds are normal. He exhibits no distension and no mass. There is no abdominal tenderness. There is no rigidity, no rebound and no guarding.   Musculoskeletal: Normal range of motion.      Lumbar back: He exhibits tenderness.        Back:    Neurological: He is alert and oriented to person, place, and time. No cranial nerve deficit or sensory deficit. GCS score is 15. GCS eye subscore is 4. GCS verbal subscore is 5. GCS motor subscore is 6.   Skin: Skin is warm and dry. Capillary refill takes less than 2 seconds. No rash noted.   Psychiatric: He has a normal mood and affect. His behavior is normal.         ED Course   Procedures  Labs Reviewed - No data to display       Imaging Results    None          Medical Decision Making:   History:   Old Medical Records: I decided to obtain old medical records.  ED Management:  Patient denies urinary symptoms  and does not want to give a UA. Reports that he has had similar episodes of low back pain before, and aggravated it doing work on Thursday; just wants the pain treated today. Patient requesting injections.    Chief complaint: lumbar back pain  Differential diagnosis: muscle spasm, muscle strain, low back pain, sciatica, lumbago    Treatment in the ED: PE, ketorolac injection 30 mg, dexamethasone injection 12 mg  Patient reports feeling better after treatment in the ER.      Discussed treatment, prescriptions, labs, and imaging results.    Discharge home with   ibuprofen (ADVIL,MOTRIN) 600 MG tablet     acetaminophen (TYLENOL) 500 MG tablet      diclofenac sodium (VOLTAREN) 1 % Gel     cyclobenzaprine (FLEXERIL) 10 MG tablet     Fill and take prescriptions as directed.  Return to the ED if symptoms worsen or do not resolve.   Answered questions and discussed discharge plan.    Patient feels better and is ready for discharge.  Follow up with PCP/specialist in 1 day.            Scribe Attestation:   Scribe #1: I performed the above scribed service and the documentation accurately describes the services I performed. I attest to the accuracy of the note.     I, Dr. Lacy Fairchild, personally performed the services described in this documentation. This document was produced by a scribe under my direction and in my presence. All medical record entries made by the scribe were at my direction and in my presence.  I have reviewed the chart and agree that the record reflects my personal performance and is accurate and complete. Lacy Fairchild, DO.     09/05/2020 5:56 PM                        Clinical Impression:     1. Acute left-sided low back pain without sciatica                ED Disposition Condition    Discharge Stable        ED Prescriptions     Medication Sig Dispense Start Date End Date Auth. Provider    ibuprofen (ADVIL,MOTRIN) 600 MG tablet Take 1 tablet (600 mg total) by mouth every 6 (six) hours as needed  for Pain (Take with food as needed for mild-to-moderate pain). 20 tablet 9/5/2020  Lacy Fairchild, DO    acetaminophen (TYLENOL) 500 MG tablet Take 2 tablets (1,000 mg total) by mouth every 6 (six) hours as needed for Pain. 30 tablet 9/5/2020  Lacy Fairchild, DO    diclofenac sodium (VOLTAREN) 1 % Gel Apply 2 g topically 4 (four) times daily as needed (Apply to painful area up to 4 times a day as needed for pain). Apply to painful area 4 times a day as needed for pain 1 Tube 9/5/2020 9/15/2020 Lacy Fairchild DO    cyclobenzaprine (FLEXERIL) 10 MG tablet Take 1 tablet (10 mg total) by mouth 3 (three) times daily as needed. 15 tablet 9/5/2020 9/10/2020 Lacy Fairchild DO        Follow-up Information     Follow up With Specialties Details Why Contact Info    [facility] Munir Hernandez  Schedule an appointment as soon as possible for a visit in 1 day Please establish a primary care physician [address] [facility]  [phone]      Marleen Crenshaw PA-C Neurosurgery Schedule an appointment as soon as possible for a visit in 1 day For further evaluation of back pain [address]  [address]  [place] [address]  [phone]                                       Lacy Fairchild DO  09/05/20 5923

## 2020-09-05 NOTE — Clinical Note
Braeden WEST Kashif was seen and treated in our emergency department on 9/5/2020.  He may return to work on 09/08/2020.       If you have any questions or concerns, please don't hesitate to call.      Lacy Fairchild, DO

## 2020-09-14 ENCOUNTER — HOSPITAL ENCOUNTER (EMERGENCY)
Facility: HOSPITAL | Age: 55
Discharge: HOME | End: 2020-09-14
Attending: EMERGENCY MEDICINE
Payer: MEDICAID

## 2020-09-14 ENCOUNTER — OFFICE VISIT (OUTPATIENT)
Dept: URGENT CARE | Facility: CLINIC | Age: 55
End: 2020-09-14
Payer: MEDICAID

## 2020-09-14 VITALS
SYSTOLIC BLOOD PRESSURE: 129 MMHG | TEMPERATURE: 98 F | WEIGHT: 250 LBS | BODY MASS INDEX: 35 KG/M2 | HEIGHT: 71 IN | HEART RATE: 83 BPM | OXYGEN SATURATION: 98 % | DIASTOLIC BLOOD PRESSURE: 77 MMHG | RESPIRATION RATE: 18 BRPM

## 2020-09-14 VITALS
OXYGEN SATURATION: 96 % | SYSTOLIC BLOOD PRESSURE: 135 MMHG | BODY MASS INDEX: 30.1 KG/M2 | HEART RATE: 96 BPM | HEIGHT: 71 IN | WEIGHT: 215 LBS | TEMPERATURE: 97 F | RESPIRATION RATE: 20 BRPM | DIASTOLIC BLOOD PRESSURE: 78 MMHG

## 2020-09-14 DIAGNOSIS — R00.2 PALPITATIONS: Primary | ICD-10-CM

## 2020-09-14 DIAGNOSIS — M19.012 PRIMARY OSTEOARTHRITIS OF LEFT SHOULDER: ICD-10-CM

## 2020-09-14 DIAGNOSIS — Z87.898 HISTORY OF PALPITATIONS: ICD-10-CM

## 2020-09-14 DIAGNOSIS — Z87.19 HISTORY OF GASTROESOPHAGEAL REFLUX (GERD): ICD-10-CM

## 2020-09-14 LAB
ALBUMIN SERPL-MCNC: 3.8 G/DL (ref 3.3–5.5)
ALP SERPL-CCNC: 57 U/L (ref 42–141)
BILIRUB SERPL-MCNC: 0.5 MG/DL (ref 0.2–1.6)
BUN SERPL-MCNC: 11 MG/DL (ref 7–22)
CALCIUM SERPL-MCNC: 9.4 MG/DL (ref 8–10.3)
CHLORIDE SERPL-SCNC: 101 MMOL/L (ref 98–108)
CREAT SERPL-MCNC: 0.9 MG/DL (ref 0.6–1.2)
GLUCOSE SERPL-MCNC: 157 MG/DL (ref 73–118)
POC ALT (SGPT): 30 U/L (ref 10–47)
POC AST (SGOT): 28 U/L (ref 11–38)
POC B-TYPE NATRIURETIC PEPTIDE: 5.2 PG/ML (ref 0–100)
POC CARDIAC TROPONIN I: 0 NG/ML
POC PTINR: 0.9 (ref 0.9–1.2)
POC PTWBT: 11.4 SEC (ref 9.7–14.3)
POC TCO2: 26 MMOL/L (ref 18–33)
POTASSIUM BLD-SCNC: 3.8 MMOL/L (ref 3.6–5.1)
PROTEIN, POC: 7.1 G/DL (ref 6.4–8.1)
SAMPLE: NORMAL
SAMPLE: NORMAL
SODIUM BLD-SCNC: 141 MMOL/L (ref 128–145)

## 2020-09-14 PROCEDURE — 85025 COMPLETE CBC W/AUTO DIFF WBC: CPT | Mod: ER

## 2020-09-14 PROCEDURE — 99285 EMERGENCY DEPT VISIT HI MDM: CPT | Mod: 25,ER

## 2020-09-14 PROCEDURE — 83880 ASSAY OF NATRIURETIC PEPTIDE: CPT | Mod: ER

## 2020-09-14 PROCEDURE — 93010 ELECTROCARDIOGRAM REPORT: CPT | Mod: ,,, | Performed by: INTERNAL MEDICINE

## 2020-09-14 PROCEDURE — 93010 EKG 12-LEAD: ICD-10-PCS | Mod: ,,, | Performed by: INTERNAL MEDICINE

## 2020-09-14 PROCEDURE — 84484 ASSAY OF TROPONIN QUANT: CPT | Mod: ER

## 2020-09-14 PROCEDURE — 93005 ELECTROCARDIOGRAM TRACING: CPT | Mod: S$GLB,,, | Performed by: INTERNAL MEDICINE

## 2020-09-14 PROCEDURE — 85610 PROTHROMBIN TIME: CPT | Mod: ER

## 2020-09-14 PROCEDURE — 99214 OFFICE O/P EST MOD 30 MIN: CPT | Mod: S$GLB,,, | Performed by: INTERNAL MEDICINE

## 2020-09-14 PROCEDURE — 99214 PR OFFICE/OUTPT VISIT, EST, LEVL IV, 30-39 MIN: ICD-10-PCS | Mod: S$GLB,,, | Performed by: INTERNAL MEDICINE

## 2020-09-14 PROCEDURE — 80053 COMPREHEN METABOLIC PANEL: CPT | Mod: ER

## 2020-09-14 PROCEDURE — 93005 ELECTROCARDIOGRAM TRACING: CPT | Mod: ER

## 2020-09-14 PROCEDURE — 93005 EKG 12-LEAD: ICD-10-PCS | Mod: S$GLB,,, | Performed by: INTERNAL MEDICINE

## 2020-09-14 RX ORDER — PANTOPRAZOLE SODIUM 20 MG/1
40 TABLET, DELAYED RELEASE ORAL DAILY
Qty: 60 TABLET | Refills: 0 | Status: SHIPPED | OUTPATIENT
Start: 2020-09-14 | End: 2020-10-14

## 2020-09-14 RX ORDER — AMLODIPINE BESYLATE 5 MG/1
TABLET ORAL
COMMUNITY
Start: 2020-08-27

## 2020-09-14 NOTE — PROGRESS NOTES
"Subjective:       Patient ID: Braeden Rowland is a 54 y.o. male.    Vitals:  height is 5' 11" (1.803 m) and weight is 97.5 kg (215 lb). His temperature is 97.2 °F (36.2 °C). His blood pressure is 135/78 and his pulse is 96. His respiration is 20 and oxygen saturation is 96%.     Chief Complaint: Shoulder Pain    54-year-old male with history of GERD, hyperlipidemia, hypertension presents to urgent care c/o chronic left shoulder pain.  That started worsening 3-4 days ago.  Patient states that he has established care at [facility] and has been taking Mobic and Voltaren for his arthritis but only when he is having the pain and it gives him temporary relief.  He reports pain that is sharp that radiates from the top of his shoulder down the lateral aspect of his left knee back and is consistent with his usual arthritis pain.  Pain is exacerbated with movement, especially lifting overhead.  Patient also complaines of having 1 episode of palpitations that started after he drank a cup of coffee this morning.  Patient states he has not had coffee in 6 months and thinks his body and reacted to it.  Episode resolved and has not occurred again, but he wanted to make sure his heart is okay. He has been referred to cardiology multiple times, but has not followed up. Patient also reports that he started taking amlodipine 3 weeks ago for his blood pressure. Pt denies recent trauma/illness/travel, fever, chills, loss of smell/taste, cough, SOB/PALENCIA, chest pain, leg pain/swelling, N/V/D, abdominal pain, back pain, neck pain, headache, vision changes, syncope, numbness or focal weakness.      Shoulder Pain   This is a new problem. Pertinent negatives include no fever, headaches, inability to bear weight, itching, joint locking, joint swelling, limited range of motion, numbness, stiffness, tingling or visual symptoms. The symptoms are aggravated by activity. He has tried OTC ointments and OTC pain meds for the symptoms. The " treatment provided significant relief. Family history does not include arthritis. There is no history of diabetes, Injuries to Extremity or migraines.       Constitution: Negative for chills, fatigue and fever.   HENT: Negative for congestion, sore throat and trouble swallowing.    Neck: Negative for neck pain and painful lymph nodes.   Cardiovascular: Positive for palpitations. Negative for chest pain, leg swelling and sob on exertion.   Eyes: Negative for photophobia, double vision and blurred vision.   Respiratory: Negative for chest tightness, cough, shortness of breath and wheezing.    Gastrointestinal: Negative for abdominal pain, nausea, vomiting and diarrhea.   Genitourinary: Negative for dysuria, frequency and urgency.   Musculoskeletal: Positive for pain and joint pain (left shoulder). Negative for joint swelling, abnormal ROM of joint, back pain, muscle cramps and muscle ache.   Skin: Negative for color change, pale and rash.   Allergic/Immunologic: Negative for seasonal allergies.   Neurological: Negative for dizziness, history of vertigo, light-headedness, passing out, headaches, numbness and tingling.   Hematologic/Lymphatic: Negative for swollen lymph nodes, easy bruising/bleeding and history of blood clots. Does not bruise/bleed easily.   Psychiatric/Behavioral: Negative for nervous/anxious, sleep disturbance and depression. The patient is not nervous/anxious.        Objective:      Physical Exam   Constitutional: He is oriented to person, place, and time. He appears well-developed. He is cooperative.  Non-toxic appearance. He does not appear ill. No distress.   HENT:   Head: Normocephalic and atraumatic.   Ears:   Right Ear: Hearing normal.   Left Ear: Hearing normal.   Eyes: Conjunctivae and lids are normal. No scleral icterus.   Neck: Trachea normal, normal range of motion, full passive range of motion without pain and phonation normal. Neck supple.   Cardiovascular: Normal rate, regular rhythm,  normal heart sounds and normal pulses.   No murmur heard.  Pulmonary/Chest: Effort normal and breath sounds normal. No respiratory distress. He has no wheezes. He has no rhonchi. He has no rales. He exhibits no tenderness.   Abdominal: Normal appearance. He exhibits no pulsatile midline mass.   Musculoskeletal: Normal range of motion.      Left shoulder: He exhibits bony tenderness and crepitus. He exhibits normal range of motion, no swelling, no effusion, no deformity, no spasm, normal pulse and normal strength.      Right lower leg: No edema.      Left lower leg: No edema.      Comments: Slight TTP over left AC joint and mild crepitus on passive/active ROM. Normal ROM. Strength and sensation intact.    Neurological: He is alert and oriented to person, place, and time. He exhibits normal muscle tone.   Skin: Skin is warm, dry, intact and not diaphoretic. Psychiatric: His speech is normal and behavior is normal. Mood, judgment and thought content normal.   Nursing note and vitals reviewed.        Assessment:       1. Palpitations    2. Primary osteoarthritis of left shoulder        Plan:         Palpitations  -     IN OFFICE EKG 12-LEAD (to [facility])  -     decrease caffeine/coffee intake to prevent reoccurrence of palpitations    Primary osteoarthritis of left shoulder         - continue taking Mobic and applying voltaren gel to shoulder, RICE therapy, heating  pad, physical therapy     EKG: NSR @ 71 bpm. Normal Axis. Nonspecific ST changes. Slight J point elevation in aVF. No acute ST elevation/depression. No significant changes from most recent EKG 8/22/20.    *Discussed results/diagnosis/plan with patient in clinic. Pt is to follow up with cardiologist in 1-3 days for reevaluation and outpatient stress echo + holter monitor to ensure there are no underlying arrhythmias or CAD. Educated extensively on CAD and importance of following up with cardiology ASAP. Cardio consult placed by previous ED provider. Answered all  "of patient's questions and concerns. Patient was given strict ED instructions. Patient verbally understood and agreed with treatment plan.    Patient Instructions       Heart Palpitations    Palpitations are the feeling that your heart is beating hard, fast, or irregular. Some describe it as "pounding" or "skipped beats." Palpitations may occur in someone with heart disease, but can also occur in a healthy person.  Heart-related causes:  · Arrhythmia (a change from the heart's normal rhythm)  · Heart valve disease  · Disease of the heart muscle  · Coronary artery disease  · High blood pressure  Non-heart-related causes:  · Certain medicines (such as asthma inhalers and decongestants)  · Some herbal supplements, energy drinks and pills, and weight loss pills  · Illegal stimulant drugs (such as cocaine, crank, methamphetamine, PCP, bath salts, ecstasy)  · Caffeine, alcohol, and tobacco  · Medical conditions such as thyroid disease, anemia, anxiety, and panic disorder  Sometimes the cause can't be found.  Home care  Follow these home care tips:  · Avoid excess caffeine, alcohol, tobacco, and any stimulant drugs.  · Tell your doctor about any prescription or over-the-counter or herbal medicines you take.  Follow-up care  · Follow up with your doctor, or as advised.  Call 911  This is the fastest and safest way to get to the emergency department. The paramedics can also begin treatment on the way to the hospital, if needed.  Don't wait until your symptoms are severe to call 911. These are reasons to call 911:  · Chest pain  · Shortness of breath  · Feeling lightheaded, faint, or dizzy  · Fainting or loss of consciousness  · Very irregular heartbeat  · Rapid heartbeat that makes you uncomfortable  · Slower than usual heart rate associated with symptoms  · Slower than usual heart rate  · Chest pain with weakness, dizziness, heavy sweating, nausea, or vomiting  · Extreme drowsiness or confusion  · Weakness of an arm or leg, " or on 1 side of the face  · Difficulty with speech or vision  When to seek medical advice  Get prompt medical attention if you have palpitations and any of the following:  · Weakness  · Dizziness  · Lightheadedness  · Fainting  Date Last Reviewed: 4/27/2016 © 2000-2017 [employer]. [address] [place] [id number]. All rights reserved. This information is not intended as a substitute for professional medical care. Always follow your healthcare professional's instructions.        Understanding Glenohumeral Osteoarthritis    A joint is a place where two bones meet. The glenohumeral joint is the main joint in the shoulder. It is a ball-and-socket joint. It is formed where the head or ball of the upper arm bone fits into the shallow socket on the shoulder blade. The upper arm bone is called the humerus. The socket is called the glenoid. This is how the joint gets its name. When the glenohumeral joint is healthy, it helps you rotate and move your arm freely without any pain.  With glenohumeral osteoarthritis, the parts of the joint wear down. This can cause pain, stiffness, and limited movement. Glenohumeral osteoarthritis is a long-term condition. But treatment can help manage symptoms, increase movement, and improve function.  How glenohumeral osteoarthritis occurs  All joints contain a smooth tissue called cartilage. Cartilage cushions the ends of bones. This helps them glide smoothly against each other. Glenohumeral osteoarthritis occurs when cartilage in the glenohumeral joint begins to break down. The ball and socket bones may then become exposed and rub together. The bones may become rough and pitted and may begin to wear away. This prevents smooth movement of the joint.  Causes of glenohumeral osteoarthritis  In most cases, the condition develops because of damage from a shoulder injury, such as a dislocated or broken shoulder. Normal wear and tear of the joint from aging can also cause  osteoarthritis.  Symptoms of glenohumeral osteoarthritis  Symptoms tend to develop slowly over months to years. Shoulder pain is common. The pain is often worse with activity, and may get better with rest. Over time, the pain may worsen, and may even occur at night.  Stiffness in the shoulder is also common. It may be hard to move or use the arm and shoulder as you normally would. This can make even simple tasks difficult, such as reaching for an item on a shelf or getting dressed.  Treating glenohumeral osteoarthritis  Treatment may include:  · Resting the shoulder. This involves limiting certain movements, such as reaching, lifting, pushing, or pulling. These can cause further wear and tear of the joint and make symptoms worse.  · Cold or heat packs. Cold packs can help reduce pain and swelling. Heat packs do not help with swelling. But they may help relieve pain and stiffness, especially before physical therapy or activity.  · Pain medicines. These help relieve pain and swelling. Medicines may be prescribed or bought over the counter.  · Injections of medicine into the joint. These help relieve symptoms for a time.  · Physical therapy and exercises.  These help improve strength and range of motion in the joint. This may reduce shoulder stiffness and improve function.  · Certain assistive devices. These are tools that can be used to make activities of daily life easier. For instance, a grasper can help you reach and grab items.  · Alternative treatments. These include options, such as acupuncture or massage. They may help relieve pain and stiffness in some cases.  If other treatments dont do enough to relieve symptoms, you may need surgery. During surgery, the damaged joint is removed. It is then replaced with an artificial joint. This can help relieve symptoms and improve function to near normal.     When to call your healthcare provider  Call your healthcare provider right away if you have any of  these:  · Fever of 100.4°F (38°C) or higher, or as directed  · Symptoms that dont get better with treatment, or get worse  · New symptoms   Date Last Reviewed: 3/10/2016  © 2296-6934 [employer]. [address] [place] [address], [place] [address]. All rights reserved. This information is not intended as a substitute for professional medical care. Always follow your healthcare professional's instructions.    - Rest, Ice, Compression, Elevation  - Drink plenty of fluids and decrease coffee/caffeine intake to prevent onset of palpitations  - Take Tylenol every 6 hours as needed for pain/fever/chills/body aches (Do NOT exceed taking 4000mg/day of Tylenol)  - Continue taking meloxicam for chronic shoulder pain due to arthritis. Also take an over the counter antacid with it (Prilosec, Nexium, Pepcid) to protect your stomach. Do not take these medications on an empty stomach or in combination with other NSAIDs as this will increase your risk of bleeding significantly.   - Apply Voltaren to area as needed for pain/swelling  - Follow up with cardiologist in 1-3 days for reevaluation and outpatient stress echo + holter monitor to ensure there are no arrhythmias or underlying blockages in the arteries of your heart.  A cardiology referral was placed in our system for you, so they should be contacting you shortly to schedule an a ppointment for you. If you don't hear from them, you can call [phone] to schedule an appointment with the appropriate provider.    - *Please go to the Emergency Department if your symptoms worsen including but not limited to worsening palpations, chest pain, shortness of breath, passing out, numbness or weakness in arms or legs, etc.

## 2020-09-14 NOTE — PATIENT INSTRUCTIONS
"  Heart Palpitations    Palpitations are the feeling that your heart is beating hard, fast, or irregular. Some describe it as "pounding" or "skipped beats." Palpitations may occur in someone with heart disease, but can also occur in a healthy person.  Heart-related causes:  · Arrhythmia (a change from the heart's normal rhythm)  · Heart valve disease  · Disease of the heart muscle  · Coronary artery disease  · High blood pressure  Non-heart-related causes:  · Certain medicines (such as asthma inhalers and decongestants)  · Some herbal supplements, energy drinks and pills, and weight loss pills  · Illegal stimulant drugs (such as cocaine, crank, methamphetamine, PCP, bath salts, ecstasy)  · Caffeine, alcohol, and tobacco  · Medical conditions such as thyroid disease, anemia, anxiety, and panic disorder  Sometimes the cause can't be found.  Home care  Follow these home care tips:  · Avoid excess caffeine, alcohol, tobacco, and any stimulant drugs.  · Tell your doctor about any prescription or over-the-counter or herbal medicines you take.  Follow-up care  · Follow up with your doctor, or as advised.  Call 911  This is the fastest and safest way to get to the emergency department. The paramedics can also begin treatment on the way to the hospital, if needed.  Don't wait until your symptoms are severe to call 911. These are reasons to call 911:  · Chest pain  · Shortness of breath  · Feeling lightheaded, faint, or dizzy  · Fainting or loss of consciousness  · Very irregular heartbeat  · Rapid heartbeat that makes you uncomfortable  · Slower than usual heart rate associated with symptoms  · Slower than usual heart rate  · Chest pain with weakness, dizziness, heavy sweating, nausea, or vomiting  · Extreme drowsiness or confusion  · Weakness of an arm or leg, or on 1 side of the face  · Difficulty with speech or vision  When to seek medical advice  Get prompt medical attention if you have palpitations and any of the " following:  · Weakness  · Dizziness  · Lightheadedness  · Fainting  Date Last Reviewed: 4/27/2016  © 2637-2475 [email or web address]. [address] [place] [address], [place] [address]. All rights reserved. This information is not intended as a substitute for professional medical care. Always follow your healthcare professional's instructions.        Understanding Glenohumeral Osteoarthritis    A joint is a place where two bones meet. The glenohumeral joint is the main joint in the shoulder. It is a ball-and-socket joint. It is formed where the head or ball of the upper arm bone fits into the shallow socket on the shoulder blade. The upper arm bone is called the humerus. The socket is called the glenoid. This is how the joint gets its name. When the glenohumeral joint is healthy, it helps you rotate and move your arm freely without any pain.  With glenohumeral osteoarthritis, the parts of the joint wear down. This can cause pain, stiffness, and limited movement. Glenohumeral osteoarthritis is a long-term condition. But treatment can help manage symptoms, increase movement, and improve function.  How glenohumeral osteoarthritis occurs  All joints contain a smooth tissue called cartilage. Cartilage cushions the ends of bones. This helps them glide smoothly against each other. Glenohumeral osteoarthritis occurs when cartilage in the glenohumeral joint begins to break down. The ball and socket bones may then become exposed and rub together. The bones may become rough and pitted and may begin to wear away. This prevents smooth movement of the joint.  Causes of glenohumeral osteoarthritis  In most cases, the condition develops because of damage from a shoulder injury, such as a dislocated or broken shoulder. Normal wear and tear of the joint from aging can also cause osteoarthritis.  Symptoms of glenohumeral osteoarthritis  Symptoms tend to develop slowly over months to years. Shoulder pain is common. The pain is often worse  with activity, and may get better with rest. Over time, the pain may worsen, and may even occur at night.  Stiffness in the shoulder is also common. It may be hard to move or use the arm and shoulder as you normally would. This can make even simple tasks difficult, such as reaching for an item on a shelf or getting dressed.  Treating glenohumeral osteoarthritis  Treatment may include:  · Resting the shoulder. This involves limiting certain movements, such as reaching, lifting, pushing, or pulling. These can cause further wear and tear of the joint and make symptoms worse.  · Cold or heat packs. Cold packs can help reduce pain and swelling. Heat packs do not help with swelling. But they may help relieve pain and stiffness, especially before physical therapy or activity.  · Pain medicines. These help relieve pain and swelling. Medicines may be prescribed or bought over the counter.  · Injections of medicine into the joint. These help relieve symptoms for a time.  · Physical therapy and exercises.  These help improve strength and range of motion in the joint. This may reduce shoulder stiffness and improve function.  · Certain assistive devices. These are tools that can be used to make activities of daily life easier. For instance, a grasper can help you reach and grab items.  · Alternative treatments. These include options, such as acupuncture or massage. They may help relieve pain and stiffness in some cases.  If other treatments dont do enough to relieve symptoms, you may need surgery. During surgery, the damaged joint is removed. It is then replaced with an artificial joint. This can help relieve symptoms and improve function to near normal.     When to call your healthcare provider  Call your healthcare provider right away if you have any of these:  · Fever of 100.4°F (38°C) or higher, or as directed  · Symptoms that dont get better with treatment, or get worse  · New symptoms   Date Last Reviewed: 3/10/2016  ©  2971-2350 [employer]. [address] [place] [address], [place] [address]. All rights reserved. This information is not intended as a substitute for professional medical care. Always follow your healthcare professional's instructions.    - Rest, Ice, Compression, Elevation  - Drink plenty of fluids and decrease coffee/caffeine intake to prevent onset of palpitations  - Take Tylenol every 6 hours as needed for pain/fever/chills/body aches (Do NOT exceed taking 4000mg/day of Tylenol)  - Continue taking meloxicam for chronic shoulder pain due to arthritis. Also take an over the counter antacid with it (Prilosec, Nexium, Pepcid) to protect your stomach. Do not take these medications on an empty stomach or in combination with other NSAIDs as this will increase your risk of bleeding significantly.   - Apply Voltaren to area as needed for pain/swelling  - Follow up with cardiologist in 1-3 days for reevaluation and outpatient stress echo + holter monitor to ensure there are no arrhythmias or underlying blockages in the arteries of your heart.  A cardiology referral was placed in our system for you, so they should be contacting you shortly to schedule an a ppointment for you. If you don't hear from them, you can call [phone] to schedule an appointment with the appropriate provider.    - *Please go to the Emergency Department if your symptoms worsen including but not limited to worsening palpations, chest pain, shortness of breath, passing out, numbness or weakness in arms or legs, etc.

## 2020-09-14 NOTE — ED PROVIDER NOTES
Encounter Date: 9/14/2020    SCRIBE #1 NOTE: I, Kandi Juarez Caterina, am scribing for, and in the presence of,  THANH Steiner. I have scribed the following portions of the note - Other sections scribed: HPI, ROS.       History     Chief Complaint   Patient presents with    Palpitations     Reports palpitations started a couple minutes ago after eating canes. Denies chest pain.      Braeden Rowland is a 54 y.o. male with a history of HTN, GERD and heart palpitations who presents to the ED for evaluation of heart palpitations x 2 hours (currently resolved). Also reports mild chest pain, generalized weakness and left shoulder pain (resolved). States he has had several similar episodes in the past and believes the current one began after drinking coffee today (which he has not had in a while). States he felt his heart rate returning to normal while he was sitting in the waiting room. Also states he went to urgent care earlier today for palpitations and went home after they performed an EKG and his symptoms resolved. States the symptoms began again after he ate canes and drank water. Denies SOB, sweating, nausea, and vomiting. States he spoke to the cardiologist on the phone today but has not received any in-person treatment. Reports he does not typically drink but drank 4-5 beers three days in a row at the end of last week. Notes his wife thinks he is not drinking enough water.    The history is provided by the patient. No  was used.     Review of patient's allergies indicates:  No Known Allergies  Past Medical History:   Diagnosis Date    Chest pain 10/2019    Cigarette smoker     GERD (gastroesophageal reflux disease)     Hyperlipidemia     used to take medication     Past Surgical History:   Procedure Laterality Date    NO PAST SURGERIES  03/06/2020     No family history on file.  Social History     Tobacco Use    Smoking status: Former Smoker     Packs/day: 0.50     Types: Cigarettes  "   Smokeless tobacco: Never Used   Substance Use Topics    Alcohol use: Yes     Alcohol/week: 21.0 standard drinks     Types: 21 Cans of beer per week     Comment: 3/6/20: "about 3, everyday after I get off work"    Drug use: No     Review of Systems   Constitutional: Negative for diaphoresis and fever.   Respiratory: Negative for shortness of breath.    Cardiovascular: Positive for chest pain (resolved) and palpitations (resolved).   Gastrointestinal: Negative for nausea and vomiting.   Musculoskeletal: Positive for arthralgias (shoulder (resolved)).   Neurological: Positive for weakness (resolved).   All other systems reviewed and are negative.      Physical Exam     Initial Vitals [09/14/20 1615]   BP Pulse Resp Temp SpO2   (!) 160/94 97 20 97.5 °F (36.4 °C) 99 %      MAP       --         Physical Exam    Nursing note and vitals reviewed.  Constitutional: He appears well-developed and well-nourished. He is not diaphoretic. No distress.   HENT:   Head: Normocephalic and atraumatic.   Nose: Nose normal.   Eyes: Conjunctivae and EOM are normal. Pupils are equal, round, and reactive to light. Right eye exhibits no discharge. Left eye exhibits no discharge.   Neck: Normal range of motion. No tracheal deviation present. No JVD present.   Cardiovascular: Normal rate, regular rhythm and normal heart sounds. Exam reveals no friction rub.    No murmur heard.  Pulmonary/Chest: Breath sounds normal. No stridor. No respiratory distress. He has no wheezes. He has no rhonchi. He has no rales. He exhibits no tenderness.   Abdominal: Soft. He exhibits no distension. There is no abdominal tenderness. There is no guarding.   Musculoskeletal: Normal range of motion.      Comments: No lower extremity swelling or tenderness.  Full ROM of upper extremities and equal  strength.  Radial pulses 2+ and equal.  Sensation intact and equal.  No tenderness to the bilateral shoulders.  No skin abnormalities to shoulders.   Neurological: " He is alert and oriented to person, place, and time.   Skin: Skin is warm and dry. No rash and no abscess noted. No erythema. No pallor.         ED Course   Procedures  Labs Reviewed   POCT CMP - Abnormal; Notable for the following components:       Result Value    POC Glucose 157 (*)     All other components within normal limits   TROPONIN ISTAT   POCT CBC   POCT CMP   POCT PROTIME-INR   POCT TROPONIN   POCT B-TYPE NATRIURETIC PEPTIDE (BNP)   ISTAT PROCEDURE   POCT B-TYPE NATRIURETIC PEPTIDE (BNP)         EKG Readings: (Independently Interpreted)   No STEMI. Rate of 89. Normal Sinus Rhythm. Normal EKG. QTC normal at 433. When compared to prior EKG dated 08/22/20, rate increased by 19 bpm.        Imaging Results          X-Ray Chest AP Portable (Final result)  Result time 09/14/20 16:39:56    Final result by Hans Varma MD (09/14/20 16:39:56)                 Impression:      No acute chest disease identified.  No detrimental change noted when compared to prior examination dated 08/01/2020.      Electronically signed by: Hans Varma MD  Date:    09/14/2020  Time:    16:39             Narrative:    EXAMINATION:  XR CHEST AP PORTABLE    CLINICAL HISTORY:  palpitations;    TECHNIQUE:  Single frontal view of the chest was performed.    COMPARISON:  08/01/2020.    FINDINGS:  The heart is not enlarged.  Superior mediastinal structures are unremarkable.  Pulmonary vasculature is within normal limits.  The lungs are free of focal consolidations.  There is no evidence for pneumothorax or large pleural effusions.  Bony structures are grossly intact.                                 Medical Decision Making:   History:   Old Medical Records: I decided to obtain old medical records.  Independently Interpreted Test(s):   I have ordered and independently interpreted X-rays - see prior notes.  Clinical Tests:   Lab Tests: Ordered and Reviewed  Radiological Study: Ordered and Reviewed  ED Management:  Patient remains completely  asymptomatic and well appearing with stable vitals while in the emergency department.  States he has had a history of similar episodes of palpitations that he has attributed to eating greasy foods.  Notes a history of GERD but is not compliant with GERD medication because he ran out.  He is currently followed by Cardiology but has not had a Holter monitor, echocardiogram, or stress test performed; I recommend a follow-up with Cardiology for these tests at a minimum.  Heart score of 3 today.  Wells PE score of 0.  I doubt acute cholecystitis.  No pneumonia, pneumothorax, or widening mediastinum a chest x-ray.  Advising follow-up with PCP and cardiology. Strict return precautions discussed with patient who is agreeable to the plan.    Additional MDM:   Heart Score:    History:          Slightly suspicious.  ECG:             Normal  Age:               45-65 years  Risk factors: 1-2 risk factors  Troponin:       1-2x normal limit  Final Score: 3             Scribe Attestation:   Scribe #1: I performed the above scribed service and the documentation accurately describes the services I performed. I attest to the accuracy of the note.    Scribe attestation: I, John Gomez, personally performed the services described in this documentation. All medical record entries made by the scribe were at my direction and in my presence.  I have reviewed the chart and agree that the record reflects my personal performance and is accurate and complete                   Clinical Impression:     1. Palpitations    2. History of palpitations    3. History of gastroesophageal reflux (GERD)                ED Disposition Condition    Discharge Stable        ED Prescriptions     Medication Sig Dispense Start Date End Date Auth. Provider    pantoprazole (PROTONIX) 20 MG tablet Take 2 tablets (40 mg total) by mouth once daily. 60 tablet 9/14/2020 10/14/2020 John Gomez PA-C        Follow-up Information     Follow up With Specialties  Details Why Contact Info    [facility]  Schedule an appointment as soon as possible for a visit in 1 day For reevaluation [address]  [place] [address]  [phone]      [facility] Emergency Department Emergency Medicine Go to  If symptoms worsen [address] [facility] [id number] [phone]                                       John Gomez PA-C  09/14/20 1990

## 2020-09-16 ENCOUNTER — HOSPITAL ENCOUNTER (EMERGENCY)
Facility: HOSPITAL | Age: 55
Discharge: HOME | End: 2020-09-16
Attending: EMERGENCY MEDICINE
Payer: MEDICAID

## 2020-09-16 VITALS
WEIGHT: 210 LBS | TEMPERATURE: 98 F | HEIGHT: 71 IN | DIASTOLIC BLOOD PRESSURE: 77 MMHG | RESPIRATION RATE: 21 BRPM | HEART RATE: 67 BPM | BODY MASS INDEX: 29.4 KG/M2 | OXYGEN SATURATION: 99 % | SYSTOLIC BLOOD PRESSURE: 126 MMHG

## 2020-09-16 DIAGNOSIS — R07.9 LEFT-SIDED CHEST PAIN: ICD-10-CM

## 2020-09-16 DIAGNOSIS — G89.29 CHRONIC LEFT SHOULDER PAIN: ICD-10-CM

## 2020-09-16 DIAGNOSIS — R07.89 CHEST WALL PAIN: Primary | ICD-10-CM

## 2020-09-16 DIAGNOSIS — R07.9 CHEST PAIN: ICD-10-CM

## 2020-09-16 DIAGNOSIS — M25.512 CHRONIC LEFT SHOULDER PAIN: ICD-10-CM

## 2020-09-16 LAB
ALBUMIN SERPL BCP-MCNC: 4.1 G/DL (ref 3.5–5.2)
ALP SERPL-CCNC: 66 U/L (ref 55–135)
ALT SERPL W/O P-5'-P-CCNC: 29 U/L (ref 10–44)
ANION GAP SERPL CALC-SCNC: 8 MMOL/L (ref 8–16)
AST SERPL-CCNC: 20 U/L (ref 10–40)
BASOPHILS # BLD AUTO: 0.05 K/UL (ref 0–0.2)
BASOPHILS NFR BLD: 0.8 % (ref 0–1.9)
BILIRUB SERPL-MCNC: 0.5 MG/DL (ref 0.1–1)
BNP SERPL-MCNC: <10 PG/ML (ref 0–99)
BUN SERPL-MCNC: 10 MG/DL (ref 6–20)
CALCIUM SERPL-MCNC: 9.3 MG/DL (ref 8.7–10.5)
CHLORIDE SERPL-SCNC: 102 MMOL/L (ref 95–110)
CO2 SERPL-SCNC: 28 MMOL/L (ref 23–29)
CREAT SERPL-MCNC: 1 MG/DL (ref 0.5–1.4)
D DIMER PPP IA.FEU-MCNC: 0.24 MG/L FEU
DIFFERENTIAL METHOD: ABNORMAL
EOSINOPHIL # BLD AUTO: 0.1 K/UL (ref 0–0.5)
EOSINOPHIL NFR BLD: 2.3 % (ref 0–8)
ERYTHROCYTE [DISTWIDTH] IN BLOOD BY AUTOMATED COUNT: 12.2 % (ref 11.5–14.5)
EST. GFR  (AFRICAN AMERICAN): >60 ML/MIN/1.73 M^2
EST. GFR  (NON AFRICAN AMERICAN): >60 ML/MIN/1.73 M^2
GLUCOSE SERPL-MCNC: 98 MG/DL (ref 70–110)
HCT VFR BLD AUTO: 45.7 % (ref 40–54)
HGB BLD-MCNC: 14.5 G/DL (ref 14–18)
IMM GRANULOCYTES # BLD AUTO: 0.01 K/UL (ref 0–0.04)
IMM GRANULOCYTES NFR BLD AUTO: 0.2 % (ref 0–0.5)
LYMPHOCYTES # BLD AUTO: 1.5 K/UL (ref 1–4.8)
LYMPHOCYTES NFR BLD: 24 % (ref 18–48)
MCH RBC QN AUTO: 29.4 PG (ref 27–31)
MCHC RBC AUTO-ENTMCNC: 31.7 G/DL (ref 32–36)
MCV RBC AUTO: 93 FL (ref 82–98)
MONOCYTES # BLD AUTO: 0.6 K/UL (ref 0.3–1)
MONOCYTES NFR BLD: 9.7 % (ref 4–15)
NEUTROPHILS # BLD AUTO: 3.8 K/UL (ref 1.8–7.7)
NEUTROPHILS NFR BLD: 63 % (ref 38–73)
NRBC BLD-RTO: 0 /100 WBC
PLATELET # BLD AUTO: 252 K/UL (ref 150–350)
PMV BLD AUTO: 10.5 FL (ref 9.2–12.9)
POTASSIUM SERPL-SCNC: 4.1 MMOL/L (ref 3.5–5.1)
PROT SERPL-MCNC: 7.5 G/DL (ref 6–8.4)
RBC # BLD AUTO: 4.94 M/UL (ref 4.6–6.2)
SODIUM SERPL-SCNC: 138 MMOL/L (ref 136–145)
TROPONIN I SERPL DL<=0.01 NG/ML-MCNC: <0.006 NG/ML (ref 0–0.03)
WBC # BLD AUTO: 6.08 K/UL (ref 3.9–12.7)

## 2020-09-16 PROCEDURE — 85379 FIBRIN DEGRADATION QUANT: CPT

## 2020-09-16 PROCEDURE — 84484 ASSAY OF TROPONIN QUANT: CPT

## 2020-09-16 PROCEDURE — 63600175 PHARM REV CODE 636 W HCPCS: Performed by: NURSE PRACTITIONER

## 2020-09-16 PROCEDURE — 96375 TX/PRO/DX INJ NEW DRUG ADDON: CPT

## 2020-09-16 PROCEDURE — 93005 ELECTROCARDIOGRAM TRACING: CPT

## 2020-09-16 PROCEDURE — 83880 ASSAY OF NATRIURETIC PEPTIDE: CPT

## 2020-09-16 PROCEDURE — 93010 ELECTROCARDIOGRAM REPORT: CPT | Mod: ,,, | Performed by: INTERNAL MEDICINE

## 2020-09-16 PROCEDURE — 85025 COMPLETE CBC W/AUTO DIFF WBC: CPT

## 2020-09-16 PROCEDURE — 99285 EMERGENCY DEPT VISIT HI MDM: CPT | Mod: 25

## 2020-09-16 PROCEDURE — 96374 THER/PROPH/DIAG INJ IV PUSH: CPT

## 2020-09-16 PROCEDURE — 80053 COMPREHEN METABOLIC PANEL: CPT

## 2020-09-16 PROCEDURE — 93010 EKG 12-LEAD: ICD-10-PCS | Mod: ,,, | Performed by: INTERNAL MEDICINE

## 2020-09-16 RX ORDER — KETOROLAC TROMETHAMINE 30 MG/ML
15 INJECTION, SOLUTION INTRAMUSCULAR; INTRAVENOUS
Status: COMPLETED | OUTPATIENT
Start: 2020-09-16 | End: 2020-09-16

## 2020-09-16 RX ORDER — DEXAMETHASONE SODIUM PHOSPHATE 4 MG/ML
8 INJECTION, SOLUTION INTRA-ARTICULAR; INTRALESIONAL; INTRAMUSCULAR; INTRAVENOUS; SOFT TISSUE
Status: COMPLETED | OUTPATIENT
Start: 2020-09-16 | End: 2020-09-16

## 2020-09-16 RX ORDER — TIZANIDINE 4 MG/1
4 TABLET ORAL EVERY 8 HOURS PRN
Qty: 15 TABLET | Refills: 0 | Status: SHIPPED | OUTPATIENT
Start: 2020-09-16 | End: 2021-09-15

## 2020-09-16 RX ADMIN — KETOROLAC TROMETHAMINE 15 MG: 30 INJECTION, SOLUTION INTRAMUSCULAR at 08:09

## 2020-09-16 RX ADMIN — DEXAMETHASONE SODIUM PHOSPHATE 8 MG: 4 INJECTION, SOLUTION INTRAMUSCULAR; INTRAVENOUS at 08:09

## 2020-09-16 NOTE — DISCHARGE INSTRUCTIONS
Take all medications as prescribed.  Do not drive or drink alcohol when taking muscle relaxers because they will make you drowsy.    Follow-up with your regular doctor for further treatment and testing.  Return emergency department immediately for any new or worsening symptoms.    Thank you for coming to our Emergency Department today. It is important to remember that some problems are difficult to diagnose and may not be found during your first visit. Be sure to follow up with your primary care doctor.  If you do not have one, you may contact the one listed on your discharge paperwork or you may also call the [facility] Appointment Desk at [phone] to schedule an appointment with one.     Return to the ER with any questions/concerns, new/concerning symptoms, worsening or failure to improve. Do not drive or make any important decisions for 24 hours if you have received any pain medications, sedatives or mood altering drugs during your ER visit.

## 2020-09-16 NOTE — ED NOTES
In POV. A/o X4. No S/S of distress. Ambulatory to room. VSS.     C/O L shoulder/chest pain intermittently for 3 days. Pain worse with movement to L arm. Denies SOB/N/V.     Hooked to monitor. VSS. Will continue to closely monitor.

## 2020-09-16 NOTE — ED PROVIDER NOTES
"Encounter Date: 9/16/2020    SCRIBE #1 NOTE: I, Iker Saini, am scribing for, and in the presence of,  Solitario Bernard NP. I have scribed the following portions of the note - Other sections scribed: KIMBERLY BRANHAM.       History     Chief Complaint   Patient presents with    Chest Pain     Pt c/o intermittent upper left anterior chest pain that radiates into left shoulder and arm since last Monday. Pt denies SOB, nausea or vomiting.     54 y.o. male with past medical history of Hypertension and GERD presenting with one week history of intermittent 5/10 left anterior chest pain that radiates to left shoulder. Pain has a cramping sensation. Pain is worsened with movement/ROM of the left shoulder. Patient reports being evaluated at another ER 2 days ago for similar complaints, had blood work and chest x-ray that came back normal. He has been taking Meloxicam which he states is not helping. He denies shortness of breath, cough, nausea, vomiting, fever, chills, sore throat, dysuria, or any further complaints. Patient has history of arthritis on left shoulder and states pain is similar.    The history is provided by the patient. No  was used.     Review of patient's allergies indicates:  No Known Allergies  Past Medical History:   Diagnosis Date    Chest pain 10/2019    Cigarette smoker     GERD (gastroesophageal reflux disease)     Hyperlipidemia     used to take medication     Past Surgical History:   Procedure Laterality Date    NO PAST SURGERIES  03/06/2020     History reviewed. No pertinent family history.  Social History     Tobacco Use    Smoking status: Former Smoker     Packs/day: 0.50     Types: Cigarettes    Smokeless tobacco: Never Used   Substance Use Topics    Alcohol use: Yes     Alcohol/week: 21.0 standard drinks     Types: 21 Cans of beer per week     Comment: 3/6/20: "about 3, everyday after I get off work"    Drug use: No     Review of Systems   Constitutional: Negative for chills " and fever.   HENT: Negative for congestion and sore throat.    Eyes: Negative for pain, redness and visual disturbance.   Respiratory: Negative for cough and shortness of breath.    Cardiovascular: Positive for chest pain (left-sided).   Gastrointestinal: Negative for abdominal pain, nausea and vomiting.   Genitourinary: Negative for discharge, dysuria, flank pain and hematuria.   Musculoskeletal: Positive for arthralgias (left shoulder). Negative for back pain and neck pain.   Skin: Negative for rash.   Neurological: Negative for dizziness, seizures, syncope, light-headedness and headaches.   Psychiatric/Behavioral: Negative for confusion.       Physical Exam     Initial Vitals [09/16/20 0610]   BP Pulse Resp Temp SpO2   137/77 72 16 98 °F (36.7 °C) 99 %      MAP       --         Physical Exam    Nursing note and vitals reviewed.  Constitutional: Vital signs are normal. He appears well-developed and well-nourished. He is not diaphoretic. He is active and cooperative.  Non-toxic appearance. He does not have a sickly appearance. He does not appear ill. No distress.   HENT:   Head: Normocephalic and atraumatic.   Right Ear: External ear normal.   Left Ear: External ear normal.   Nose: Nose normal.   Eyes: Conjunctivae and EOM are normal. Pupils are equal, round, and reactive to light. Right eye exhibits no discharge. Left eye exhibits no discharge.   Neck: Normal range of motion. Neck supple. No tracheal deviation present.   Cardiovascular: Normal rate.   Pulmonary/Chest: Effort normal and breath sounds normal. No accessory muscle usage or stridor. No tachypnea. No respiratory distress.   Abdominal: Soft. Bowel sounds are normal. He exhibits no distension and no mass. There is no abdominal tenderness. There is no rebound and no guarding.   Musculoskeletal: Normal range of motion.      Left shoulder: He exhibits tenderness and pain. He exhibits no bony tenderness.      Comments: Mild tenderness to palpation of the left  superior shoulder and the left anterior chest wall overlying the pectoral muscle.  Pain is exacerbated with range of motion of the left shoulder, however active range of motion is fully intact.  Radial pulses are normal and equal bilaterally.  Strength is equal and normal in bilateral upper extremities.  No paresthesias.  Compartments are soft.  No neck pain or tenderness.  No erythema, warmth, induration, rash, lesions.   Neurological: He is alert and oriented to person, place, and time. He has normal strength and normal reflexes. No cranial nerve deficit.   Skin: Skin is warm and dry.   Psychiatric: He has a normal mood and affect. His behavior is normal. Judgment and thought content normal.         ED Course   Procedures  Labs Reviewed   CBC W/ AUTO DIFFERENTIAL - Abnormal; Notable for the following components:       Result Value    Mean Corpuscular Hemoglobin Conc 31.7 (*)     All other components within normal limits   COMPREHENSIVE METABOLIC PANEL   TROPONIN I   B-TYPE NATRIURETIC PEPTIDE   D DIMER, QUANTITATIVE     EKG Readings: (Independently Interpreted)   Initial Reading: No STEMI. Previous EKG: Compared with most recent EKG Previous EKG Date: 9/14/2020. Rhythm: Normal Sinus Rhythm. Heart Rate: 71. Ectopy: No Ectopy. Conduction: Normal. ST Segments: Normal ST Segments. T Waves: Normal. Clinical Impression: Normal Sinus Rhythm       Imaging Results          X-Ray Chest AP Portable (Final result)  Result time 09/16/20 07:11:53    Final result by Porfirio Bruce MD (09/16/20 07:11:53)                 Impression:      No acute abnormality.      Electronically signed by: Porfirio Bruce MD  Date:    09/16/2020  Time:    07:11             Narrative:    EXAMINATION:  XR CHEST AP PORTABLE    CLINICAL HISTORY:  . Chest pain, unspecified    TECHNIQUE:  Single frontal portable view of the chest was performed.    COMPARISON:  09/14/2020    FINDINGS:  Support devices: None    The lungs are clear, with normal appearance  of pulmonary vasculature and no pleural effusion or pneumothorax.    The cardiac silhouette is normal in size. The hilar and mediastinal contours are unremarkable.    Bones are intact.                                 Medical Decision Making:   History:   Old Medical Records: I decided to obtain old medical records.  Differential Diagnosis:   Musculoskeletal pain, NSTEMI, PE, pneumothorax, pneumonia, others  Clinical Tests:   Lab Tests: Ordered and Reviewed  Radiological Study: Ordered and Reviewed  Medical Tests: Ordered and Reviewed  ED Management:  HPI and physical exam as above.    Patient presenting for evaluation of left-sided chest pain and left shoulder pain.  Pain is reproducible and is worse with range of motion of the left shoulder.  Pain is described as a cramping.  Patient reports that it feels like the pain is in his muscles.  He has had similar problems in the past and has a history of chronic left shoulder pain which he attributes to arthritis.  He had a negative cardiac workup ath the [facility] for the same pain he is experiencing today 2 days ago.  States that Mobic is not helping to improve his symptoms.  EKG is normal sinus rhythm without evidence of ischemia, ectopy, or malignant arrhythmia.  CBC, CMP, troponin, BNP, and D-dimer are within normal limits.  Chest x-ray is unremarkable.  Given the above there is no evidence of emergent pathology at this time.  Will treat with NSAIDs and muscle relaxers. Advised patient to follow up with his PCP for re-evaluation and further management.  ED return precautions given. All questions regarding diagnosis and plan were answered to the patient's fullest possible satisfaction. Patient expressed understanding of diagnosis, discharge instructions, and return precautions.            Patient note was created using CRMnext voice dictation software.  Any errors in syntax or information may not have been identified and edited prior to  signing this note.              Scribe Attestation:   Scribe #1: I performed the above scribed service and the documentation accurately describes the services I performed. I attest to the accuracy of the note.                      Clinical Impression:       ICD-10-CM ICD-9-CM   1. Chest wall pain  R07.89 786.52   2. Chest pain  R07.9 786.50   3. Left-sided chest pain  R07.9 786.50   4. Chronic left shoulder pain  M25.512 719.41    G89.29 338.29         Disposition:   Disposition: Discharged  Condition: Stable     ED Disposition Condition    Discharge Stable        ED Prescriptions     Medication Sig Dispense Start Date End Date Auth. Provider    tiZANidine (ZANAFLEX) 4 MG tablet Take 1 tablet (4 mg total) by mouth every 8 (eight) hours as needed (Muscle Spasms). 15 tablet 9/16/2020  Solitario Bernard NP        Follow-up Information     Follow up With Specialties Details Why Contact Info    [facility]  Schedule an appointment as soon as possible for a visit in 1 week For further evaluation [address] [place] [address]  [phone]      [facility] Emergency Medicine Go to  If symptoms worsen, As needed [address] [facility] [address] [phone]                      I, Solitario Bernard NP, personally performed the services described in this documentation. All medical record entries made by the scribe were at my direction and in my presence. I have reviewed the chart and agree that the record reflects my personal performance and is accurate and complete.                   Solitario Bernard NP  09/16/20 0928

## 2020-10-08 ENCOUNTER — OFFICE VISIT (OUTPATIENT)
Dept: URGENT CARE | Facility: CLINIC | Age: 55
End: 2020-10-08
Payer: MEDICAID

## 2020-10-08 ENCOUNTER — OFFICE VISIT (OUTPATIENT)
Dept: CARDIOLOGY | Facility: CLINIC | Age: 55
End: 2020-10-08
Payer: MEDICAID

## 2020-10-08 VITALS
HEIGHT: 71 IN | HEART RATE: 86 BPM | BODY MASS INDEX: 31.22 KG/M2 | SYSTOLIC BLOOD PRESSURE: 142 MMHG | DIASTOLIC BLOOD PRESSURE: 84 MMHG | OXYGEN SATURATION: 96 % | WEIGHT: 223 LBS | TEMPERATURE: 99 F

## 2020-10-08 VITALS
DIASTOLIC BLOOD PRESSURE: 72 MMHG | OXYGEN SATURATION: 98 % | WEIGHT: 223.31 LBS | HEART RATE: 71 BPM | BODY MASS INDEX: 31.26 KG/M2 | RESPIRATION RATE: 15 BRPM | HEIGHT: 71 IN | SYSTOLIC BLOOD PRESSURE: 116 MMHG

## 2020-10-08 DIAGNOSIS — R07.9 CHEST PAIN, UNSPECIFIED TYPE: Primary | ICD-10-CM

## 2020-10-08 DIAGNOSIS — E66.9 NON MORBID OBESITY, UNSPECIFIED OBESITY TYPE: ICD-10-CM

## 2020-10-08 DIAGNOSIS — E78.2 MIXED HYPERLIPIDEMIA: ICD-10-CM

## 2020-10-08 DIAGNOSIS — R00.2 HEART PALPITATIONS: ICD-10-CM

## 2020-10-08 DIAGNOSIS — I10 ESSENTIAL HYPERTENSION: ICD-10-CM

## 2020-10-08 DIAGNOSIS — M77.8 LEFT SHOULDER TENDONITIS: Primary | ICD-10-CM

## 2020-10-08 DIAGNOSIS — R94.31 ABNORMAL EKG: ICD-10-CM

## 2020-10-08 DIAGNOSIS — Z87.891 FORMER SMOKER: ICD-10-CM

## 2020-10-08 PROCEDURE — 99214 OFFICE O/P EST MOD 30 MIN: CPT | Mod: S$GLB,,, | Performed by: PHYSICIAN ASSISTANT

## 2020-10-08 PROCEDURE — 99214 PR OFFICE/OUTPT VISIT, EST, LEVL IV, 30-39 MIN: ICD-10-PCS | Mod: S$GLB,,, | Performed by: PHYSICIAN ASSISTANT

## 2020-10-08 PROCEDURE — 99204 PR OFFICE/OUTPT VISIT, NEW, LEVL IV, 45-59 MIN: ICD-10-PCS | Mod: S$PBB,,, | Performed by: INTERNAL MEDICINE

## 2020-10-08 PROCEDURE — 93005 ELECTROCARDIOGRAM TRACING: CPT | Mod: PBBFAC | Performed by: INTERNAL MEDICINE

## 2020-10-08 PROCEDURE — 93010 EKG 12-LEAD: ICD-10-PCS | Mod: S$PBB,,, | Performed by: INTERNAL MEDICINE

## 2020-10-08 PROCEDURE — 99999 PR PBB SHADOW E&M-EST. PATIENT-LVL IV: ICD-10-PCS | Mod: PBBFAC,,, | Performed by: INTERNAL MEDICINE

## 2020-10-08 PROCEDURE — 99999 PR PBB SHADOW E&M-EST. PATIENT-LVL IV: CPT | Mod: PBBFAC,,, | Performed by: INTERNAL MEDICINE

## 2020-10-08 PROCEDURE — 93010 ELECTROCARDIOGRAM REPORT: CPT | Mod: S$PBB,,, | Performed by: INTERNAL MEDICINE

## 2020-10-08 PROCEDURE — 99204 OFFICE O/P NEW MOD 45 MIN: CPT | Mod: S$PBB,,, | Performed by: INTERNAL MEDICINE

## 2020-10-08 PROCEDURE — 99214 OFFICE O/P EST MOD 30 MIN: CPT | Mod: PBBFAC,25 | Performed by: INTERNAL MEDICINE

## 2020-10-08 RX ORDER — MELOXICAM 15 MG/1
15 TABLET ORAL DAILY
Qty: 30 TABLET | Refills: 0 | OUTPATIENT
Start: 2020-10-08 | End: 2022-01-06

## 2020-10-08 NOTE — LETTER
October 8, 2020      Nila Guan MD  [address]  [place] [address]           [facility] - Cardiology  [address]  [facility] [address]  Phone: [phone]          Patient: Braeden Rowland   MR Number: [id number]   YOB: 1965   Date of Visit: 10/8/2020       Dear Dr. Nila Guan:    Thank you for referring Braeden Rowland to me for evaluation. Attached you will find relevant portions of my assessment and plan of care.    If you have questions, please do not hesitate to call me. I look forward to following Braeden Rowland along with you.    Sincerely,    Misael Landry MD    Enclosure  CC:  No Recipients    If you would like to receive this communication electronically, please contact [email or web address] or [phone] to request more information on [email or web address] Link access.    For providers and/or their staff who would like to refer a patient to [facility], please contact us through our one-stop-shop provider referral line, [facility], at [phone].    If you feel you have received this communication in error or would no longer like to receive these types of communications, please e-mail [email or web address]

## 2020-10-08 NOTE — PATIENT INSTRUCTIONS
General Discharge Instructions     Rest, ice, repeat. (DO NOT APPLY ICE DIRECTLY TO THE SKIN.  DO NOT LEAVE ON AFFECTED BODY PART FOR MORE THAN 15 MINUTES AT AT TIME TO AVOID INJURY TO SOFT TISSUE).     Repeat X ray in 1 week if you still have pain.  Follow up with orthopedics if the symptoms are not resolving as you thing they should     If you were prescribed a narcotic or controlled medication, do not drive or operate heavy equipment or machinery while taking these medications.  If you were prescribed antibiotics, please take them to completion.  You must understand that you've received an Urgent Care treatment only and that you may be released before all your medical problems are known or treated. You, the patient, will arrange for follow up care as instructed.  Follow up with your PCP or specialty clinic as directed in the next 1-2 weeks if not improved or as needed.  You can call [phone] to schedule an appointment with the appropriate provider.  If your condition worsens we recommend that you receive another evaluation at the emergency room immediately or contact your primary medical clinics after hours call service to discuss your concerns.  Please return here or go to the Emergency Department for any concerns or worsening of condition.      Understanding Rotator Cuff Tendonitis    Tendons are tough tissues that connect muscles to bone. A group of 4 muscles and their tendons form a cuff around the head of the upper arm bone. This is called the rotator cuff. It connects the upper arm to the shoulder blade. It gives the shoulder joint stability and strength.  If tendons are injured or strained, they may become irritated and swollen (inflamed). This is called tendonitis. Rotator cuff tendonitis may cause shoulder pain and loss of function.  What causes rotator cuff tendonitis?  Tendonitis results when the rotator cuff tendons are injured or overworked. The most common cause of injury is repetitive  overhead activities. These can be work-related activities such as reaching, pushing, or lifting. Or they can be sports-related activities such as throwing, swimming, or lifting weights.  Symptoms of rotator cuff tendonitis  Pain on the side of the upper arm is the most common symptom. Pain may get worse with overhead movements or when you raise the arm above shoulder level. It may also hurt to lie on the shoulder at night.  Treatment for rotator cuff tendonitis  Treatment may include the following:  · Active rest. This lets the rotator cuff heal. Active rest means using your arm and shoulder, but avoiding activities that cause pain, such as reaching overhead or sleeping on the shoulder.  · Cold packs. Putting ice packs on the shoulder helps reduce swelling and relieve pain.  · Pain medicines. Prescription or over-the-counter pain medicines can help relieve pain and swelling.  · Arm and shoulder exercises. These help keep the shoulder joint mobile as it heals. They also help improve the strength of muscles around the joint.  Possible complications  It might be tempting to stop using your shoulder completely to avoid pain. But doing so may lead to a condition called frozen shoulder. To help prevent this, following instructions you are given for active rest and for doing exercises to help your shoulder heal.  When to call your healthcare provider  Call your healthcare provider right away if you have any of these:  · Fever of 100.4°F (38°C) or higher, or as directed  · Symptoms that dont get better, or get worse  · New symptoms   Date Last Reviewed: 3/10/2016  © 7774-7845 [employer]. [address] [place] [address], [place] [address]. All rights reserved. This information is not intended as a substitute for professional medical care. Always follow your healthcare professional's instructions.

## 2020-10-08 NOTE — PROGRESS NOTES
CARDIOVASCULAR CONSULTATION    REASON FOR CONSULT:   Braeden Rowland is a 54 y.o. male who presents for CP, f/u ER visit.    Req: oRge  HISTORY OF PRESENT ILLNESS:   The patient is a pleasant 54-year-old man with no prior cardiac history presents with complaints of episodic palpitations and associated left shoulder discomfort.  He also states that the left shoulder discomfort will occur without the palpitations.  He does not describe any paris exertional symptoms or dyspnea.  There has been no syncope.  He denies PND, orthopnea, or lower extremity edema.  There has been no melena, hematuria, or claudicant symptoms.  The patient was unable to reproduce the left shoulder discomfort for me today with provocative maneuvers.    Family history appears to be negative for premature CAD or sudden cardiac death in first-degree relatives.    The patient is a former smoker having quit in March of this year.  He denies alcohol excess or illicit drug use.  He works as an .    CARDIOVASCULAR HISTORY:   none    PAST MEDICAL HISTORY:     Past Medical History:   Diagnosis Date    Chest pain 10/2019    Cigarette smoker     GERD (gastroesophageal reflux disease)     Hyperlipidemia     used to take medication       PAST SURGICAL HISTORY:     Past Surgical History:   Procedure Laterality Date    NO PAST SURGERIES  03/06/2020       ALLERGIES AND MEDICATION:   Review of patient's allergies indicates:  No Known Allergies     Medication List          Accurate as of October 8, 2020 10:05 AM. If you have any questions, ask your nurse or doctor.            CONTINUE taking these medications    acetaminophen 500 MG tablet  Commonly known as: TYLENOL  Take 2 tablets (1,000 mg total) by mouth every 6 (six) hours as needed for Pain.     amLODIPine 5 MG tablet  Commonly known as: NORVASC     aspirin 81 MG Chew  Take 1 tablet (81 mg total) by mouth once daily.     atorvastatin 10 MG tablet  Commonly known as: LIPITOR  Take 1 tablet  (10 mg total) by mouth every evening.     diclofenac sodium 1 % Gel  Commonly known as: VOLTAREN  Apply 2 g topically 4 (four) times daily as needed (Apply to painful area up to 4 times a day as needed for pain). Apply to painful area 4 times a day as needed for pain     famotidine 20 MG tablet  Commonly known as: PEPCID  Take 1 tablet (20 mg total) by mouth 2 (two) times daily.     ibuprofen 600 MG tablet  Commonly known as: ADVIL,MOTRIN  Take 1 tablet (600 mg total) by mouth every 6 (six) hours as needed for Pain (Take with food as needed for mild-to-moderate pain).     * meloxicam 15 MG tablet  Commonly known as: MOBIC  Take 1 tablet (15 mg total) by mouth once daily.     * meloxicam 15 MG tablet  Commonly known as: MOBIC  Take 1 tablet (15 mg total) by mouth once daily.     pantoprazole 20 MG tablet  Commonly known as: PROTONIX  Take 2 tablets (40 mg total) by mouth once daily.     ranitidine 150 MG tablet  Commonly known as: ZANTAC  Take 1 tablet (150 mg total) by mouth nightly.     tiZANidine 4 MG tablet  Commonly known as: ZANAFLEX  Take 1 tablet (4 mg total) by mouth every 8 (eight) hours as needed (Muscle Spasms).         * This list has 2 medication(s) that are the same as other medications prescribed for you. Read the directions carefully, and ask your doctor or other care provider to review them with you.                SOCIAL HISTORY:     Social History     Socioeconomic History    Marital status:      Spouse name: Not on file    Number of children: Not on file    Years of education: Not on file    Highest education level: Not on file   Occupational History    Not on file   Social Needs    Financial resource strain: Not on file    Food insecurity     Worry: Not on file     Inability: Not on file    Transportation needs     Medical: Not on file     Non-medical: Not on file   Tobacco Use    Smoking status: Former Smoker     Packs/day: 0.50     Types: Cigarettes    Smokeless tobacco: Never  "Used   Substance and Sexual Activity    Alcohol use: Yes     Alcohol/week: 21.0 standard drinks     Types: 21 Cans of beer per week     Comment: 3/6/20: "about 3, everyday after I get off work"    Drug use: No    Sexual activity: Not on file   Lifestyle    Physical activity     Days per week: Not on file     Minutes per session: Not on file    Stress: Not on file   Relationships    Social connections     Talks on phone: Not on file     Gets together: Not on file     Attends Gnosticist service: Not on file     Active member of club or organization: Not on file     Attends meetings of clubs or organizations: Not on file     Relationship status: Not on file   Other Topics Concern    Not on file   Social History Narrative    Not on file       FAMILY HISTORY:   History reviewed. No pertinent family history.    REVIEW OF SYSTEMS:   Review of Systems   Constitutional: Negative for chills, diaphoresis and fever.   HENT: Negative for nosebleeds.    Eyes: Negative for blurred vision, double vision and photophobia.   Respiratory: Negative for hemoptysis, shortness of breath and wheezing.    Cardiovascular: Positive for chest pain (L shoulder) and palpitations. Negative for orthopnea, claudication, leg swelling and PND.   Gastrointestinal: Negative for abdominal pain, blood in stool, heartburn, melena, nausea and vomiting.   Genitourinary: Negative for flank pain and hematuria.   Musculoskeletal: Negative for falls, myalgias and neck pain.   Skin: Negative for rash.   Neurological: Negative for dizziness, seizures, loss of consciousness, weakness and headaches.   Endo/Heme/Allergies: Negative for polydipsia. Does not bruise/bleed easily.   Psychiatric/Behavioral: Negative for depression and memory loss. The patient is not nervous/anxious.        PHYSICAL EXAM:     Vitals:    10/08/20 0926   BP: 116/72   Pulse: 71   Resp: 15    Body mass index is 31.15 kg/m².  Weight: 101.3 kg (223 lb 5.2 oz)   Height: 5' 11" (180.3 cm) "     Physical Exam  Vitals signs reviewed.   Constitutional:       General: He is not in acute distress.     Appearance: He is well-developed. He is obese. He is not ill-appearing, toxic-appearing or diaphoretic.   HENT:      Head: Normocephalic and atraumatic.   Eyes:      General: No scleral icterus.     Conjunctiva/sclera: Conjunctivae normal.      Pupils: Pupils are equal, round, and reactive to light.   Neck:      Musculoskeletal: Neck supple. No edema or neck rigidity.      Thyroid: No thyromegaly.      Vascular: Normal carotid pulses. No carotid bruit or JVD.      Trachea: Trachea normal.   Cardiovascular:      Rate and Rhythm: Normal rate and regular rhythm.      Pulses:           Carotid pulses are 2+ on the right side and 2+ on the left side.     Heart sounds: S1 normal and S2 normal. No murmur. No friction rub. No gallop.    Pulmonary:      Effort: Pulmonary effort is normal. No respiratory distress.      Breath sounds: Normal breath sounds. No stridor. No wheezing, rhonchi or rales.   Chest:      Chest wall: No tenderness.   Abdominal:      General: There is no distension.      Palpations: Abdomen is soft.   Musculoskeletal: Normal range of motion.         General: No swelling or tenderness.   Feet:      Right foot:      Skin integrity: No ulcer.      Left foot:      Skin integrity: No ulcer.   Skin:     General: Skin is warm and dry.      Findings: No erythema or rash.   Neurological:      Mental Status: He is alert and oriented to person, place, and time.      Cranial Nerves: No cranial nerve deficit.   Psychiatric:         Speech: Speech normal.         Behavior: Behavior normal. Behavior is cooperative.         DATA:   EKG: (personally reviewed tracing(s))  10/8/20 SR 71, inflat ST abnl ?isch, more prom than 9/16/20    Laboratory:  CBC:  Recent Labs   Lab 09/18/19  1605 03/06/20  0912 09/16/20  0643   WBC 8.35 6.63 6.08   Hemoglobin 14.4 15.8 14.5   Hematocrit 44.5 49.2 45.7   Platelets 261 250 252        CHEMISTRIES:  Recent Labs   Lab 09/18/19  1859 03/06/20  0912 09/16/20  0643   Glucose 97 121 H 98   Sodium 140 137 138   Potassium 4.9 4.0 4.1   BUN, Bld 8 12 10   Creatinine 1.0 1.2 1.0   eGFR if African American >60 >60 >60   eGFR if non African American >60 >60 >60   Calcium 10.2 9.7 9.3       CARDIAC BIOMARKERS:  Recent Labs   Lab 03/06/20  1226 03/06/20  1706 09/16/20  0643   Troponin I <0.006 <0.006 <0.006       COAGS:        LIPIDS/LFTS:  Recent Labs   Lab 09/18/19  1859 03/06/20  0912 03/06/20  0913 09/16/20  0643   Cholesterol  --   --  247 H  --    Triglycerides  --   --  137  --    HDL  --   --  56  --    LDL Cholesterol  --   --  163.6 H  --    Non-HDL Cholesterol  --   --  191  --    AST 17 22  --  20   ALT 16 19  --  29     Lab Results   Component Value Date    TSH 1.548 05/13/2020         Cardiovascular Testing:  Echo 3/6/20  · Normal left ventricular systolic function. The estimated ejection fraction is 60%.  · Grade I (mild) left ventricular diastolic dysfunction consistent with impaired relaxation.  · Normal right ventricular systolic function.  · Mild left atrial enlargement.  · Normal central venous pressure (3 mmHg).  · The estimated PA systolic pressure is 28 mmHg.    ASSESSMENT:   # CP/L shoulder pain, ?arthritic  # palps  # abnl EKG, ?inflerolateral ischemia, new vs prior tracing  # HTN, controlled  # HLP on atorva 10mg  # former smoker  # BMI 31    PLAN:   Cont med rx  Cont ASA 81mg qd  Ex MPI  Echo  Holter  Check lipids/LFT  RTC 1 month for review    Misael Landry MD, FACC

## 2020-10-08 NOTE — PROGRESS NOTES
"Subjective:       Patient ID: Braeden Rowland is a 54 y.o. male.    Vitals:  height is 5' 11" (1.803 m) and weight is 101.2 kg (223 lb). His temperature is 98.6 °F (37 °C). His blood pressure is 142/84 (abnormal) and his pulse is 86. His oxygen saturation is 96%.     Chief Complaint: Shoulder Pain    Patient presenting with intermittent left shoulder pain with radiation to the arm for over 2 months. States that this is a chronic issue and has been seen for this multiple times in the past. Referral for orthopedics and PT/OT ordered (8/22/2020 and 9/9/2020 respectively). He was on Mobic 15mg but is no longer taking it but has been applying Voltaren gel without relief. Most recent X-ray 6/7/2020 wnl. Denies any subsequent injury to the area.    Shoulder Pain   The pain is present in the left shoulder. This is a chronic problem. The current episode started in the past 7 days. The problem occurs every several days. The problem has been gradually worsening. Associated symptoms include a limited range of motion, numbness and tingling. Pertinent negatives include no fever or headaches. He has tried NSAIDS for the symptoms.       Constitution: Negative for chills, fatigue and fever.   HENT: Negative for congestion and sore throat.    Neck: Negative for painful lymph nodes.   Cardiovascular: Negative for chest pain and leg swelling.   Eyes: Negative for double vision and blurred vision.   Respiratory: Negative for cough and shortness of breath.    Gastrointestinal: Negative for nausea, vomiting and diarrhea.   Genitourinary: Negative for dysuria, frequency and urgency.   Musculoskeletal: Positive for pain, joint pain and abnormal ROM of joint. Negative for joint swelling, muscle cramps and muscle ache.   Skin: Negative for color change, pale and rash.   Allergic/Immunologic: Negative for seasonal allergies.   Neurological: Positive for numbness. Negative for dizziness, history of vertigo, light-headedness, passing out and " headaches.   Hematologic/Lymphatic: Negative for swollen lymph nodes, easy bruising/bleeding and history of blood clots. Does not bruise/bleed easily.   Psychiatric/Behavioral: Negative for nervous/anxious, sleep disturbance and depression. The patient is not nervous/anxious.        Objective:      Physical Exam   Constitutional: He is oriented to person, place, and time. He appears well-developed. He is cooperative.  Non-toxic appearance. He does not appear ill. No distress.   HENT:   Head: Normocephalic and atraumatic.   Ears:   Right Ear: Hearing, tympanic membrane, external ear and ear canal normal.   Left Ear: Hearing, tympanic membrane, external ear and ear canal normal.   Nose: Nose normal. No mucosal edema, rhinorrhea or nasal deformity. No epistaxis. Right sinus exhibits no maxillary sinus tenderness and no frontal sinus tenderness. Left sinus exhibits no maxillary sinus tenderness and no frontal sinus tenderness.   Mouth/Throat: Uvula is midline, oropharynx is clear and moist and mucous membranes are normal. No trismus in the jaw. Normal dentition. No uvula swelling. No posterior oropharyngeal erythema.   Eyes: Conjunctivae and lids are normal. Right eye exhibits no discharge. Left eye exhibits no discharge. No scleral icterus.   Neck: Trachea normal, normal range of motion, full passive range of motion without pain and phonation normal. Neck supple.   Cardiovascular: Normal rate, regular rhythm, normal heart sounds and normal pulses.   Pulmonary/Chest: Effort normal and breath sounds normal. No respiratory distress.   Abdominal: Soft. Normal appearance and bowel sounds are normal. He exhibits no distension, no pulsatile midline mass and no mass. There is no abdominal tenderness.   Musculoskeletal:         General: No deformity.      Left shoulder: He exhibits tenderness and bony tenderness. He exhibits normal range of motion.        Arms:    Neurological: He is alert and oriented to person, place, and  time. He exhibits normal muscle tone. Coordination normal.   Skin: Skin is warm, dry, intact, not diaphoretic and not pale. Psychiatric: His speech is normal and behavior is normal. Judgment and thought content normal.   Nursing note and vitals reviewed.        Assessment:       1. Left shoulder tendonitis        Plan:         Left shoulder tendonitis  -     Ambulatory referral/consult to Orthopedics  -     meloxicam (MOBIC) 15 MG tablet; Take 1 tablet (15 mg total) by mouth once daily. With meals  Dispense: 30 tablet; Refill: 0          Patient Instructions     General Discharge Instructions     Rest, ice, repeat. (DO NOT APPLY ICE DIRECTLY TO THE SKIN.  DO NOT LEAVE ON AFFECTED BODY PART FOR MORE THAN 15 MINUTES AT AT TIME TO AVOID INJURY TO SOFT TISSUE).     Repeat X ray in 1 week if you still have pain.  Follow up with orthopedics if the symptoms are not resolving as you thing they should     If you were prescribed a narcotic or controlled medication, do not drive or operate heavy equipment or machinery while taking these medications.  If you were prescribed antibiotics, please take them to completion.  You must understand that you've received an Urgent Care treatment only and that you may be released before all your medical problems are known or treated. You, the patient, will arrange for follow up care as instructed.  Follow up with your PCP or specialty clinic as directed in the next 1-2 weeks if not improved or as needed.  You can call [phone] to schedule an appointment with the appropriate provider.  If your condition worsens we recommend that you receive another evaluation at the emergency room immediately or contact your primary medical clinics after hours call service to discuss your concerns.  Please return here or go to the Emergency Department for any concerns or worsening of condition.      Understanding Rotator Cuff Tendonitis    Tendons are tough tissues that connect muscles to bone. A group  of 4 muscles and their tendons form a cuff around the head of the upper arm bone. This is called the rotator cuff. It connects the upper arm to the shoulder blade. It gives the shoulder joint stability and strength.  If tendons are injured or strained, they may become irritated and swollen (inflamed). This is called tendonitis. Rotator cuff tendonitis may cause shoulder pain and loss of function.  What causes rotator cuff tendonitis?  Tendonitis results when the rotator cuff tendons are injured or overworked. The most common cause of injury is repetitive overhead activities. These can be work-related activities such as reaching, pushing, or lifting. Or they can be sports-related activities such as throwing, swimming, or lifting weights.  Symptoms of rotator cuff tendonitis  Pain on the side of the upper arm is the most common symptom. Pain may get worse with overhead movements or when you raise the arm above shoulder level. It may also hurt to lie on the shoulder at night.  Treatment for rotator cuff tendonitis  Treatment may include the following:  · Active rest. This lets the rotator cuff heal. Active rest means using your arm and shoulder, but avoiding activities that cause pain, such as reaching overhead or sleeping on the shoulder.  · Cold packs. Putting ice packs on the shoulder helps reduce swelling and relieve pain.  · Pain medicines. Prescription or over-the-counter pain medicines can help relieve pain and swelling.  · Arm and shoulder exercises. These help keep the shoulder joint mobile as it heals. They also help improve the strength of muscles around the joint.  Possible complications  It might be tempting to stop using your shoulder completely to avoid pain. But doing so may lead to a condition called frozen shoulder. To help prevent this, following instructions you are given for active rest and for doing exercises to help your shoulder heal.  When to call your healthcare provider  Call your  healthcare provider right away if you have any of these:  · Fever of 100.4°F (38°C) or higher, or as directed  · Symptoms that dont get better, or get worse  · New symptoms   Date Last Reviewed: 3/10/2016  © 2954-4604 [employer]. [address] [place] [id number]. All rights reserved. This information is not intended as a substitute for professional medical care. Always follow your healthcare professional's instructions.

## 2020-10-20 ENCOUNTER — HOSPITAL ENCOUNTER (OUTPATIENT)
Dept: RADIOLOGY | Facility: HOSPITAL | Age: 55
Discharge: HOME | End: 2020-10-20
Attending: INTERNAL MEDICINE
Payer: MEDICAID

## 2020-10-20 ENCOUNTER — HOSPITAL ENCOUNTER (OUTPATIENT)
Dept: CARDIOLOGY | Facility: HOSPITAL | Age: 55
Discharge: HOME | End: 2020-10-20
Attending: INTERNAL MEDICINE
Payer: MEDICAID

## 2020-10-20 DIAGNOSIS — R00.2 HEART PALPITATIONS: ICD-10-CM

## 2020-10-20 DIAGNOSIS — R94.31 ABNORMAL EKG: ICD-10-CM

## 2020-10-20 DIAGNOSIS — R07.9 CHEST PAIN, UNSPECIFIED TYPE: ICD-10-CM

## 2020-10-20 LAB
AORTIC ROOT ANNULUS: 3.32 CM
AORTIC VALVE CUSP SEPERATION: 2.36 CM
ASCENDING AORTA: 2.98 CM
AV INDEX (PROSTH): 0.7
AV MEAN GRADIENT: 4 MMHG
AV PEAK GRADIENT: 8 MMHG
AV VALVE AREA: 2.92 CM2
AV VELOCITY RATIO: 0.72
CV ECHO LV RWT: 0.45 CM
CV STRESS BASE HR: 76 BPM
DIASTOLIC BLOOD PRESSURE: 91 MMHG
DOP CALC AO PEAK VEL: 1.39 M/S
DOP CALC AO VTI: 25.03 CM
DOP CALC LVOT AREA: 4.2 CM2
DOP CALC LVOT DIAMETER: 2.31 CM
DOP CALC LVOT PEAK VEL: 1 M/S
DOP CALC LVOT STROKE VOLUME: 72.97 CM3
DOP CALCLVOT PEAK VEL VTI: 17.42 CM
E WAVE DECELERATION TIME: 282.69 MSEC
E/A RATIO: 0.9
ECHO LV POSTERIOR WALL: 1.23 CM (ref 0.6–1.1)
FRACTIONAL SHORTENING: 25 % (ref 28–44)
INTERVENTRICULAR SEPTUM: 1.22 CM (ref 0.6–1.1)
IVRT: 96.89 MSEC
LA MAJOR: 5.56 CM
LA MINOR: 4.72 CM
LA WIDTH: 3.34 CM
LEFT ATRIUM SIZE: 3.72 CM
LEFT ATRIUM VOLUME: 53.92 CM3
LEFT INTERNAL DIMENSION IN SYSTOLE: 4.1 CM (ref 2.1–4)
LEFT VENTRICLE DIASTOLIC VOLUME: 144.06 ML
LEFT VENTRICLE SYSTOLIC VOLUME: 74.34 ML
LEFT VENTRICULAR INTERNAL DIMENSION IN DIASTOLE: 5.45 CM (ref 3.5–6)
LEFT VENTRICULAR MASS: 276.13 G
MV PEAK A VEL: 0.68 M/S
MV PEAK E VEL: 0.61 M/S
MV STENOSIS PRESSURE HALF TIME: 81.98 MS
MV VALVE AREA P 1/2 METHOD: 2.68 CM2
NUC REST EJECTION FRACTION: 57
OHS CV CPX 1 MINUTE RECOVERY HEART RATE: 142 BPM
OHS CV CPX 85 PERCENT MAX PREDICTED HEART RATE MALE: 140
OHS CV CPX ESTIMATED METS: 10.4
OHS CV CPX MAX PREDICTED HEART RATE: 165
OHS CV CPX PATIENT IS FEMALE: 0
OHS CV CPX PATIENT IS MALE: 1
OHS CV CPX PEAK DIASTOLIC BLOOD PRESSURE: 86 MMHG
OHS CV CPX PEAK HEAR RATE: 164 BPM
OHS CV CPX PEAK RATE PRESSURE PRODUCT: NORMAL
OHS CV CPX PEAK SYSTOLIC BLOOD PRESSURE: 194 MMHG
OHS CV CPX PERCENT MAX PREDICTED HEART RATE ACHIEVED: 99
OHS CV CPX RATE PRESSURE PRODUCT PRESENTING: NORMAL
PISA TR MAX VEL: 2.66 M/S
PULM VEIN S/D RATIO: 1.66
PV PEAK D VEL: 0.38 M/S
PV PEAK S VEL: 0.63 M/S
PV PEAK VELOCITY: 1.09 CM/S
RA MAJOR: 5.42 CM
RA PRESSURE: 3 MMHG
RA WIDTH: 3.27 CM
RIGHT VENTRICULAR END-DIASTOLIC DIMENSION: 3.92 CM
RV TISSUE DOPPLER FREE WALL SYSTOLIC VELOCITY 1 (APICAL 4 CHAMBER VIEW): 10.04 CM/S
SINUS: 3.25 CM
STJ: 2.78 CM
STRESS ECHO POST EXERCISE DUR MIN: 9 MINUTES
STRESS ECHO POST EXERCISE DUR SEC: 12 SECONDS
SYSTOLIC BLOOD PRESSURE: 144 MMHG
TR MAX PG: 28 MMHG
TRICUSPID ANNULAR PLANE SYSTOLIC EXCURSION: 1.91 CM
TV REST PULMONARY ARTERY PRESSURE: 31 MMHG

## 2020-10-20 PROCEDURE — 78452 STRESS TEST WITH MYOCARDIAL PERFUSION (CUPID ONLY): ICD-10-PCS | Mod: 26,,, | Performed by: INTERNAL MEDICINE

## 2020-10-20 PROCEDURE — 93227 HOLTER MONITOR - 24 HOUR (CUPID ONLY): ICD-10-PCS | Mod: ,,, | Performed by: INTERNAL MEDICINE

## 2020-10-20 PROCEDURE — 93018 STRESS TEST WITH MYOCARDIAL PERFUSION (CUPID ONLY): ICD-10-PCS | Mod: ,,, | Performed by: INTERNAL MEDICINE

## 2020-10-20 PROCEDURE — 93017 CV STRESS TEST TRACING ONLY: CPT

## 2020-10-20 PROCEDURE — 78452 HT MUSCLE IMAGE SPECT MULT: CPT | Mod: 26,,, | Performed by: INTERNAL MEDICINE

## 2020-10-20 PROCEDURE — 93225 XTRNL ECG REC<48 HRS REC: CPT

## 2020-10-20 PROCEDURE — 93018 CV STRESS TEST I&R ONLY: CPT | Mod: ,,, | Performed by: INTERNAL MEDICINE

## 2020-10-20 PROCEDURE — 93306 ECHO (CUPID ONLY): ICD-10-PCS | Mod: 26,,, | Performed by: INTERNAL MEDICINE

## 2020-10-20 PROCEDURE — 93227 XTRNL ECG REC<48 HR R&I: CPT | Mod: ,,, | Performed by: INTERNAL MEDICINE

## 2020-10-20 PROCEDURE — 93306 TTE W/DOPPLER COMPLETE: CPT | Mod: 26,,, | Performed by: INTERNAL MEDICINE

## 2020-10-20 PROCEDURE — 93016 STRESS TEST WITH MYOCARDIAL PERFUSION (CUPID ONLY): ICD-10-PCS | Mod: ,,, | Performed by: INTERNAL MEDICINE

## 2020-10-20 PROCEDURE — A9502 TC99M TETROFOSMIN: HCPCS

## 2020-10-20 PROCEDURE — 93306 TTE W/DOPPLER COMPLETE: CPT

## 2020-10-20 PROCEDURE — 93016 CV STRESS TEST SUPVJ ONLY: CPT | Mod: ,,, | Performed by: INTERNAL MEDICINE

## 2020-10-22 LAB
OHS CV EVENT MONITOR DAY: 0
OHS CV HOLTER LENGTH DECIMAL HOURS: 23.98
OHS CV HOLTER LENGTH HOURS: 23
OHS CV HOLTER LENGTH MINUTES: 59

## 2020-10-25 ENCOUNTER — OFFICE VISIT (OUTPATIENT)
Dept: URGENT CARE | Facility: CLINIC | Age: 55
End: 2020-10-25
Payer: MEDICAID

## 2020-10-25 ENCOUNTER — HOSPITAL ENCOUNTER (EMERGENCY)
Facility: HOSPITAL | Age: 55
Discharge: HOME | End: 2020-10-25
Attending: EMERGENCY MEDICINE
Payer: MEDICAID

## 2020-10-25 VITALS
TEMPERATURE: 97 F | SYSTOLIC BLOOD PRESSURE: 156 MMHG | OXYGEN SATURATION: 97 % | HEART RATE: 73 BPM | DIASTOLIC BLOOD PRESSURE: 87 MMHG | RESPIRATION RATE: 16 BRPM

## 2020-10-25 VITALS
BODY MASS INDEX: 30.8 KG/M2 | HEIGHT: 71 IN | WEIGHT: 220 LBS | SYSTOLIC BLOOD PRESSURE: 131 MMHG | OXYGEN SATURATION: 98 % | HEART RATE: 77 BPM | RESPIRATION RATE: 17 BRPM | TEMPERATURE: 98 F | DIASTOLIC BLOOD PRESSURE: 90 MMHG

## 2020-10-25 DIAGNOSIS — R07.9 CHEST PAIN: ICD-10-CM

## 2020-10-25 DIAGNOSIS — K21.9 GASTROESOPHAGEAL REFLUX DISEASE, UNSPECIFIED WHETHER ESOPHAGITIS PRESENT: Primary | ICD-10-CM

## 2020-10-25 DIAGNOSIS — K62.5 BRIGHT RED RECTAL BLEEDING: ICD-10-CM

## 2020-10-25 LAB
ALBUMIN SERPL-MCNC: 4.2 G/DL (ref 3.3–5.5)
ALP SERPL-CCNC: 69 U/L (ref 42–141)
BILIRUB SERPL-MCNC: 0.6 MG/DL (ref 0.2–1.6)
BUN SERPL-MCNC: 10 MG/DL (ref 7–22)
CALCIUM SERPL-MCNC: 9.6 MG/DL (ref 8–10.3)
CHLORIDE SERPL-SCNC: 106 MMOL/L (ref 98–108)
CREAT SERPL-MCNC: 0.7 MG/DL (ref 0.6–1.2)
GLUCOSE SERPL-MCNC: 90 MG/DL (ref 73–118)
POC ALT (SGPT): 26 U/L (ref 10–47)
POC AST (SGOT): 27 U/L (ref 11–38)
POC CARDIAC TROPONIN I: 0 NG/ML
POC PTINR: 1 (ref 0.9–1.2)
POC PTWBT: 11.6 SEC (ref 9.7–14.3)
POC TCO2: 29 MMOL/L (ref 18–33)
POTASSIUM BLD-SCNC: 3.6 MMOL/L (ref 3.6–5.1)
PROTEIN, POC: 7.5 G/DL (ref 6.4–8.1)
SAMPLE: NORMAL
SAMPLE: NORMAL
SODIUM BLD-SCNC: 142 MMOL/L (ref 128–145)

## 2020-10-25 PROCEDURE — 99214 OFFICE O/P EST MOD 30 MIN: CPT | Mod: S$GLB,,, | Performed by: PHYSICIAN ASSISTANT

## 2020-10-25 PROCEDURE — 99285 EMERGENCY DEPT VISIT HI MDM: CPT | Mod: 25,ER

## 2020-10-25 PROCEDURE — 80053 COMPREHEN METABOLIC PANEL: CPT | Mod: ER

## 2020-10-25 PROCEDURE — 84484 ASSAY OF TROPONIN QUANT: CPT | Mod: ER

## 2020-10-25 PROCEDURE — 85025 COMPLETE CBC W/AUTO DIFF WBC: CPT | Mod: ER

## 2020-10-25 PROCEDURE — 85610 PROTHROMBIN TIME: CPT | Mod: ER

## 2020-10-25 PROCEDURE — 99214 PR OFFICE/OUTPT VISIT, EST, LEVL IV, 30-39 MIN: ICD-10-PCS | Mod: S$GLB,,, | Performed by: PHYSICIAN ASSISTANT

## 2020-10-25 RX ORDER — ACETAMINOPHEN 500 MG
1000 TABLET ORAL EVERY 6 HOURS PRN
Qty: 30 TABLET | Refills: 0 | OUTPATIENT
Start: 2020-10-25 | End: 2021-09-15

## 2020-10-25 RX ORDER — PANTOPRAZOLE SODIUM 20 MG/1
40 TABLET, DELAYED RELEASE ORAL DAILY
Qty: 60 TABLET | Refills: 0 | OUTPATIENT
Start: 2020-10-25 | End: 2022-01-06

## 2020-10-25 NOTE — PATIENT INSTRUCTIONS
If symptoms persist, you will need to follow up with a gastroenterologist for further evaluation and possibly an upper GI scope. A referral was placed. Please contact your insurance company to find a provider in your area and call to schedule an appointment.    If your condition worsens or fails to improve we recommend that you receive another evaluation at the ER immediately. You must understand that you've received an urgent care treatment only and that you may be released before all your medical problems are known or treated. You the patient will arrange for followup care as instructed.     Watch for any increase pain, fever, localized pain to right lower abdomen or continued vomiting or diarrhea.      STOP pepcid.  Take protonix (pantoprazole) as prescribed.     Please drink plenty of fluids and get plenty of rest.     Avoid laying down for 2 hours after eating.   Sit up at least 30 minutes after eating a meal.  Avoid eating late evening meals 2 hours before bedtime.  Avoid food that can make your symptoms worse such as chocolate, peppermint, fatty, greasy, fried, and spicy foods.   Avoid Alcohol.   Elevate your head of bed by about 6 to 8 inches while sleeping (examples:  Putting blocks of wood or rubber under 2 legs of the bed or Styrofoam wedge under the mattress).      If you smoke, stop smoking.           GERD (Adult)    The esophagus is a tube that carries food from the mouth to the stomach. A valve at the lower end of the esophagus prevents stomach acid from flowing upward. When this valve doesn't work properly, stomach contents may repeatedly flow back up (reflux) into the esophagus. This is called gastroesophageal reflux disease (GERD). GERD can irritate the esophagus. It can cause problems with swallowing or breathing. In severe cases, GERD can cause recurrent pneumonia or other serious problems.  Symptoms of reflux include burning, pressure or sharp pain in the upper abdomen or mid to lower chest.  "The pain can spread to the neck, back, or shoulder. There may be belching, an acid taste in the back of the throat, chronic cough, or sore throat or hoarseness. GERD symptoms often occur during the day after a big meal. They can also occur at night when lying down.   Home care  Lifestyle changes can help reduce symptoms. If needed, medicines may be prescribed. Symptoms often improve with treatment, but if treatment is stopped, the symptoms often return after a few months. So most persons with GERD will need to continue treatment.  Lifestyle changes  · Limit or avoid fatty, fried, and spicy foods, as well as coffee, chocolate, mint, and foods with high acid content such as tomatoes and citrus fruit and juices (orange, grapefruit, lemon).  · Dont eat large meals, especially at night. Frequent, smaller meals are best. Do not lie down right after eating. And dont eat anything 3 hours before going to bed.  · Avoid drinking alcohol and smoking. As much as possible, stay away from second hand smoke.  · If you are overweight, losing weight will reduce symptoms.   · Avoid wearing tight clothing around your stomach area.  · If your symptoms occur during sleep, use a foam wedge to elevate your upper body (not just your head.) Or, place 4" blocks under the head of your bed.  Medicines  If needed, medicines can help relieve the symptoms of GERD and prevent damage to the esophagus. Discuss a medicine plan with your healthcare provider. This may include one or more of the following medicines:  · Antacids to help neutralize the normal acids in your stomach.  · Acid blockers (H2 blockers) to decrease acid production.  · Acid inhibitors (PPIs) to decrease acid production in a different way than the blockers. They may work better, but can take a little longer to take effect.  Take an antacid 30-60 minutes after eating and at bedtime, but not at the same time as an acid blocker.  Try not to take medicines such as ibuprofen and " aspirin. If you are taking aspirin for your heart or other medical reasons, talk to your healthcare provider about stopping it.  Follow-up care  Follow up with your healthcare provider or as advised by our staff.  When to seek medical advice  Call your healthcare provider if any of the following occur:  · Stomach pain gets worse or moves to the lower right abdomen (appendix area)  · Chest pain appears or gets worse, or spreads to the back, neck, shoulder, or arm  · Frequent vomiting (cant keep down liquids)  · Blood in the stool or vomit (red or black in color)  · Feeling weak or dizzy  · Fever of 100.4ºF (38ºC) or higher, or as directed by your healthcare provider  Date Last Reviewed: 6/23/2015 © 2000-2017 [employer]. [address] [place] [id number]. All rights reserved. This information is not intended as a substitute for professional medical care. Always follow your healthcare professional's instructions.        Understanding Rectal Bleeding    Rectal bleeding is when blood passes through your rectum and anus. It can happen with or without a bowel movement. Rectal bleeding may be a sign of a serious problem in your rectum, colon, or upper GI tract. Call your healthcare provider right away if you have any rectal bleeding.  The GI Tract  The gastrointestinal (GI) tract includes the mouth, esophagus, stomach, small intestine, large intestine (colon), rectum, and anus. The food you eat is digested as it passes through the GI tract. Solid waste leaves the body through the rectum.   Rectal bleeding and GI problems  The cause of rectal bleeding may be found in any region of the GI tract. The colon or rectum may be the site of your bleeding problem. Or, bleeding may be due to problems farther up the GI tract, such as in the small intestine, duodenum, or stomach.  Causes of rectal bleeding  Rectal bleeding causes include the following:  · Hemorrhoids (swollen veins in the rectum and  anus)  · Fissures (tears in or near the anus)  · Diverticulosis (inflamed pockets in the colon wall)  · Infection  · Ischemia (low blood flow)  · Radiation damage  · Inflammatory bowel disease (Crohn's disease or ulcerative colitis)  · Ulcers in the upper GI tract and inflammation of the large intestine  · Abnormal tissue growths (tumors or polyps) in the GI tract  · A bulging rectum (also called a rectal prolapse)  · Abnormal blood vessels in the small intestine or in the colon  Common symptoms  Common symptoms include the following:  · Rectal pain, itching, or soreness  · Belly pain or epigastric pain  · Minor occasional drops of blood that appear on the stool or toilet paper, to greater amounts of stool that appear black or tarry   Rectal bleeding can also happen without pain.  Date Last Reviewed: 7/1/2016 © 2000-2017 [employer]. [address] [place] [id number]. All rights reserved. This information is not intended as a substitute for professional medical care. Always follow your healthcare professional's instructions.

## 2020-10-25 NOTE — PROGRESS NOTES
"Subjective:       Patient ID: Braeden Rowland is a 54 y.o. male.    Vitals:  temperature is 97.3 °F (36.3 °C). His blood pressure is 156/87 (abnormal) and his pulse is 73. His respiration is 16 and oxygen saturation is 97%.     Chief Complaint: Rectal Bleeding    Mr. Rowland is a 55yo male with a PMHx of GERD, HTN, and alcohol/tobacco abuse who presents to the urgent care for evaluation with c/o acid reflux flares since an ER doctor changed him from protonix 40mg daily to pepcid 20mg daily. States he does not feel the pepcid is helping as all as he is having epigastric pain and heartburn more than ever. Denies increase in spicy, greasy or fried foods recently. He does endorse one episode of a "drop" of bright red blood on toilet paper after having a BM on Tuesday of this week. Denies reoccurrence. Denies pain with BM, straining, constipation, diarrhea, hard/soft stools, nausea, vomiting, lower abdominal pain, fever. States he recently did a cologuard testing kit with PCP and they told him it was abnormal so he needed to have a colonoscopy for further evaluation. States his PCP scheduled a colonoscopy for the beginning of next month. He is asking for a GI referral for further evaluation/management of GERD as she does not currently have one.    Rectal Bleeding  This is a new problem. The current episode started in the past 7 days (Tuesday). Episode frequency: once. The problem has been resolved. Associated symptoms include abdominal pain (epigastric). Pertinent negatives include no anorexia, arthralgias, change in bowel habit, chest pain, chills, congestion, coughing, diaphoresis, fatigue, fever, headaches, joint swelling, myalgias, nausea, neck pain, numbness, rash, sore throat, swollen glands, urinary symptoms, vertigo, visual change or vomiting.       Constitution: Negative for chills, sweating, fatigue and fever.   HENT: Negative for ear pain, congestion and sore throat.    Neck: Negative for neck pain, neck " stiffness and painful lymph nodes.   Cardiovascular: Negative for chest pain, leg swelling and palpitations.   Eyes: Negative for double vision and blurred vision.   Respiratory: Negative for chest tightness, cough, sputum production, shortness of breath and wheezing.    Gastrointestinal: Positive for abdominal pain (epigastric), bright red blood in stool and heartburn. Negative for abdominal bloating, nausea, vomiting, constipation, diarrhea, dark colored stools, rectal pain, hemorrhoids and bowel incontinence.   Genitourinary: Negative for dysuria, frequency and urgency.   Musculoskeletal: Negative for joint pain, joint swelling, muscle cramps and muscle ache.   Skin: Negative for color change, pale and rash.   Allergic/Immunologic: Negative for seasonal allergies, itching and sneezing.   Neurological: Negative for dizziness, history of vertigo, light-headedness, passing out, headaches and numbness.   Hematologic/Lymphatic: Negative for swollen lymph nodes, easy bruising/bleeding and history of blood clots. Does not bruise/bleed easily.   Psychiatric/Behavioral: Negative for nervous/anxious, sleep disturbance and depression. The patient is not nervous/anxious.        Objective:      Physical Exam   Constitutional: He is oriented to person, place, and time. He appears well-developed.  Non-toxic appearance. He does not appear ill. No distress.   HENT:   Head: Normocephalic and atraumatic.   Ears:   Right Ear: External ear normal.   Left Ear: External ear normal.   Nose: Nose normal.   Mouth/Throat: Mucous membranes are normal. No posterior oropharyngeal erythema.   Eyes: Pupils are equal, round, and reactive to light. Conjunctivae and lids are normal.   Neck: Trachea normal, normal range of motion and full passive range of motion without pain. Neck supple.   Cardiovascular: Normal rate, regular rhythm and normal heart sounds.   Pulmonary/Chest: Effort normal and breath sounds normal. No stridor. No respiratory  distress. He has no wheezes. He has no rhonchi. He has no rales.   Abdominal: Soft. Normal appearance and bowel sounds are normal. He exhibits no distension, no abdominal bruit, no pulsatile midline mass and no mass. There is abdominal tenderness in the epigastric area. There is no rebound, no guarding, no left CVA tenderness and no right CVA tenderness.   Genitourinary:         Comments: Deferred rectal exam/fecal occult blood testing; states he will go to colonoscopy as scheduled     Musculoskeletal: Normal range of motion.   Lymphadenopathy:     He has no cervical adenopathy.   Neurological: He is alert and oriented to person, place, and time. He has normal strength. He displays no weakness.   Skin: Skin is warm, dry, intact, not diaphoretic and not pale. Psychiatric: His speech is normal and behavior is normal. Mood, judgment and thought content normal.   Nursing note and vitals reviewed.      Clinically well appearing, VSS. Advised to stop pepcid. Will change to protonix as patient has had good relief with this medication in the past. GI referral placed. Stressed importance of keeping colonoscopy as currently scheduled. Advised on return/follow-up precautions. Advised on ER precautions. Answered all patient questions. Patient verbalized understanding and voiced agreement with current treatment plan.        Assessment:       1. Gastroesophageal reflux disease, unspecified whether esophagitis present    2. Bright red rectal bleeding        Plan:         Gastroesophageal reflux disease, unspecified whether esophagitis present  -     pantoprazole (PROTONIX) 20 MG tablet; Take 2 tablets (40 mg total) by mouth once daily.  Dispense: 60 tablet; Refill: 0  -     Ambulatory referral/consult to Gastroenterology    Bright red rectal bleeding  Comments:  resolved      Patient Instructions       If symptoms persist, you will need to follow up with a gastroenterologist for further evaluation and possibly an upper GI scope. A  referral was placed. Please contact your insurance company to find a provider in your area and call to schedule an appointment.    If your condition worsens or fails to improve we recommend that you receive another evaluation at the ER immediately. You must understand that you've received an urgent care treatment only and that you may be released before all your medical problems are known or treated. You the patient will arrange for followup care as instructed.     Watch for any increase pain, fever, localized pain to right lower abdomen or continued vomiting or diarrhea.      STOP pepcid.  Take protonix (pantoprazole) as prescribed.     Please drink plenty of fluids and get plenty of rest.     Avoid laying down for 2 hours after eating.   Sit up at least 30 minutes after eating a meal.  Avoid eating late evening meals 2 hours before bedtime.  Avoid food that can make your symptoms worse such as chocolate, peppermint, fatty, greasy, fried, and spicy foods.   Avoid Alcohol.   Elevate your head of bed by about 6 to 8 inches while sleeping (examples:  Putting blocks of wood or rubber under 2 legs of the bed or Styrofoam wedge under the mattress).      If you smoke, stop smoking.           GERD (Adult)    The esophagus is a tube that carries food from the mouth to the stomach. A valve at the lower end of the esophagus prevents stomach acid from flowing upward. When this valve doesn't work properly, stomach contents may repeatedly flow back up (reflux) into the esophagus. This is called gastroesophageal reflux disease (GERD). GERD can irritate the esophagus. It can cause problems with swallowing or breathing. In severe cases, GERD can cause recurrent pneumonia or other serious problems.  Symptoms of reflux include burning, pressure or sharp pain in the upper abdomen or mid to lower chest. The pain can spread to the neck, back, or shoulder. There may be belching, an acid taste in the back of the throat, chronic cough, or  "sore throat or hoarseness. GERD symptoms often occur during the day after a big meal. They can also occur at night when lying down.   Home care  Lifestyle changes can help reduce symptoms. If needed, medicines may be prescribed. Symptoms often improve with treatment, but if treatment is stopped, the symptoms often return after a few months. So most persons with GERD will need to continue treatment.  Lifestyle changes  · Limit or avoid fatty, fried, and spicy foods, as well as coffee, chocolate, mint, and foods with high acid content such as tomatoes and citrus fruit and juices (orange, grapefruit, lemon).  · Dont eat large meals, especially at night. Frequent, smaller meals are best. Do not lie down right after eating. And dont eat anything 3 hours before going to bed.  · Avoid drinking alcohol and smoking. As much as possible, stay away from second hand smoke.  · If you are overweight, losing weight will reduce symptoms.   · Avoid wearing tight clothing around your stomach area.  · If your symptoms occur during sleep, use a foam wedge to elevate your upper body (not just your head.) Or, place 4" blocks under the head of your bed.  Medicines  If needed, medicines can help relieve the symptoms of GERD and prevent damage to the esophagus. Discuss a medicine plan with your healthcare provider. This may include one or more of the following medicines:  · Antacids to help neutralize the normal acids in your stomach.  · Acid blockers (H2 blockers) to decrease acid production.  · Acid inhibitors (PPIs) to decrease acid production in a different way than the blockers. They may work better, but can take a little longer to take effect.  Take an antacid 30-60 minutes after eating and at bedtime, but not at the same time as an acid blocker.  Try not to take medicines such as ibuprofen and aspirin. If you are taking aspirin for your heart or other medical reasons, talk to your healthcare provider about stopping it.  Follow-up " care  Follow up with your healthcare provider or as advised by our staff.  When to seek medical advice  Call your healthcare provider if any of the following occur:  · Stomach pain gets worse or moves to the lower right abdomen (appendix area)  · Chest pain appears or gets worse, or spreads to the back, neck, shoulder, or arm  · Frequent vomiting (cant keep down liquids)  · Blood in the stool or vomit (red or black in color)  · Feeling weak or dizzy  · Fever of 100.4ºF (38ºC) or higher, or as directed by your healthcare provider  Date Last Reviewed: 6/23/2015 © 2000-2017 [employer]. [address] [place] [id number]. All rights reserved. This information is not intended as a substitute for professional medical care. Always follow your healthcare professional's instructions.        Understanding Rectal Bleeding    Rectal bleeding is when blood passes through your rectum and anus. It can happen with or without a bowel movement. Rectal bleeding may be a sign of a serious problem in your rectum, colon, or upper GI tract. Call your healthcare provider right away if you have any rectal bleeding.  The GI Tract  The gastrointestinal (GI) tract includes the mouth, esophagus, stomach, small intestine, large intestine (colon), rectum, and anus. The food you eat is digested as it passes through the GI tract. Solid waste leaves the body through the rectum.   Rectal bleeding and GI problems  The cause of rectal bleeding may be found in any region of the GI tract. The colon or rectum may be the site of your bleeding problem. Or, bleeding may be due to problems farther up the GI tract, such as in the small intestine, duodenum, or stomach.  Causes of rectal bleeding  Rectal bleeding causes include the following:  · Hemorrhoids (swollen veins in the rectum and anus)  · Fissures (tears in or near the anus)  · Diverticulosis (inflamed pockets in the colon wall)  · Infection  · Ischemia (low blood  flow)  · Radiation damage  · Inflammatory bowel disease (Crohn's disease or ulcerative colitis)  · Ulcers in the upper GI tract and inflammation of the large intestine  · Abnormal tissue growths (tumors or polyps) in the GI tract  · A bulging rectum (also called a rectal prolapse)  · Abnormal blood vessels in the small intestine or in the colon  Common symptoms  Common symptoms include the following:  · Rectal pain, itching, or soreness  · Belly pain or epigastric pain  · Minor occasional drops of blood that appear on the stool or toilet paper, to greater amounts of stool that appear black or tarry   Rectal bleeding can also happen without pain.  Date Last Reviewed: 7/1/2016  © 3148-5843 [employer]. [address] [place] [address], [place] [address]. All rights reserved. This information is not intended as a substitute for professional medical care. Always follow your healthcare professional's instructions.

## 2020-10-25 NOTE — DISCHARGE INSTRUCTIONS
Exact cause of your symptoms is unclear at this time.  Emergency department testing is within acceptable ranges and does not suggest an acute heart attack as the cause of your symptoms.  Your symptoms may be related to acid reflux or gastritis. Avoid caffeine, alcohol and spicy foods. Start small amounts of a bland diet and advance as tolerated. Return to ER for fever, chest pain, bloody vomit, or vomit that looks like coffee grounds, or black or bloody stools.     It is very important that you attend your scheduled follow-up with Gastroenterology and Cardiology.  You should also contact your primary physician for follow-up as well.    Thank you for coming to our Emergency Department today. It is important to remember that some problems are difficult to diagnose and may not be found during your first visit. Be sure to follow up with your primary care doctor and review any labs/imaging that was performed with them. If you do not have a primary care doctor, you may contact the one listed on your discharge paperwork or you may also call the [facility] Appointment Desk at [phone] to schedule an appointment with one.     All medications may potentially have side effects and it is impossible to predict which medications may give you side effects. If you feel that you are having a negative effect of any medication you should immediately stop taking them and seek medical attention.    Return to the ER with any questions/concerns, new/concerning symptoms, worsening or failure to improve. Do not drive or make any important decisions for 24 hours if you have received any pain medications, sedatives or mood altering drugs during your ER visit.

## 2020-10-25 NOTE — ED PROVIDER NOTES
"Encounter Date: 10/25/2020    SCRIBE #1 NOTE: I, Josefina Genao, am scribing for, and in the presence of,  Dr. Ramsey. I have scribed the following portions of the note - Other sections scribed: HPI, ROS, PE.       History     Chief Complaint   Patient presents with    Chest Pain     Chest pain radiating to L shoulder and diffuse abd pain x "months", also noticed one episode of blood in stool when he wiped on Tuesday.     Braeden Rowland is a 54 y.o. male who presents to the ED complaining of intermittent left sided chest pain that radiates into left shoulder that began this morning. Reports LUQ abdominal pain. States pain worsened when he bent over. No attempted treatment. Denies nausea, vomiting, diarrhea, and dysuria. Reports two episodes of blood in stool that he noticed when he wiped. States his doctor took stool sample and scheduled colonoscopy on 11/11/2020. States he was seen by cardiologist on 10/8/2020 and has follow-up appointment with cardiologist on 10/30/2020. PMHx of HTN, HLD and GERD. Has been taking his medications as prescribed. No known allergies.    The history is provided by the patient. No  was used.     Review of patient's allergies indicates:  No Known Allergies  Past Medical History:   Diagnosis Date    Chest pain 10/2019    Cigarette smoker     GERD (gastroesophageal reflux disease)     Hyperlipidemia     used to take medication     Past Surgical History:   Procedure Laterality Date    NO PAST SURGERIES  03/06/2020     No family history on file.  Social History     Tobacco Use    Smoking status: Former Smoker     Packs/day: 0.50     Types: Cigarettes    Smokeless tobacco: Never Used   Substance Use Topics    Alcohol use: Yes     Alcohol/week: 21.0 standard drinks     Types: 21 Cans of beer per week     Comment: 3/6/20: "about 3, everyday after I get off work"    Drug use: No     Review of Systems   Constitutional: Negative for fever.   HENT: Negative for sore " throat.    Respiratory: Negative for shortness of breath.    Cardiovascular: Positive for chest pain.   Gastrointestinal: Positive for abdominal pain and blood in stool. Negative for diarrhea, nausea and vomiting.   Genitourinary: Negative for dysuria.   Musculoskeletal: Positive for arthralgias. Negative for back pain.   Skin: Negative for rash.   Neurological: Negative for weakness and numbness.   Hematological: Does not bruise/bleed easily.   All other systems reviewed and are negative.      Physical Exam     Initial Vitals   BP Pulse Resp Temp SpO2   10/25/20 1616 10/25/20 1615 10/25/20 1615 10/25/20 1615 10/25/20 1615   (!) 131/90 77 17 98.1 °F (36.7 °C) 98 %      MAP       --                Physical Exam    Nursing note and vitals reviewed.  Constitutional: He is not diaphoretic. No distress.   HENT:   Head: Normocephalic and atraumatic.   Mouth/Throat: Oropharynx is clear and moist.   Eyes: Conjunctivae and EOM are normal. No scleral icterus.   Neck: Normal range of motion. Neck supple. No JVD present.   Cardiovascular: Normal rate, regular rhythm and intact distal pulses.   Pulmonary/Chest: Breath sounds normal. No stridor. No respiratory distress.   Abdominal: Soft. Bowel sounds are normal. He exhibits no distension. There is no abdominal tenderness.   Musculoskeletal: Normal range of motion. No tenderness or edema.   Neurological: He is alert. He has normal strength. No cranial nerve deficit or sensory deficit. GCS score is 15. GCS eye subscore is 4. GCS verbal subscore is 5. GCS motor subscore is 6.   Skin: Skin is warm and dry. No rash noted.   Psychiatric: He has a normal mood and affect.         ED Course   Procedures  Labs Reviewed   TROPONIN ISTAT   POCT CBC   POCT CMP   POCT PROTIME-INR   POCT TROPONIN   ISTAT PROCEDURE   POCT CMP              Imaging Results          X-Ray Chest AP Portable (Final result)  Result time 10/25/20 17:03:28    Final result by Gallo Marsh MD (10/25/20 17:03:28)                  Impression:      No acute cardiopulmonary process identified.      Electronically signed by: Gallo Marsh MD  Date:    10/25/2020  Time:    17:03             Narrative:    EXAMINATION:  XR CHEST AP PORTABLE    CLINICAL HISTORY:  Chest Pain;    TECHNIQUE:  Single frontal view of the chest was performed.    COMPARISON:  09/16/2020.    FINDINGS:  Cardiac silhouette is normal in size.  Lungs are symmetrically expanded.  No evidence of focal consolidative process, pneumothorax, or significant pleural effusion.  No acute osseous abnormality identified.                                 Medical Decision Making:   History:   Old Medical Records: I decided to obtain old medical records.  Differential Diagnosis:   Includes but not limited to: GERD, gastritis, gastroenteritis, ACS, musculoskeletal pain, hepatobiliary disease  Clinical Tests:   Lab Tests: Ordered and Reviewed  Radiological Study: Ordered and Reviewed  Medical Tests: Ordered and Reviewed  ED Management:  Patient afebrile in no acute distress at time history and physical.  EKG does not suggest STEMI.  Vital signs within acceptable ranges.  Chest x-ray does not suggest CHF or pneumonia.  Labs without leukocytosis or significant electrolyte abnormality.  Troponin is within normal ranges.  Patient has remained clinically stable in the emergency department.  Patient has had a recent stress test that showed no abnormal myocardial perfusion.  I have low clinical suspicion of ACS in this patient.  Patient reports he is scheduled to see Cardiology this week.  Patient is clinically stable to follow up with Cardiology as an outpatient. counseled on supportive care, appropriate medication usage, concerning symptoms for which to return to ER and the importance of follow up. Understanding and agreement with treatment plan was expressed.   This chart was completed using dictation software, as a result there may be some transcription errors.             Scribe  Attestation:   Scribe #1: I performed the above scribed service and the documentation accurately describes the services I performed. I attest to the accuracy of the note.    I, Jose F Ramsey , personally performed the services described in this documentation. All medical record entries made by the scribe were at my direction and in my presence.  I have reviewed the chart and agree that the record reflects my personal performance and is accurate and complete.                    Clinical Impression:       ICD-10-CM ICD-9-CM   1. Chest pain  R07.9 786.50                      Disposition:   Disposition: Discharged  Condition: Stable     ED Disposition Condition    Discharge Stable        ED Prescriptions     None        Follow-up Information     Follow up With Specialties Details Why Contact Info    Your Primary Physician  Schedule an appointment as soon as possible for a visit   Make an appointment to see your primary care physician as soon as possible for follow-up    Misael Landry MD Cardiology, INTERVENTIONAL CARDIOLOGY Schedule an appointment as soon as possible for a visit   [address]  [address]  [facility] [address]  [phone]                                         Jose F Ramsey MD  10/27/20 0437

## 2020-11-13 ENCOUNTER — CLINICAL SUPPORT (OUTPATIENT)
Dept: REHABILITATION | Facility: HOSPITAL | Age: 55
End: 2020-11-13
Attending: NURSE PRACTITIONER
Payer: MEDICAID

## 2020-11-13 DIAGNOSIS — R29.3 POSTURAL IMBALANCE: ICD-10-CM

## 2020-11-13 DIAGNOSIS — M25.512 ACUTE PAIN OF LEFT SHOULDER: ICD-10-CM

## 2020-11-13 PROCEDURE — 97110 THERAPEUTIC EXERCISES: CPT | Mod: PN

## 2020-11-13 PROCEDURE — 97161 PT EVAL LOW COMPLEX 20 MIN: CPT | Mod: PN

## 2020-11-13 NOTE — PLAN OF CARE
[facility]  Physical Therapy Initial Evaluation    Date: 11/13/2020   Name: Braeden Rowland  Clinic Number: [id number]    Therapy Diagnosis:   Encounter Diagnoses   Name Primary?    Acute pain of left shoulder     Postural imbalance      Physician: Josefina Rushing NP-C    Physician Orders: PT Eval and Treat   Medical Diagnosis from Referral: Other shoulder lesions, left shoulder  Evaluation Date: 11/13/2020  Authorization Period Expiration: 12/13/20  Plan of Care Expiration: 2/13/21  Visit # / Visits authorized: 1/ 1    Time In: 1233  Time Out: 1313  Total Appointment Time (timed & untimed codes): 40 minutes    Precautions: HTN    Subjective   Date of onset: insidious about 1 month ago  History of current condition - Braeden reports: on and off L shoulder pain since the beginning of the year. He feels work duties have made his symptoms worse. The past month, his symptoms have slightly improved. He was working out in March 2020 but stopped due to the pandemic and was having shoulder pain with overhead exercises. Pt is R hand dominant. He sleeps on his side and back.      Medical History:   Past Medical History:   Diagnosis Date    Chest pain 10/2019    Cigarette smoker     GERD (gastroesophageal reflux disease)     Hyperlipidemia     used to take medication       Surgical History:   Braeden Rowland  has a past surgical history that includes No past surgeries (03/06/2020).    Medications:   Braeden WEST has a current medication list which includes the following prescription(s): acetaminophen, amlodipine, aspirin, atorvastatin, diclofenac sodium, ibuprofen, meloxicam, pantoprazole, ranitidine, tizanidine, and meclizine.    Allergies:   Review of patient's allergies indicates:  No Known Allergies     Imaging: pt reports 1-2 months ago that did not show a fracture but had arthritis    Prior Therapy: none  Social History: lives with wife  Physical activity: was working out March 2020 but  stopped due to pandemic  Occupation: - manual labor  Prior Level of Function: independent with all ADLs, currently driving  Current Level of Function: difficulty with driving, overhead reaching and lifting, work duties    Pain:  Current 1/10, worst 5/10, best 0/10   Location: L shoulder   Description: Aching and Tingling  Aggravating Factors: Lifting  Easing Factors: heating pad and topical cream   Radicular symptoms: none    Patients goals: decrease pain to get back to working out    Objective     Observation: pleasant and cooperative    Posture: forward head, rounded shoulders    Cervical Range of Motion: WNL in all planes    Passive Range of Motion (degrees):   Shoulder Right Left   Flexion 170 170   Abduction 170 170   ER at 45 80 80   IR at 45 55 55      Active Range of Motion (degrees):   Shoulder Right Left   Flexion 170 170   Abduction 170 170   ER at 0 70 70     Upper Extremity Strength  (R) UE  (L) UE    Elbow flexion: 5/5 Elbow flexion: 5/5   Elbow extension: 5/5 Elbow extension: 5/5   Shoulder flexion: 5/5 Shoulder flexion: 5/5   Shoulder Abduction: 5/5 Shoulder abduction: 5/5   Shoulder ER 5/5 Shoulder ER 5/5   Shoulder IR 5/5 Shoulder IR 5/5   Lower Trap 5/5 Lower Trap 4+/5   Middle Trap 5/5 Middle Trap 4+/5   Rhomboids 5/5 Rhomboids 5/5     Special Tests:    Impingement   Left   [place] negative     Rotator Cuff:     Left   Empty Can Test Positive for AC joint pain   Full Can Test Positive for relief     Labrum:     Left   [place]'s Test negative       AC Joint/Biceps:     Left   AC Joint Compression Test Positive for mild pain   Speed's test Positive for biceps pain     Joint Mobility:    Cervicothoracic: hypomobility    Palpation: L AC joint and biceps tendon TTP    Flexibility: B mild pec tightness    Limitation/Restriction for FOTO shoulder Survey    Therapist reviewed FOTO scores for Braeden Rowland on 11/13/2020.   FOTO documents entered into EPIC - see Media  section.    Limitation Score: 0%     TREATMENT   Treatment Time In: 1300  Treatment Time Out: 1313  Total Treatment time (time-based codes) separate from Evaluation: 13 minutes    Braeden received therapeutic exercises to develop strength, endurance, ROM, flexibility, posture and core stabilization for 13 minutes including:    Supine pec stretch w hands behind head and vertical towel roll x 3 min  Scapular retractions x 10  B sh horiz abd w GTB x 10  Rows w GTB x 10     Home Exercises and Patient Education Provided    Education provided:   - importance of performing HEP to tolerance  - shoulder anatomy    Written Home Exercises Provided: yes.  Exercises were reviewed and Braeden was able to demonstrate them prior to the end of the session.  Braeden demonstrated good  understanding of the education provided.     See EMR under Patient Instructions for exercises provided 11/13/2020.    Assessment   Braeden WEST is a 54 y.o. male referred to outpatient Physical Therapy with a medical diagnosis of Other shoulder lesions, left shoulder. Patient presents with reports of L shoulder pain, postural imbalance, muscle tightness, UE weakness, and decreased functional use of UE. Special tests indicate possible AC joint and biceps pathology. Pt with good response to exercise program. He requires heavy manual and verbal cueing for proper scapular retraction with therapeutic exercises.     Patient prognosis is Good.   Patientt will benefit from skilled outpatient Physical Therapy to address the deficits stated above and in the chart below, provide patient /family education, and to maximize patientt's level of independence.     Plan of care discussed with patient: Yes  Patient's spiritual, cultural and educational needs considered and patient is agreeable to the plan of care and goals as stated below:     Anticipated Barriers for therapy: none    Medical Necessity is demonstrated by the following  History  Co-morbidities and personal  factors that may impact the plan of care Co-morbidities:   HTN    Personal Factors:   lifestyle     moderate   Examination  Body Structures and Functions, activity limitations and participation restrictions that may impact the plan of care Body Regions:   upper extremities  trunk    Body Systems:    gross symmetry  ROM  strength  motor control  motor learning    Participation Restrictions:   ADLs, IADLs, domestic and work duties    Activity limitations:   Learning and applying knowledge  no deficits    General Tasks and Commands  no deficits    Communication  no deficits    Mobility  lifting and carrying objects  driving (bike, car, motorcycle)    Self care  no deficits    Domestic Life  shopping  cooking  doing house work (cleaning house, washing dishes, laundry)  assisting others    Interactions/Relationships  no deficits    Life Areas  employment    Community and Social Life  community life  recreation and leisure         high   Clinical Presentation stable and uncomplicated low   Decision Making/ Complexity Score: low     Medical necessity is demonstrated by the following IMPAIRMENTS/PROBLEM LIST:   1) Increase in pain level limiting function   2) UE weakness   3) Difficulty lifting overhead   4) Lack of HEP    GOALS: Short Term Goals:  6 weeks  1. Report decreased L shoulder pain  <  / =  3/10 at worst to increase tolerance for driving.   2. Pt will be able to tolerate multi-directional UE strengthening without pain to improve functional use of UEs.  3. Pt will report 50% improvement in ability to lift overhead without pain since start of care to indicate improved functional use of UE at work.   4. Pt to tolerate HEP to improve ROM and independence with ADL's.    Long Term Goals: 12 weeks  1. Report decreased L shoulder pain  <  / =  1/10 at worst to increase tolerance for driving.   2. Pt will be able to perform 2 x 10 shoulder flexion with 2 lbs to indicate improved strength in B UEs.  3. Pt will report 80%  improvement in ability to lift overhead without pain since start of care to indicate improved functional use of UE at work.   4. Pt to be Independent with HEP to improve ROM and independence with ADL's.    Plan   Plan of care Certification: 11/13/2020 to 2/13/21.    Outpatient Physical Therapy 1 times weekly for 12 weeks to include the following interventions: Manual Therapy, Moist Heat/ Ice, Neuromuscular Re-ed, Patient Education, Therapeutic Activites and Therapeutic Exercise.     Melani Jesus, PT

## 2020-11-20 ENCOUNTER — CLINICAL SUPPORT (OUTPATIENT)
Dept: REHABILITATION | Facility: HOSPITAL | Age: 55
End: 2020-11-20
Attending: NURSE PRACTITIONER
Payer: MEDICAID

## 2020-11-20 DIAGNOSIS — M25.512 ACUTE PAIN OF LEFT SHOULDER: ICD-10-CM

## 2020-11-20 DIAGNOSIS — R29.3 POSTURAL IMBALANCE: ICD-10-CM

## 2020-11-20 PROCEDURE — 97110 THERAPEUTIC EXERCISES: CPT | Mod: PN,CQ

## 2020-11-28 ENCOUNTER — HOSPITAL ENCOUNTER (EMERGENCY)
Facility: HOSPITAL | Age: 55
Discharge: HOME | End: 2020-11-28
Attending: EMERGENCY MEDICINE
Payer: MEDICAID

## 2020-11-28 VITALS
TEMPERATURE: 98 F | RESPIRATION RATE: 16 BRPM | SYSTOLIC BLOOD PRESSURE: 140 MMHG | BODY MASS INDEX: 33.47 KG/M2 | HEART RATE: 91 BPM | DIASTOLIC BLOOD PRESSURE: 83 MMHG | OXYGEN SATURATION: 97 % | WEIGHT: 240 LBS

## 2020-11-28 DIAGNOSIS — F10.120 HANGOVER EFFECT, UNCOMPLICATED: Primary | ICD-10-CM

## 2020-11-28 DIAGNOSIS — R51.9 NONINTRACTABLE HEADACHE, UNSPECIFIED CHRONICITY PATTERN, UNSPECIFIED HEADACHE TYPE: ICD-10-CM

## 2020-11-28 DIAGNOSIS — R42 DIZZINESS: ICD-10-CM

## 2020-11-28 PROCEDURE — 99282 EMERGENCY DEPT VISIT SF MDM: CPT | Mod: ER

## 2020-11-28 NOTE — ED NOTES
Orthostatics completed. Pt tolerated   Lying: BP: 139/81 ID: [id number]    Sitting: BP: 144/86 ID: [id number]    Standing: BP: 144/89 ID: [id number]

## 2020-11-28 NOTE — ED PROVIDER NOTES
"Encounter Date: 11/28/2020    SCRIBE #1 NOTE: I, Cat Robison, am scribing for, and in the presence of,  Simon Schmitt MD. I have scribed the following portions of the note - Other sections scribed: HPI, ROS, PE.       History     Chief Complaint   Patient presents with    Headache     Pt states," I think I have a hang over. i drank many deers last night. Today I think I am dehydrated and I am dizzy on and off."    Dizziness     This is a 54 y/o male with a past medical history of hypertension and hyperlipidemia who presents to the ED with complaints of dizziness and headache that began this morning and has now resolved. Patient states these symptoms lasted for a few minutes. He reports drinking 13 bottles of beer last night. Denies recent falls or head injury. Denies history of diabetes. Denies abdominal pain, fever, cough, chills, chest pain, palpitations, slurred speech, numbness, weakness, tingling, or facial drooping.     The history is provided by the patient. No  was used.     Review of patient's allergies indicates:  No Known Allergies  Past Medical History:   Diagnosis Date    Chest pain 10/2019    Cigarette smoker     GERD (gastroesophageal reflux disease)     Hyperlipidemia     used to take medication     Past Surgical History:   Procedure Laterality Date    NO PAST SURGERIES  03/06/2020     History reviewed. No pertinent family history.  Social History     Tobacco Use    Smoking status: Former Smoker     Packs/day: 0.50     Types: Cigarettes    Smokeless tobacco: Never Used   Substance Use Topics    Alcohol use: Yes     Alcohol/week: 21.0 standard drinks     Types: 21 Cans of beer per week     Comment: 3/6/20: "about 3, everyday after I get off work"    Drug use: No     Review of Systems   Constitutional: Negative for chills and fever.   HENT: Negative for sore throat.    Eyes: Negative for visual disturbance.   Respiratory: Negative for cough and shortness of breath. "    Cardiovascular: Negative for chest pain and palpitations.   Gastrointestinal: Negative for abdominal pain.   Genitourinary: Negative for dysuria.   Musculoskeletal: Negative for back pain.   Skin: Negative for rash.   Neurological: Positive for dizziness (Resolved.) and headaches (Resolved.). Negative for facial asymmetry, speech difficulty, weakness and numbness.        Negative for paresthesia. Negative for slurred speech. Negative for facial drooping.       Physical Exam     Initial Vitals [11/28/20 1004]   BP Pulse Resp Temp SpO2   (!) 138/91 91 16 98 °F (36.7 °C) 98 %      MAP       --         Physical Exam    Nursing note and vitals reviewed.  Constitutional: He appears well-developed and well-nourished.   HENT:   Head: Normocephalic and atraumatic.   Right Ear: External ear normal.   Left Ear: External ear normal.   Nose: Nose normal.   Eyes: Conjunctivae and EOM are normal.   Neck: Normal range of motion. Neck supple. No thyromegaly present. No JVD present.   Cardiovascular: Normal rate and regular rhythm. Exam reveals no gallop and no friction rub.    No murmur heard.  Pulmonary/Chest: Breath sounds normal. No respiratory distress.   Abdominal: Soft. Bowel sounds are normal. There is no abdominal tenderness.   Musculoskeletal: Normal range of motion. No tenderness or edema.   Neurological: He is alert and oriented to person, place, and time. He has normal strength. GCS score is 15. GCS eye subscore is 4. GCS verbal subscore is 5. GCS motor subscore is 6.   Skin: Skin is warm and dry. Capillary refill takes less than 2 seconds.   Psychiatric: He has a normal mood and affect.         ED Course   Procedures  Labs Reviewed - No data to display      patient woke up this morning with a headache and feel little dizzy and only lasted for few minutes.  Now completely asymptomatic.  Patient states he drank 12 beers last night which is much more than he normally does.  Feel the patient had hangover symptoms.  He is  asymptomatic.  He has normal neurologic exam.  There was no chest pain or pain with breathing or shortness of breath.  There is no other stroke-like symptoms.  The dizziness is described as just feeling a little faint or weak.  His vital signs are stable.  He is not orthostatic.  No abdominal tenderness.  Abdominal pain.  Normal neurologic exam.  Patient stable discharge with reassurance.  Hydrate well and rest today.  Imaging Results    None          Medical Decision Making:   History:   Old Medical Records: I decided to obtain old medical records.            Scribe Attestation:   Scribe #1: I performed the above scribed service and the documentation accurately describes the services I performed. I attest to the accuracy of the note.    I, Simon Schmitt, personally performed the services described in this documentation. All medical record entries made by the scribe were at my direction and in my presence.  I have reviewed the chart and agree that the record reflects my personal performance and is accurate and complete                      Clinical Impression:       ICD-10-CM ICD-9-CM   1. Hangover effect, uncomplicated  F10.120 305.00   2. Nonintractable headache, unspecified chronicity pattern, unspecified headache type  R51.9 784.0   3. Dizziness  R42 780.4                          ED Disposition Condition    Discharge Stable        ED Prescriptions     None        Follow-up Information     Follow up With Specialties Details Why Contact Info    [facility] Emergency [facility]  As needed, If symptoms return or new symptoms develop [address] [facility] [id number] [phone]                                       Simon Schmitt MD  11/28/20 5139

## 2020-12-01 NOTE — PROGRESS NOTES
Physical Therapy Treatment Note     Name: Breaden Rowland  [facility] Number: [id number]    Therapy Diagnosis:   Encounter Diagnoses   Name Primary?    Acute pain of left shoulder Yes    Postural imbalance      Physician: Josefina Rushing NP-C    Visit Date: 12/2/2020    Physician Orders: PT Eval and Treat   Medical Diagnosis from Referral: Other shoulder lesions, left shoulder  Evaluation Date: 11/13/2020  Authorization Period Expiration: 12/13/20  Plan of Care Expiration: 2/13/21  Visit # / Visits authorized: 2/ 5     Time In: 03:45p  Time Out: 04:30p  Total Time: 45  Total Appointment Time (timed & untimed codes): 45 minutes     Precautions: HTN    Subjective     Pt reports:He's still having some pain reaching overhead. The pain comes and goes.  He was compliant with home exercise program.  Response to previous treatment: good   Functional change: ongoing    Pain: 0/10  Location: left shoulder      Objective     Braeden received therapeutic exercises to develop strength, endurance, ROM, flexibility, posture and core stabilization for 45 minutes including:    UBE  (2 fwd/2 backward)  4 minutes    Standing:  Rows with Green TB   2x15  Shoulder Extension with GTB  2x15  Shoulder IR with Green TB  2x15  Shoulder ER with Green TB  2x15  B shoulder ER w/ RTB  2x15  Wall slides + lifts    x15 reps    Supine:  Cervical Retractions   2x10  Shoulder flex with orange dowel  2x10  Pec stretch over half roll   3 minutes  Horizontal Abduction w/ YTB  2x15    Prone:  Shoulder Flexion    2x10  +Y's w/ Lift off (B)   2x10  +Abduction w/ elbow bent  2x10       Home Exercises Provided and Patient Education Provided     Education provided:   - compliance with HEP  - Possibility of DOMs    Written Home Exercises Provided: Patient instructed to cont prior HEP.  Exercises were reviewed and Braeden was able to demonstrate them prior to the end of the session.  Braeden demonstrated good  understanding of the education provided.     See EMR  under Patient Instructions for exercises provided prior visit.    Assessment   Braeden tolerated the above therex well today without any complaints of pain. Pt continues to require verbal and tactile cues in order to decrease UT activation when perform exercises. He also continues to be limited in shoulder flexion in the prone position. Prone Y's were added to today's session to target weakness in lower traps. There were no adverse effects reported with the aforementioned exercise. Post tx he was encouraged to continue HEP when not attending therapy.      Braeden is progressing well towards his goals.   Pt prognosis is Good.     Pt will continue to benefit from skilled outpatient physical therapy to address the deficits listed in the problem list box on initial evaluation, provide pt/family education and to maximize pt's level of independence in the home and community environment.     Pt's spiritual, cultural and educational needs considered and pt agreeable to plan of care and goals.     Anticipated barriers to physical therapy: none    Goals:   Short Term Goals:  6 weeks  1. Report decreased L shoulder pain  <  / =  3/10 at worst to increase tolerance for driving.   2. Pt will be able to tolerate multi-directional UE strengthening without pain to improve functional use of UEs.  3. Pt will report 50% improvement in ability to lift overhead without pain since start of care to indicate improved functional use of UE at work.   4. Pt to tolerate HEP to improve ROM and independence with ADL's.     Long Term Goals: 12 weeks  1. Report decreased L shoulder pain  <  / =  1/10 at worst to increase tolerance for driving.   2. Pt will be able to perform 2 x 10 shoulder flexion with 2 lbs to indicate improved strength in B UEs.  3. Pt will report 80% improvement in ability to lift overhead without pain since start of care to indicate improved functional use of UE at work.   4. Pt to be Independent with HEP to improve ROM and  independence with ADL's.       Plan     Continue to progress strength and ROM as tolerated by pt.  Plan of care Certification: 11/13/2020 to 2/13/21.     Outpatient Physical Therapy 1 times weekly for 12 weeks   Kelly Carrera PTA   12/02/2020

## 2020-12-02 ENCOUNTER — CLINICAL SUPPORT (OUTPATIENT)
Dept: REHABILITATION | Facility: HOSPITAL | Age: 55
End: 2020-12-02
Attending: NURSE PRACTITIONER
Payer: MEDICAID

## 2020-12-02 DIAGNOSIS — R29.3 POSTURAL IMBALANCE: ICD-10-CM

## 2020-12-02 DIAGNOSIS — M25.512 ACUTE PAIN OF LEFT SHOULDER: Primary | ICD-10-CM

## 2020-12-02 PROCEDURE — 97110 THERAPEUTIC EXERCISES: CPT | Mod: PN,CQ

## 2020-12-09 ENCOUNTER — CLINICAL SUPPORT (OUTPATIENT)
Dept: REHABILITATION | Facility: HOSPITAL | Age: 55
End: 2020-12-09
Attending: NURSE PRACTITIONER
Payer: MEDICAID

## 2020-12-09 DIAGNOSIS — R29.3 POSTURAL IMBALANCE: ICD-10-CM

## 2020-12-09 DIAGNOSIS — M25.512 ACUTE PAIN OF LEFT SHOULDER: Primary | ICD-10-CM

## 2020-12-09 PROCEDURE — 97110 THERAPEUTIC EXERCISES: CPT | Mod: PN,CQ

## 2020-12-09 NOTE — PROGRESS NOTES
"    Physical Therapy Treatment Note     Name: Braeden Rowland  [facility] Number: [id number]    Therapy Diagnosis:   Encounter Diagnoses   Name Primary?    Acute pain of left shoulder Yes    Postural imbalance        Physician: Josefina Rushing, NPKimberlynC    Visit Date: 12/9/2020    Physician Orders: PT Eval and Treat   Medical Diagnosis from Referral: Other shoulder lesions, left shoulder  Evaluation Date: 11/13/2020  Authorization Period Expiration: 12/13/20  Plan of Care Expiration: 2/13/21  Visit # / Visits authorized: 4/ 12     Time In: 4:00pm (pt arrived late)  Time Out: 4:30pm   Total Time: 30 minutes  Total Appointment Time (timed & untimed codes): 45 minutes     Precautions: HTN    Subjective     Pt reports: his shoulder has been feeling much better.   He was compliant with home exercise program.  Response to previous treatment: good   Functional change: ongoing    Pain: 0/10  Location: left shoulder      Objective     Braeden received therapeutic exercises to develop strength, endurance, ROM, flexibility, posture and core stabilization for 30 minutes including:    UBE  (2 fwd/2 backward)  4 minutes    Rows with Green TB   2x15  Shoulder Extension with GTB  2x15  Shoulder IR with Green TB  2x15/ea  Shoulder ER with Green TB  2x15/ea  +Standing Hori. abduction w/ RTB 15x  B shoulder ER w/ RTB  2x15  Wall slides + lifts    x15 reps  +Upper Trap Stretch  3x30"    NP:  Supine:  Cervical Retractions   2x10  Shoulder flex with orange dowel  2x10  Pec stretch over half roll   3 minutes  Horizontal Abduction w/ YTB  2x15  Prone:  Shoulder Flexion    2x10  +Y's w/ Lift off (B)   2x10  +Abduction w/ elbow bent  2x10       Home Exercises Provided and Patient Education Provided     Education provided:   - compliance with HEP  - Possibility of DOMs    Written Home Exercises Provided: Patient instructed to cont prior HEP.  Exercises were reviewed and Braeden was able to demonstrate them prior to the end of the session.  Braeden " demonstrated good  understanding of the education provided.     See EMR under Patient Instructions for exercises provided prior visit.    Assessment   Braeden tolerated the above therex well without any complaints of pain. Pt presents to therapy session with 0/10 pain and reports that his neck and shoulders are feeling much better. Mr. Deras continues to required verbal and tactile cues to relax shoulders while performing various tasks. Upper trap stretch and horizontal abduction added today, and there was no provocation of pain reported. Overall, pt is progressing and remains a good candidate for PT to address deficits.     Braeden is progressing well towards his goals.   Pt prognosis is Good.     Pt will continue to benefit from skilled outpatient physical therapy to address the deficits listed in the problem list box on initial evaluation, provide pt/family education and to maximize pt's level of independence in the home and community environment.     Pt's spiritual, cultural and educational needs considered and pt agreeable to plan of care and goals.     Anticipated barriers to physical therapy: none    Goals:   Short Term Goals:  6 weeks  1. Report decreased L shoulder pain  <  / =  3/10 at worst to increase tolerance for driving.   2. Pt will be able to tolerate multi-directional UE strengthening without pain to improve functional use of UEs.  3. Pt will report 50% improvement in ability to lift overhead without pain since start of care to indicate improved functional use of UE at work.   4. Pt to tolerate HEP to improve ROM and independence with ADL's.     Long Term Goals: 12 weeks  1. Report decreased L shoulder pain  <  / =  1/10 at worst to increase tolerance for driving.   2. Pt will be able to perform 2 x 10 shoulder flexion with 2 lbs to indicate improved strength in B UEs.  3. Pt will report 80% improvement in ability to lift overhead without pain since start of care to indicate improved functional use  of UE at work.   4. Pt to be Independent with HEP to improve ROM and independence with ADL's.       Plan     Continue to progress strength and ROM as tolerated by pt.  Plan of care Certification: 11/13/2020 to 2/13/21.     Outpatient Physical Therapy 1 times weekly for 12 weeks   Kelly Carrera, PTA   12/09/2020

## 2021-01-12 ENCOUNTER — CLINICAL SUPPORT (OUTPATIENT)
Dept: REHABILITATION | Facility: HOSPITAL | Age: 56
End: 2021-01-12
Attending: NURSE PRACTITIONER
Payer: MEDICAID

## 2021-01-12 DIAGNOSIS — M25.512 ACUTE PAIN OF LEFT SHOULDER: Primary | ICD-10-CM

## 2021-01-12 DIAGNOSIS — R29.3 POSTURAL IMBALANCE: ICD-10-CM

## 2021-01-12 PROCEDURE — 97110 THERAPEUTIC EXERCISES: CPT | Mod: PN

## 2021-02-21 ENCOUNTER — HOSPITAL ENCOUNTER (EMERGENCY)
Facility: HOSPITAL | Age: 56
Discharge: HOME | End: 2021-02-21
Attending: EMERGENCY MEDICINE
Payer: MEDICAID

## 2021-02-21 VITALS
DIASTOLIC BLOOD PRESSURE: 80 MMHG | HEART RATE: 85 BPM | OXYGEN SATURATION: 98 % | WEIGHT: 220 LBS | HEIGHT: 71 IN | RESPIRATION RATE: 18 BRPM | SYSTOLIC BLOOD PRESSURE: 138 MMHG | BODY MASS INDEX: 30.8 KG/M2 | TEMPERATURE: 98 F

## 2021-02-21 DIAGNOSIS — I10 HYPERTENSION, UNSPECIFIED TYPE: ICD-10-CM

## 2021-02-21 DIAGNOSIS — R00.2 PALPITATIONS: Primary | ICD-10-CM

## 2021-02-21 LAB
ALBUMIN SERPL-MCNC: 3.7 G/DL (ref 3.3–5.5)
ALP SERPL-CCNC: 86 U/L (ref 42–141)
BILIRUB SERPL-MCNC: 0.5 MG/DL (ref 0.2–1.6)
BUN SERPL-MCNC: 11 MG/DL (ref 7–22)
CALCIUM SERPL-MCNC: 9.4 MG/DL (ref 8–10.3)
CHLORIDE SERPL-SCNC: 102 MMOL/L (ref 98–108)
CREAT SERPL-MCNC: 1.1 MG/DL (ref 0.6–1.2)
GLUCOSE SERPL-MCNC: 138 MG/DL (ref 73–118)
POC ALT (SGPT): 26 U/L (ref 10–47)
POC AST (SGOT): 27 U/L (ref 11–38)
POC TCO2: 30 MMOL/L (ref 18–33)
POTASSIUM BLD-SCNC: 4 MMOL/L (ref 3.6–5.1)
PROTEIN, POC: 8.1 G/DL (ref 6.4–8.1)
SODIUM BLD-SCNC: 141 MMOL/L (ref 128–145)

## 2021-02-21 PROCEDURE — 93010 ELECTROCARDIOGRAM REPORT: CPT | Mod: ,,, | Performed by: INTERNAL MEDICINE

## 2021-02-21 PROCEDURE — 85025 COMPLETE CBC W/AUTO DIFF WBC: CPT | Mod: ER

## 2021-02-21 PROCEDURE — 80053 COMPREHEN METABOLIC PANEL: CPT | Mod: ER

## 2021-02-21 PROCEDURE — 99285 EMERGENCY DEPT VISIT HI MDM: CPT | Mod: 25,ER

## 2021-02-21 PROCEDURE — 93005 ELECTROCARDIOGRAM TRACING: CPT | Mod: ER

## 2021-02-21 PROCEDURE — 93010 EKG 12-LEAD: ICD-10-PCS | Mod: ,,, | Performed by: INTERNAL MEDICINE

## 2021-02-21 PROCEDURE — 25000003 PHARM REV CODE 250: Mod: ER | Performed by: EMERGENCY MEDICINE

## 2021-02-21 RX ADMIN — SODIUM CHLORIDE 1000 ML: 0.9 INJECTION, SOLUTION INTRAVENOUS at 04:02

## 2021-04-22 ENCOUNTER — OFFICE VISIT (OUTPATIENT)
Dept: CARDIOLOGY | Facility: CLINIC | Age: 56
End: 2021-04-22
Payer: MEDICAID

## 2021-04-22 VITALS
DIASTOLIC BLOOD PRESSURE: 70 MMHG | RESPIRATION RATE: 15 BRPM | OXYGEN SATURATION: 98 % | BODY MASS INDEX: 32.44 KG/M2 | HEIGHT: 71 IN | SYSTOLIC BLOOD PRESSURE: 120 MMHG | WEIGHT: 231.69 LBS | HEART RATE: 89 BPM

## 2021-04-22 DIAGNOSIS — I10 ESSENTIAL HYPERTENSION: Primary | ICD-10-CM

## 2021-04-22 DIAGNOSIS — R00.2 PALPITATIONS: ICD-10-CM

## 2021-04-22 DIAGNOSIS — E78.2 MIXED HYPERLIPIDEMIA: ICD-10-CM

## 2021-04-22 DIAGNOSIS — E66.9 NON MORBID OBESITY, UNSPECIFIED OBESITY TYPE: ICD-10-CM

## 2021-04-22 DIAGNOSIS — Z87.891 FORMER SMOKER: ICD-10-CM

## 2021-04-22 DIAGNOSIS — R94.31 ABNORMAL EKG: ICD-10-CM

## 2021-04-22 DIAGNOSIS — R00.2 HEART PALPITATIONS: ICD-10-CM

## 2021-04-22 PROCEDURE — 99214 PR OFFICE/OUTPT VISIT, EST, LEVL IV, 30-39 MIN: ICD-10-PCS | Mod: S$PBB,,, | Performed by: INTERNAL MEDICINE

## 2021-04-22 PROCEDURE — 99214 OFFICE O/P EST MOD 30 MIN: CPT | Mod: S$PBB,,, | Performed by: INTERNAL MEDICINE

## 2021-04-22 PROCEDURE — 99214 OFFICE O/P EST MOD 30 MIN: CPT | Mod: PBBFAC | Performed by: INTERNAL MEDICINE

## 2021-04-22 PROCEDURE — 99999 PR PBB SHADOW E&M-EST. PATIENT-LVL IV: ICD-10-PCS | Mod: PBBFAC,,, | Performed by: INTERNAL MEDICINE

## 2021-04-22 PROCEDURE — 99999 PR PBB SHADOW E&M-EST. PATIENT-LVL IV: CPT | Mod: PBBFAC,,, | Performed by: INTERNAL MEDICINE

## 2021-08-23 ENCOUNTER — HOSPITAL ENCOUNTER (EMERGENCY)
Facility: HOSPITAL | Age: 56
Discharge: HOME | End: 2021-08-23
Attending: INTERNAL MEDICINE
Payer: MEDICAID

## 2021-08-23 VITALS
BODY MASS INDEX: 32.2 KG/M2 | DIASTOLIC BLOOD PRESSURE: 71 MMHG | SYSTOLIC BLOOD PRESSURE: 119 MMHG | RESPIRATION RATE: 18 BRPM | HEIGHT: 71 IN | WEIGHT: 230 LBS | OXYGEN SATURATION: 100 % | TEMPERATURE: 98 F | HEART RATE: 67 BPM

## 2021-08-23 DIAGNOSIS — T14.8XXA MUSCLE STRAIN: Primary | ICD-10-CM

## 2021-08-23 DIAGNOSIS — M25.519 SHOULDER PAIN: ICD-10-CM

## 2021-08-23 PROCEDURE — 93005 ELECTROCARDIOGRAM TRACING: CPT | Mod: ER

## 2021-08-23 PROCEDURE — 99284 EMERGENCY DEPT VISIT MOD MDM: CPT | Mod: 25,ER

## 2021-08-23 PROCEDURE — 63600175 PHARM REV CODE 636 W HCPCS: Mod: ER | Performed by: INTERNAL MEDICINE

## 2021-08-23 PROCEDURE — 96372 THER/PROPH/DIAG INJ SC/IM: CPT | Mod: ER

## 2021-08-23 PROCEDURE — 81003 URINALYSIS AUTO W/O SCOPE: CPT | Mod: ER

## 2021-08-23 PROCEDURE — 93010 EKG 12-LEAD: ICD-10-PCS | Mod: ,,, | Performed by: INTERNAL MEDICINE

## 2021-08-23 PROCEDURE — 93010 ELECTROCARDIOGRAM REPORT: CPT | Mod: ,,, | Performed by: INTERNAL MEDICINE

## 2021-08-23 RX ORDER — IBUPROFEN 800 MG/1
800 TABLET ORAL EVERY 8 HOURS PRN
Qty: 30 TABLET | Refills: 0 | OUTPATIENT
Start: 2021-08-23 | End: 2021-09-15

## 2021-08-23 RX ORDER — KETOROLAC TROMETHAMINE 30 MG/ML
60 INJECTION, SOLUTION INTRAMUSCULAR; INTRAVENOUS
Status: COMPLETED | OUTPATIENT
Start: 2021-08-23 | End: 2021-08-23

## 2021-08-23 RX ADMIN — KETOROLAC TROMETHAMINE 60 MG: 30 INJECTION, SOLUTION INTRAMUSCULAR; INTRAVENOUS at 02:08

## 2021-09-15 ENCOUNTER — HOSPITAL ENCOUNTER (EMERGENCY)
Facility: HOSPITAL | Age: 56
Discharge: HOME | End: 2021-09-15
Attending: EMERGENCY MEDICINE
Payer: MEDICAID

## 2021-09-15 VITALS
SYSTOLIC BLOOD PRESSURE: 121 MMHG | TEMPERATURE: 99 F | HEART RATE: 73 BPM | DIASTOLIC BLOOD PRESSURE: 73 MMHG | BODY MASS INDEX: 30.1 KG/M2 | OXYGEN SATURATION: 98 % | RESPIRATION RATE: 16 BRPM | HEIGHT: 71 IN | WEIGHT: 215 LBS

## 2021-09-15 DIAGNOSIS — R07.9 CHEST PAIN, UNSPECIFIED TYPE: ICD-10-CM

## 2021-09-15 DIAGNOSIS — R07.89 LEFT-SIDED CHEST WALL PAIN: Primary | ICD-10-CM

## 2021-09-15 DIAGNOSIS — M25.519 SHOULDER PAIN: ICD-10-CM

## 2021-09-15 LAB
ALBUMIN SERPL-MCNC: 3.8 G/DL (ref 3.3–5.5)
ALP SERPL-CCNC: 81 U/L (ref 42–141)
BILIRUB SERPL-MCNC: 0.6 MG/DL (ref 0.2–1.6)
BILIRUBIN, POC UA: NEGATIVE
BLOOD, POC UA: ABNORMAL
BUN SERPL-MCNC: 10 MG/DL (ref 7–22)
CALCIUM SERPL-MCNC: 10 MG/DL (ref 8–10.3)
CHLORIDE SERPL-SCNC: 103 MMOL/L (ref 98–108)
CLARITY, POC UA: CLEAR
COLOR, POC UA: YELLOW
CREAT SERPL-MCNC: 0.9 MG/DL (ref 0.6–1.2)
CTP QC/QA: YES
GLUCOSE SERPL-MCNC: 136 MG/DL (ref 73–118)
GLUCOSE, POC UA: NEGATIVE
KETONES, POC UA: NEGATIVE
LEUKOCYTE EST, POC UA: NEGATIVE
NITRITE, POC UA: NEGATIVE
PH UR STRIP: 7 [PH]
POC ALT (SGPT): 31 U/L (ref 10–47)
POC AST (SGOT): 30 U/L (ref 11–38)
POC B-TYPE NATRIURETIC PEPTIDE: <5 PG/ML (ref 0–100)
POC CARDIAC TROPONIN I: 0 NG/ML
POC PTINR: 1 (ref 0.9–1.2)
POC PTWBT: 12.2 SEC (ref 9.7–14.3)
POC TCO2: 29 MMOL/L (ref 18–33)
POTASSIUM BLD-SCNC: 3.8 MMOL/L (ref 3.6–5.1)
PROTEIN, POC UA: NEGATIVE
PROTEIN, POC: 8.1 G/DL (ref 6.4–8.1)
SAMPLE: NORMAL
SAMPLE: NORMAL
SARS-COV-2 RDRP RESP QL NAA+PROBE: NEGATIVE
SODIUM BLD-SCNC: 141 MMOL/L (ref 128–145)
SPECIFIC GRAVITY, POC UA: 1.02
UROBILINOGEN, POC UA: 0.2 E.U./DL

## 2021-09-15 PROCEDURE — 25000003 PHARM REV CODE 250: Mod: ER | Performed by: EMERGENCY MEDICINE

## 2021-09-15 PROCEDURE — 83880 ASSAY OF NATRIURETIC PEPTIDE: CPT | Mod: ER

## 2021-09-15 PROCEDURE — 80053 COMPREHEN METABOLIC PANEL: CPT | Mod: ER

## 2021-09-15 PROCEDURE — 85025 COMPLETE CBC W/AUTO DIFF WBC: CPT | Mod: ER

## 2021-09-15 PROCEDURE — 85610 PROTHROMBIN TIME: CPT | Mod: ER

## 2021-09-15 PROCEDURE — 84484 ASSAY OF TROPONIN QUANT: CPT | Mod: ER

## 2021-09-15 PROCEDURE — 93005 ELECTROCARDIOGRAM TRACING: CPT | Mod: ER

## 2021-09-15 PROCEDURE — 99284 EMERGENCY DEPT VISIT MOD MDM: CPT | Mod: 25,ER

## 2021-09-15 PROCEDURE — U0002 COVID-19 LAB TEST NON-CDC: HCPCS | Mod: ER | Performed by: EMERGENCY MEDICINE

## 2021-09-15 PROCEDURE — 93010 EKG 12-LEAD: ICD-10-PCS | Mod: ,,, | Performed by: INTERNAL MEDICINE

## 2021-09-15 PROCEDURE — 81003 URINALYSIS AUTO W/O SCOPE: CPT | Mod: ER

## 2021-09-15 PROCEDURE — 93010 ELECTROCARDIOGRAM REPORT: CPT | Mod: ,,, | Performed by: INTERNAL MEDICINE

## 2021-09-15 RX ORDER — ASPIRIN 325 MG
325 TABLET ORAL
Status: COMPLETED | OUTPATIENT
Start: 2021-09-15 | End: 2021-09-15

## 2021-09-15 RX ORDER — IBUPROFEN 600 MG/1
600 TABLET ORAL EVERY 6 HOURS PRN
Qty: 20 TABLET | Refills: 0 | Status: SHIPPED | OUTPATIENT
Start: 2021-09-15 | End: 2022-04-28

## 2021-09-15 RX ORDER — DICLOFENAC SODIUM 10 MG/G
2 GEL TOPICAL 4 TIMES DAILY PRN
Qty: 1 TUBE | Refills: 0 | Status: SHIPPED | OUTPATIENT
Start: 2021-09-15 | End: 2022-04-28 | Stop reason: SDUPTHER

## 2021-09-15 RX ORDER — ACETAMINOPHEN 500 MG
1000 TABLET ORAL EVERY 6 HOURS PRN
Qty: 30 TABLET | Refills: 0 | Status: SHIPPED | OUTPATIENT
Start: 2021-09-15 | End: 2023-02-19

## 2021-09-15 RX ORDER — CYCLOBENZAPRINE HCL 10 MG
10 TABLET ORAL 3 TIMES DAILY PRN
Qty: 15 TABLET | Refills: 0 | Status: SHIPPED | OUTPATIENT
Start: 2021-09-15 | End: 2021-09-20

## 2021-09-15 RX ADMIN — ASPIRIN 325 MG ORAL TABLET 325 MG: 325 PILL ORAL at 08:09

## 2021-10-03 ENCOUNTER — HOSPITAL ENCOUNTER (EMERGENCY)
Facility: HOSPITAL | Age: 56
Discharge: HOME | End: 2021-10-03
Attending: EMERGENCY MEDICINE
Payer: MEDICAID

## 2021-10-03 VITALS
WEIGHT: 225 LBS | SYSTOLIC BLOOD PRESSURE: 132 MMHG | RESPIRATION RATE: 18 BRPM | DIASTOLIC BLOOD PRESSURE: 83 MMHG | TEMPERATURE: 98 F | HEART RATE: 75 BPM | OXYGEN SATURATION: 96 % | BODY MASS INDEX: 31.38 KG/M2

## 2021-10-03 DIAGNOSIS — E86.0 DEHYDRATION: ICD-10-CM

## 2021-10-03 DIAGNOSIS — I10 HYPERTENSION, UNSPECIFIED TYPE: Primary | ICD-10-CM

## 2021-10-03 DIAGNOSIS — I10 HTN (HYPERTENSION): ICD-10-CM

## 2021-10-03 LAB
ALBUMIN SERPL-MCNC: 3.8 G/DL (ref 3.3–5.5)
ALP SERPL-CCNC: 70 U/L (ref 42–141)
BILIRUB SERPL-MCNC: 0.7 MG/DL (ref 0.2–1.6)
BILIRUBIN, POC UA: NEGATIVE
BLOOD, POC UA: ABNORMAL
BUN SERPL-MCNC: 12 MG/DL (ref 7–22)
CALCIUM SERPL-MCNC: 9.4 MG/DL (ref 8–10.3)
CHLORIDE SERPL-SCNC: 103 MMOL/L (ref 98–108)
CLARITY, POC UA: CLEAR
COLOR, POC UA: YELLOW
CREAT SERPL-MCNC: 1.1 MG/DL (ref 0.6–1.2)
GLUCOSE SERPL-MCNC: 141 MG/DL (ref 73–118)
GLUCOSE, POC UA: NEGATIVE
KETONES, POC UA: NEGATIVE
LEUKOCYTE EST, POC UA: NEGATIVE
NITRITE, POC UA: NEGATIVE
PH UR STRIP: 5 [PH]
POC ALT (SGPT): 25 U/L (ref 10–47)
POC AST (SGOT): 29 U/L (ref 11–38)
POC CARDIAC TROPONIN I: 0 NG/ML
POC TCO2: 27 MMOL/L (ref 18–33)
POTASSIUM BLD-SCNC: 4.2 MMOL/L (ref 3.6–5.1)
PROTEIN, POC UA: NEGATIVE
PROTEIN, POC: 7.8 G/DL (ref 6.4–8.1)
SAMPLE: NORMAL
SODIUM BLD-SCNC: 142 MMOL/L (ref 128–145)
SPECIFIC GRAVITY, POC UA: >=1.03
UROBILINOGEN, POC UA: 0.2 E.U./DL

## 2021-10-03 PROCEDURE — 80053 COMPREHEN METABOLIC PANEL: CPT | Mod: ER

## 2021-10-03 PROCEDURE — 85025 COMPLETE CBC W/AUTO DIFF WBC: CPT | Mod: ER

## 2021-10-03 PROCEDURE — 96360 HYDRATION IV INFUSION INIT: CPT | Mod: ER

## 2021-10-03 PROCEDURE — 93010 ELECTROCARDIOGRAM REPORT: CPT | Mod: ,,, | Performed by: INTERNAL MEDICINE

## 2021-10-03 PROCEDURE — 93005 ELECTROCARDIOGRAM TRACING: CPT | Mod: ER

## 2021-10-03 PROCEDURE — 81003 URINALYSIS AUTO W/O SCOPE: CPT | Mod: ER

## 2021-10-03 PROCEDURE — 84484 ASSAY OF TROPONIN QUANT: CPT | Mod: ER

## 2021-10-03 PROCEDURE — 93010 EKG 12-LEAD: ICD-10-PCS | Mod: ,,, | Performed by: INTERNAL MEDICINE

## 2021-10-03 PROCEDURE — 99285 EMERGENCY DEPT VISIT HI MDM: CPT | Mod: 25,ER

## 2022-01-06 ENCOUNTER — HOSPITAL ENCOUNTER (EMERGENCY)
Facility: HOSPITAL | Age: 57
Discharge: HOME | End: 2022-01-06
Attending: EMERGENCY MEDICINE
Payer: MEDICAID

## 2022-01-06 VITALS
BODY MASS INDEX: 31.38 KG/M2 | HEART RATE: 74 BPM | WEIGHT: 225 LBS | RESPIRATION RATE: 18 BRPM | TEMPERATURE: 98 F | DIASTOLIC BLOOD PRESSURE: 83 MMHG | SYSTOLIC BLOOD PRESSURE: 142 MMHG | OXYGEN SATURATION: 99 %

## 2022-01-06 DIAGNOSIS — M25.519 SHOULDER PAIN: ICD-10-CM

## 2022-01-06 DIAGNOSIS — R07.9 CHEST PAIN: ICD-10-CM

## 2022-01-06 LAB
ALBUMIN SERPL-MCNC: 3.9 G/DL (ref 3.3–5.5)
ALP SERPL-CCNC: 82 U/L (ref 42–141)
BILIRUB SERPL-MCNC: 0.5 MG/DL (ref 0.2–1.6)
BUN SERPL-MCNC: 8 MG/DL (ref 7–22)
CALCIUM SERPL-MCNC: 9.9 MG/DL (ref 8–10.3)
CHLORIDE SERPL-SCNC: 104 MMOL/L (ref 98–108)
CREAT SERPL-MCNC: 0.8 MG/DL (ref 0.6–1.2)
GLUCOSE SERPL-MCNC: 104 MG/DL (ref 73–118)
POC ALT (SGPT): 37 U/L (ref 10–47)
POC AST (SGOT): 32 U/L (ref 11–38)
POC CARDIAC TROPONIN I: 0 NG/ML
POC CARDIAC TROPONIN I: 0.01 NG/ML
POC TCO2: 27 MMOL/L (ref 18–33)
POTASSIUM BLD-SCNC: 4 MMOL/L (ref 3.6–5.1)
PROTEIN, POC: 8 G/DL (ref 6.4–8.1)
SAMPLE: NORMAL
SAMPLE: NORMAL
SODIUM BLD-SCNC: 146 MMOL/L (ref 128–145)

## 2022-01-06 PROCEDURE — 85025 COMPLETE CBC W/AUTO DIFF WBC: CPT | Mod: ER

## 2022-01-06 PROCEDURE — 99284 EMERGENCY DEPT VISIT MOD MDM: CPT | Mod: 25,ER

## 2022-01-06 PROCEDURE — 93005 ELECTROCARDIOGRAM TRACING: CPT | Mod: ER

## 2022-01-06 PROCEDURE — 84484 ASSAY OF TROPONIN QUANT: CPT | Mod: ER

## 2022-01-06 PROCEDURE — 80053 COMPREHEN METABOLIC PANEL: CPT | Mod: ER

## 2022-01-06 RX ORDER — NAPROXEN SODIUM 220 MG/1
81 TABLET, FILM COATED ORAL DAILY
Qty: 60 TABLET | Refills: 3 | Status: SHIPPED | OUTPATIENT
Start: 2022-01-06 | End: 2023-10-30

## 2022-01-06 NOTE — ED PROVIDER NOTES
"EM PHYSICIAN NOTE  SCRIBE #1 NOTE: I, Lizette Moncada, am scribing for, and in the presence of,  Camille Ngo MD. I have scribed the following portions of the note - Other sections scribed: HPI; ROS; PE.       HPI  Braeden Rowland is a 56 y.o. male with Hx of GERD, HTN, HLD, and arthritis who presents to the ED for chief complaint of left shoulder pain radiating to left chest onset this AM. He reports an associated symptom of palpitations. Patient reports pain with movement. He states that he has had similar symptoms in the past. Patient attempted treatment for his symptoms with OTC medications with no relief. He denies history of or a family history of cardiac problems. Patient states that he has had a stress test done last year with unremarkable results. He is a former smoker and has not endorsed tobacco use for the past 3 years. Patient denies nausea, vomiting, diaphoresis, or leg swelling. No further complaints at this present time.  He has been prescribed a baby aspirin a day which he took 1 dose this morning prior to arrival.  He reports he has not been vigilant with taking his aspirin daily in the past.  Patient reports that he works out on a treadmill several times a week and that he does not experience pain with exertion.  Does not feel like the chest pain this morning was exertional.      The history was provided by the patient. No  was used.  Chief Complaint   Patient presents with    Shoulder Pain     Pt states," Pt states," I worked out about five days ago. I have pains in my left shoulder and left chest for a few days."     REVIEW of PMH, SOC History and Family History:  Past Medical History:   Diagnosis Date    Chest pain 10/2019    Cigarette smoker     GERD (gastroesophageal reflux disease)     Hyperlipidemia     used to take medication    Hypertension      Social history noncontributory  Family history noncontributory  Review of patient's allergies indicates:  No Known " Allergies        REVIEW of SYSTEMS  Source: Patient  The nurse's notes and triage vital signs were reviewed.  GENERAL/CONSTITUTIONAL: There is no report of fever, fatigue, weakness, or unexplained weight loss.  CARDIOVASCULAR: See HPI.  RESPIRATORY: There is no report of cough or SOB  GASTROINTESTINAL: There is no report of nausea, vomiting, diarrhea  MUSCULOSKELETAL: No muscle weakness or tenderness. See HPI.   SKIN AND BREASTS: There is no report of easy bruising, skin redness, skin rash.  HEMATOLOGIC/LYMPHATIC: There is no report of anemia, bleeding or clotting defects. There is no report of anticoagulant use.  The remainder of the ROS is negative.        PHYSICAL EXAMINATION    ED Triage Vitals [01/06/22 0832]   Enc Vitals Group      BP (!) 178/87      Pulse 74      Resp 16      Temp 98.3 °F (36.8 °C)      Temp src Oral      SpO2 98 %      Weight 225 lb      Height       Head Circumference       Peak Flow       Pain Score       Pain Loc       Pain Edu?       Excl. in GC?      Vital signs and Pulse Ox reviewed in clinical context. Abnormalities noted: Hypertension noted and patient will be referred to primary care physician for close follow-up  Pt's level of consciousness is alert, and the patient is in moderate distress.  Skin: warm, pink and dry.  Capillary refill is less than 2 seconds.  Mucosa:moist  Head and Neck:  Neck is supple, no bruits  Cardiac exam: RRR. +2 DP Pulses.  Plus two pulses of the lower extremities bilaterally  Pulmonary exam: unlabored and clear  Abd Exam: soft nontender   Musculoskeletal: no joint tenderness, deformity or swelling.  No edema.    Neurologic: GCS: GCS 15; 5 over 5 strength, cranial nerves intact, neck supple       Initial Impression:  Atypical chest pain  Plan:  Cardiac workup and reassess.  The patient already took aspirin prior to arrival, I will not provide him with a 2nd dose.  Camille Ngo MD, 9:43 AM 1/6/2022      Medical decision making:   Nurses notes and Vital  Signs reviewed.  Orders Placed This Encounter   Procedures    X-Ray Chest PA And Lateral    Troponin ISTAT    Troponin ISTAT    Diet NPO    Vital signs    Cardiac Monitoring - Adult    Pulse Oximetry Continuous    POCT CBC    EKG 12-lead    EKG 12-lead    Saline lock IV       ED Course as of 22 1309   Thu 2022   1006 Troponin normal [MH]   1006 My independent interpretation of the EKG is normal sinus rhythm at a rate of 74.  There is no ST segment elevation or depression.  Normal axis and normal intervals. [MH]   1007 CXR wnl   [MH]   1028 The following clinical decision rules were used in the management of this patient:  HEART SCORE  AGE: 45-65 (+1), RISK FACTORS: HTN, chol, DM, obesity, smoking, fam hist, past MI, PCI, CABG, CVA, TIA or PVD. 1-2 (+1), and Score 0-3 is LOW risk, may be discharged after 2nd negative troponin. ANY ABNORMAL Troponin is high risk., ABHIJIT Score:  ASA use last 7days = 1 and If score 0-1, and normal trop and no ST changes on EKG, risk of MACE 5% at 14 days, and EDACS EDACS: chest pain in patients under age 74 with non-ischemic EKG and 2 negative troponins.  Low risk EDACS score <16 can be discharged with follow-up. , Male (+6), Pain radiates to arm, shoulder, neck, or jaw (+5), Age 51-55 (+6) then add another +2 for every 5 years thereafter, and EDCAS today: 17     [MH]   1037 I discussed with the patient the findings of our workup thus far.  I discussed with him the clinical decision rules.  He reports that pain does not really radiate from his chest to his shoulder he feels like the pain radiates from his shoulder to his chest which would again place him in a low risk category.  No one in his family has  unexpectedly or had a heart attack in their 50s. [MH]   1300 EKG is unchanged.  The repeat troponin is 0.01 compared to the initial of 0.00. [MH]   1308 I discussed with the patient at length the findings of my workup and the fact that the troponin did go up  slightly but is still within normal range.  I offered to admit him so that he could be seen by a cardiologist today or in the morning possibly repeating his cardiac workup that he has had in the past.  Patient does not wish to be admitted.  He will be more vigilant about taking aspirin daily and will follow-up as an outpatient.  Demonstrates the capacity to understand the risks versus benefits of this approach. [MH]      ED Course User Index  [MH] Camille Ngo MD       Diagnoses that have been ruled out:   None   Diagnoses that are still under consideration:   None   Final diagnoses:   Chest pain   Shoulder pain            Follow-up Information     Follow up With Specialties Details Why Contact Info    Brett Phillips MD Cardiology, INTERVENTIONAL CARDIOLOGY In 3 days Call to schedule an appointment as soon as possible [address]  [address]  [place] [id number] [phone]          ED Prescriptions     Medication Sig Dispense Start Date End Date Auth. Provider    aspirin 81 MG Chew Take 1 tablet (81 mg total) by mouth once daily. 60 tablet 1/6/2022 9/3/2022 Camille Ngo MD          This note was created using Dictation Software.  This program may occasionally misinterpret certain words and phrases.      Scribe Attestation:   Scribe #1: I performed the above scribed service and the documentation accurately describes the services I performed. I attest to the accuracy of the note.      SCRIBE ATTESTATION NOTE:  I attest that I personally performed the services documented by the scribe and acknowledged and confirm the content of the note.   Nurses notes were reviewed.  Camille Ngo        Nurses notes were reviewed.         ED Disposition Condition    Discharge Stable                          Camille Ngo MD  01/06/22 9380

## 2022-04-28 ENCOUNTER — HOSPITAL ENCOUNTER (EMERGENCY)
Facility: HOSPITAL | Age: 57
Discharge: HOME | End: 2022-04-28
Attending: EMERGENCY MEDICINE
Payer: MEDICAID

## 2022-04-28 VITALS
DIASTOLIC BLOOD PRESSURE: 85 MMHG | OXYGEN SATURATION: 98 % | TEMPERATURE: 98 F | BODY MASS INDEX: 31.38 KG/M2 | WEIGHT: 225 LBS | RESPIRATION RATE: 18 BRPM | HEART RATE: 75 BPM | SYSTOLIC BLOOD PRESSURE: 130 MMHG

## 2022-04-28 DIAGNOSIS — R07.9 CHEST PAIN: ICD-10-CM

## 2022-04-28 DIAGNOSIS — M25.519 SHOULDER PAIN: ICD-10-CM

## 2022-04-28 LAB
ALBUMIN SERPL-MCNC: 3.7 G/DL (ref 3.3–5.5)
ALP SERPL-CCNC: 76 U/L (ref 42–141)
BILIRUB SERPL-MCNC: 0.5 MG/DL (ref 0.2–1.6)
BUN SERPL-MCNC: 9 MG/DL (ref 7–22)
CALCIUM SERPL-MCNC: 9.9 MG/DL (ref 8–10.3)
CHLORIDE SERPL-SCNC: 102 MMOL/L (ref 98–108)
CREAT SERPL-MCNC: 1 MG/DL (ref 0.6–1.2)
GLUCOSE SERPL-MCNC: 131 MG/DL (ref 73–118)
HCT, POC: NORMAL
HGB, POC: NORMAL (ref 14–18)
MCH, POC: NORMAL
MCHC, POC: NORMAL
MCV, POC: NORMAL
MPV, POC: NORMAL
POC ALT (SGPT): 29 U/L (ref 10–47)
POC AST (SGOT): 30 U/L (ref 11–38)
POC CARDIAC TROPONIN I: 0 NG/ML
POC PLATELET COUNT: NORMAL
POC PTINR: 1.1 (ref 0.9–1.2)
POC PTWBT: 13 SEC (ref 9.7–14.3)
POC TCO2: 26 MMOL/L (ref 18–33)
POTASSIUM BLD-SCNC: 3.4 MMOL/L (ref 3.6–5.1)
PROTEIN, POC: 8.3 G/DL (ref 6.4–8.1)
RBC, POC: NORMAL
RDW, POC: NORMAL
SAMPLE: NORMAL
SAMPLE: NORMAL
SODIUM BLD-SCNC: 142 MMOL/L (ref 128–145)
WBC, POC: NORMAL

## 2022-04-28 PROCEDURE — 84484 ASSAY OF TROPONIN QUANT: CPT | Mod: ER

## 2022-04-28 PROCEDURE — 80053 COMPREHEN METABOLIC PANEL: CPT | Mod: ER

## 2022-04-28 PROCEDURE — 99284 EMERGENCY DEPT VISIT MOD MDM: CPT | Mod: 25,ER

## 2022-04-28 PROCEDURE — 85025 COMPLETE CBC W/AUTO DIFF WBC: CPT | Mod: ER

## 2022-04-28 PROCEDURE — 85610 PROTHROMBIN TIME: CPT | Mod: ER

## 2022-04-28 RX ORDER — DICLOFENAC SODIUM 10 MG/G
2 GEL TOPICAL 4 TIMES DAILY PRN
Qty: 1 EACH | Refills: 0 | OUTPATIENT
Start: 2022-04-28 | End: 2022-09-16

## 2022-04-28 RX ORDER — FAMOTIDINE 20 MG/1
20 TABLET, FILM COATED ORAL 2 TIMES DAILY
Qty: 20 TABLET | Refills: 0 | Status: SHIPPED | OUTPATIENT
Start: 2022-04-28 | End: 2023-04-28

## 2022-04-28 NOTE — ED PROVIDER NOTES
"Encounter Date: 4/28/2022    SCRIBE #1 NOTE: I, Radha Ibrara, am scribing for, and in the presence of,  Enriqueta Nettles MD. I have scribed the following portions of the note - Other sections scribed: HPI; ROS; PE.       History     Chief Complaint   Patient presents with    Chest Pain     Pt states," I woke up with some chest pain and left shoulder pain today. No pain now."     Braeden Rowland is a 56 y.o. male with Hx of GERD, HTN, and HLD who presents to the ED for chief complaint of left shoulder pain radiating to the chest onset today. Patient reports he was walking around when his shoulder "felt funny" and it only lasted for about 20 seconds. He states he had similar symptoms in the past with palpitations and came to ED for it and was referred to a cardiologist. Patient reports the cardiologist performed tests on him and his heart was fine. He notes is last visit to the cardiologist was 1 month ago. Patient states he goes to the gym and runs on the treadmill for about 30 mines 3 times a week without any chest pain. He reports he was told he has arthritis in his left shoulder and was doing physical therapy for it that helped with the pain 1 year ago. Patient denies nausea, sweating, shortness of breath, lower extremity swelling, or lower extremity pain.     The history is provided by the patient. No  was used.     Review of patient's allergies indicates:  No Known Allergies  Past Medical History:   Diagnosis Date    Chest pain 10/2019    Cigarette smoker     GERD (gastroesophageal reflux disease)     Hyperlipidemia     used to take medication    Hypertension      Past Surgical History:   Procedure Laterality Date    NO PAST SURGERIES  03/06/2020     No family history on file.  Social History     Tobacco Use    Smoking status: Former Smoker     Packs/day: 0.50     Types: Cigarettes    Smokeless tobacco: Never Used   Substance Use Topics    Alcohol use: Yes     Alcohol/week: 21.0 " "standard drinks     Types: 21 Cans of beer per week     Comment: 3/6/20: "about 3, everyday after I get off work"    Drug use: No     Review of Systems   Respiratory: Negative for shortness of breath.    Cardiovascular: Positive for chest pain. Negative for leg swelling.   Gastrointestinal: Negative for nausea.   Musculoskeletal: Positive for myalgias.   All other systems reviewed and are negative.      Physical Exam     Initial Vitals [04/28/22 0752]   BP Pulse Resp Temp SpO2   (!) 150/88 77 16 98 °F (36.7 °C) 99 %      MAP       --         Physical Exam    Nursing note and vitals reviewed.  Constitutional: Vital signs are normal. He appears well-developed.  Non-toxic appearance.   HENT:   Head: Normocephalic and atraumatic.   Eyes: Lids are normal. Pupils are equal, round, and reactive to light. Right eye exhibits no discharge. Left eye exhibits no discharge.   Neck: Trachea normal. Neck supple.   Normal range of motion.  Cardiovascular: Normal rate, regular rhythm, normal heart sounds, intact distal pulses and normal pulses.   Lungs clear to auscultation B, no wheezes. Good air movement   Pulmonary/Chest: Effort normal and breath sounds normal.   Abdominal: Abdomen is soft. Bowel sounds are normal. There is no abdominal tenderness.   Musculoskeletal:         General: Normal range of motion.      Cervical back: Normal range of motion and neck supple.     Neurological: He is alert. He displays a negative Romberg sign. Coordination and gait normal. GCS eye subscore is 4. GCS verbal subscore is 5. GCS motor subscore is 6.   Skin: Skin is warm and dry.   Psychiatric: He has a normal mood and affect. His speech is normal and behavior is normal.         ED Course   Procedures  Labs Reviewed   POCT CMP - Abnormal; Notable for the following components:       Result Value    POC Glucose 131 (*)     POC Potassium 3.4 (*)     Protein, POC 8.3 (*)     All other components within normal limits   TROPONIN ISTAT   POCT CBC "   POCT CBC   POCT TROPONIN   POCT CMP   POCT PROTIME-INR   POCT CMP   POCT TROPONIN   ISTAT PROCEDURE     EKG Readings: (Independently Interpreted)   Initial Reading: No STEMI. Previous EKG Date: 1/6/2022. Rhythm: Normal Sinus Rhythm. Heart Rate: 87. Ectopy: No Ectopy. ST Segments: Non-Specific ST Segment Depression.       Imaging Results          X-Ray Chest 1 View (Final result)  Result time 04/28/22 08:50:21    Final result by Carlo Cruz MD (04/28/22 08:50:21)                 Impression:      No detrimental change or radiographic acute intrathoracic process seen on this single view.      Electronically signed by: Carlo Cruz MD  Date:    04/28/2022  Time:    08:50             Narrative:    EXAMINATION:  XR CHEST 1 VIEW    CLINICAL HISTORY:  Pain in unspecified shoulder    TECHNIQUE:  Single frontal view of the chest was performed.    COMPARISON:  Chest radiograph 01/06/2022    FINDINGS:  No detrimental change.  Trachea is midline and patent.The lungs are well expanded and clear, with normal appearance of pulmonary vasculature and no pleural effusion or pneumothorax.    The cardiac silhouette is normal in size. The hilar and mediastinal contours are unremarkable.    Osseous structures appear stable without acute process seen.                               X-Ray Shoulder Trauma Left (Final result)  Result time 04/28/22 08:49:38    Final result by Carlo Cruz MD (04/28/22 08:49:38)                 Impression:      No acute displaced fracture-dislocation identified.      Electronically signed by: Carlo Cruz MD  Date:    04/28/2022  Time:    08:49             Narrative:    EXAMINATION:  XR SHOULDER TRAUMA 3 VIEW LEFT    CLINICAL HISTORY:  Pain in unspecified shoulder    TECHNIQUE:  Three views of the left shoulder were performed.    COMPARISON  Left shoulder series 09/15/2021    FINDINGS:  Bones are well mineralized. Overall alignment is within normal limits.  No displaced fracture, dislocation or  destructive osseous process.  Mild degenerative change at the AC joint.  No subcutaneous emphysema or radiodense retained foreign body.  Left lung apex is clear.                              X-Rays:   Independently Interpreted Readings:   Chest X-Ray: Normal heart size.  No infiltrates.  No acute abnormalities.   Other Readings:  Shoulder x-ray-no fracture or dislocation    Medications - No data to display  Medical Decision Making:   Patient's left anterior chest/shoulder sensation of feeling funny without any associated symptoms, lasted about 20 seconds.  His EKG is unchanged from January.  He had a normal stress test a year ago.  He also walks on a treadmill for 20 minutes about 3 times a week without any symptoms.  I feel that is unlikely cardiac related, but will get him a follow-up with Cardiology.  Patient is asking for something for gas.  He did say he did eat richardson this morning.  I have prescribed Pepcid for him.  He has had multiple visits for his left shoulder, which she said improved some with physical therapy in the Voltaren gel.  I have reordered this for him and got him follow up with physical therapy.          Scribe Attestation:   Scribe #1: I performed the above scribed service and the documentation accurately describes the services I performed. I attest to the accuracy of the note.               Scribe attestation: I Enriqueta Nettles MD   personally performed the services described in this documentation.  All medical record entries made by the scribe were at my direction and in my presence.  I have reviewed the chart and agree that the record reflects my personal performance and is accurate and complete.  Clinical Impression:   Final diagnoses:  [R07.9] Chest pain  [M25.519] Shoulder pain          ED Disposition Condition    Discharge Stable        ED Prescriptions     Medication Sig Dispense Start Date End Date Auth. Provider    diclofenac sodium (VOLTAREN) 1 % Gel Apply 2 g topically 4 (four)  times daily as needed (Apply to painful area up to 4 times a day as needed for pain). Apply to painful area 4 times a day as needed for pain 1 each 4/28/2022 5/8/2022 Enriqueta Nettles MD    famotidine (PEPCID) 20 MG tablet Take 1 tablet (20 mg total) by mouth 2 (two) times daily. 20 tablet 4/28/2022 4/28/2023 Enriqueta Nettles MD        Follow-up Information     Follow up With Specialties Details Why Contact Info    [facility]  Schedule an appointment as soon as possible for a visit in 1 week  [address] [place] [address]  [phone]      Call your cardiologist for follow-up appointment within the next 3 days               Enriqueta Nettles MD  04/28/22 9031

## 2022-04-28 NOTE — DISCHARGE INSTRUCTIONS
I have given you a referral to physical therapy.  See your primary care doctor within the next week for recheck, and blood pressure recheck.  Have your primary care doctor call to set up physical therapy appointment

## 2022-05-24 ENCOUNTER — HOSPITAL ENCOUNTER (EMERGENCY)
Facility: HOSPITAL | Age: 57
Discharge: HOME | End: 2022-05-25
Attending: EMERGENCY MEDICINE
Payer: MEDICAID

## 2022-05-24 DIAGNOSIS — R07.9 CHEST PAIN, UNSPECIFIED TYPE: Primary | ICD-10-CM

## 2022-05-24 DIAGNOSIS — I10 HIGH BLOOD PRESSURE: ICD-10-CM

## 2022-05-24 PROCEDURE — 99284 EMERGENCY DEPT VISIT MOD MDM: CPT | Mod: 25,ER

## 2022-05-24 RX ORDER — ASPIRIN 81 MG/1
162 TABLET ORAL
Status: DISCONTINUED | OUTPATIENT
Start: 2022-05-24 | End: 2022-05-24

## 2022-05-25 VITALS
DIASTOLIC BLOOD PRESSURE: 79 MMHG | BODY MASS INDEX: 30.8 KG/M2 | HEIGHT: 71 IN | TEMPERATURE: 99 F | SYSTOLIC BLOOD PRESSURE: 125 MMHG | RESPIRATION RATE: 18 BRPM | WEIGHT: 220 LBS | OXYGEN SATURATION: 96 % | HEART RATE: 70 BPM

## 2022-05-25 LAB
ALBUMIN SERPL-MCNC: 3.5 G/DL (ref 3.3–5.5)
ALP SERPL-CCNC: 83 U/L (ref 42–141)
BILIRUB SERPL-MCNC: 0.4 MG/DL (ref 0.2–1.6)
BUN SERPL-MCNC: 9 MG/DL (ref 7–22)
CALCIUM SERPL-MCNC: 9.2 MG/DL (ref 8–10.3)
CHLORIDE SERPL-SCNC: 106 MMOL/L (ref 98–108)
CREAT SERPL-MCNC: 1.1 MG/DL (ref 0.6–1.2)
GLUCOSE SERPL-MCNC: 158 MG/DL (ref 73–118)
POC ALT (SGPT): 26 U/L (ref 10–47)
POC AST (SGOT): 29 U/L (ref 11–38)
POC B-TYPE NATRIURETIC PEPTIDE: <5 PG/ML (ref 0–100)
POC CARDIAC TROPONIN I: 0.01 NG/ML
POC TCO2: 30 MMOL/L (ref 18–33)
POTASSIUM BLD-SCNC: 4.4 MMOL/L (ref 3.6–5.1)
PROTEIN, POC: 7.5 G/DL (ref 6.4–8.1)
SAMPLE: NORMAL
SODIUM BLD-SCNC: 142 MMOL/L (ref 128–145)

## 2022-05-25 PROCEDURE — 83880 ASSAY OF NATRIURETIC PEPTIDE: CPT | Mod: ER

## 2022-05-25 PROCEDURE — 80053 COMPREHEN METABOLIC PANEL: CPT | Mod: ER

## 2022-05-25 PROCEDURE — 84484 ASSAY OF TROPONIN QUANT: CPT | Mod: ER

## 2022-05-25 PROCEDURE — 85025 COMPLETE CBC W/AUTO DIFF WBC: CPT | Mod: ER

## 2022-05-25 NOTE — DISCHARGE INSTRUCTIONS
Continue medications as you have been prescribed.  Schedule close follow-up with your primary care physician/cardiologist.    Thank you for coming to our Emergency Department today. It is important to remember that some problems are difficult to diagnose and may not be found during your first visit. Be sure to follow up with your primary care doctor and review any labs/imaging that was performed with them. If you do not have a primary care doctor, you may contact the one listed on your discharge paperwork or you may also call the [facility] Appointment Desk at [phone] to schedule an appointment with one.     All medications may potentially have side effects and it is impossible to predict which medications may give you side effects. If you feel that you are having a negative effect of any medication you should immediately stop taking them and seek medical attention.    Return to the ER with any questions/concerns, new/concerning symptoms, worsening or failure to improve. Do not drive or make any important decisions for 24 hours if you have received any pain medications, sedatives or mood altering drugs during your ER visit.

## 2022-05-25 NOTE — FIRST PROVIDER EVALUATION
"Medical screening exam completed.  I have conducted a focused provider triage encounter, findings are as follows:    Brief history of present illness:  CP radiating to left shoulder and palpitations; no SOB; symptoms started around 7 pm    Vitals:    05/24/22 2040   BP: (!) 163/98   BP Location: Left arm   Patient Position: Sitting   Pulse: 86   Resp: 16   Temp: 98.1 °F (36.7 °C)   TempSrc: Oral   SpO2: 97%   Weight: 99.8 kg (220 lb)   Height: 5' 11" (1.803 m)       Pertinent physical exam:  NAD    Brief workup plan:  CP w/u    Preliminary workup initiated; this workup will be continued and followed by the physician or advanced practice provider that is assigned to the patient when roomed.  "

## 2022-05-25 NOTE — ED PROVIDER NOTES
"Encounter Date: 5/24/2022    SCRIBE #1 NOTE: I, Alejandro Gifford, am scribing for, and in the presence of,  Dr. Ramsey. I have scribed the following portions of the note - Other sections scribed: HPI, ROS, PE.       History     Chief Complaint   Patient presents with    Hypertension     Co high blood pressure, and heart racing while watching game; denies cp.      Braeden Rowland is a 56 y.o. male with Hx of HTN, HLD, and GERD who presents to the ED for chief complaint of elevated blood pressure with palpitations this evening around 7 PM. Patient reports he was sitting down watching the basketball game eating carrot cake when his heart began to race. He took his blood pressure reading and states it was in the 170s over 90. Patient takes amlodipine and endorses daily compliance. Denies nausea, vomiting, or pain present anywhere at this moment. Patient also mentions that he suffers from GERD and has been eating spicy foods and drinking beer regularly. Reports taking his acid reflux medications as prescribed.    The history is provided by the patient. No  was used.     Review of patient's allergies indicates:  No Known Allergies  Past Medical History:   Diagnosis Date    Chest pain 10/2019    Cigarette smoker     GERD (gastroesophageal reflux disease)     Hyperlipidemia     used to take medication    Hypertension      Past Surgical History:   Procedure Laterality Date    NO PAST SURGERIES  03/06/2020     History reviewed. No pertinent family history.  Social History     Tobacco Use    Smoking status: Former Smoker     Packs/day: 0.50     Types: Cigarettes    Smokeless tobacco: Never Used   Substance Use Topics    Alcohol use: Yes     Alcohol/week: 21.0 standard drinks     Types: 21 Cans of beer per week     Comment: 3/6/20: "about 3, everyday after I get off work"    Drug use: No     Review of Systems   Constitutional: Negative for fever.   HENT: Negative for sore throat.    Respiratory: " Negative for shortness of breath.    Cardiovascular: Positive for palpitations (resolved). Negative for chest pain and leg swelling.   Gastrointestinal: Negative for nausea and vomiting.   Genitourinary: Negative for dysuria.   Musculoskeletal: Negative for back pain.   Skin: Negative for rash.   Neurological: Negative for weakness and numbness.   Hematological: Does not bruise/bleed easily.   All other systems reviewed and are negative.      Physical Exam     Initial Vitals [05/24/22 2040]   BP Pulse Resp Temp SpO2   (!) 163/98 86 16 98.1 °F (36.7 °C) 97 %      MAP       --         Physical Exam    Nursing note and vitals reviewed.  Constitutional: He is not diaphoretic. No distress.   HENT:   Head: Normocephalic and atraumatic.   Mouth/Throat: Oropharynx is clear and moist.   Eyes: Conjunctivae and EOM are normal. No scleral icterus.   Neck: Neck supple. No tracheal deviation present.   Normal range of motion.  Cardiovascular: Normal rate, regular rhythm and intact distal pulses.   Pulmonary/Chest: Breath sounds normal. No stridor. No respiratory distress.   Abdominal: Abdomen is soft. He exhibits no distension. There is no abdominal tenderness.   Musculoskeletal:         General: No tenderness or edema. Normal range of motion.      Cervical back: Normal range of motion and neck supple.     Neurological: He is alert. He has normal strength. No cranial nerve deficit or sensory deficit. GCS score is 15. GCS eye subscore is 4. GCS verbal subscore is 5. GCS motor subscore is 6.   Skin: Skin is warm and dry.   Psychiatric: He has a normal mood and affect.         ED Course   Procedures  Labs Reviewed   POCT CMP - Abnormal; Notable for the following components:       Result Value    POC Glucose 158 (*)     All other components within normal limits   TROPONIN ISTAT   POCT CBC   POCT CMP   POCT TROPONIN   POCT B-TYPE NATRIURETIC PEPTIDE (BNP)   POCT B-TYPE NATRIURETIC PEPTIDE (BNP)     EKG Readings: (Independently  Interpreted)   Initial Reading: No STEMI. Rhythm: Normal Sinus Rhythm. Heart Rate: 90. Ectopy: No Ectopy. Conduction: Normal. ST Segments: Normal ST Segments.   Nonspecific T-wave abnormality       Imaging Results          X-Ray Chest AP Portable (Final result)  Result time 05/25/22 00:18:43   Procedure changed from X-Ray Chest PA And Lateral     Final result by Abi Barker MD (05/25/22 00:18:43)                 Impression:      No acute intrathoracic abnormality identified on this single radiographic view of the chest.      Electronically signed by: Abi Barker MD  Date:    05/25/2022  Time:    00:18             Narrative:    EXAMINATION:  XR CHEST AP PORTABLE    CLINICAL HISTORY:  Chest Pain; Essential (primary) hypertension    TECHNIQUE:  Single frontal view of the chest was performed.    COMPARISON:  01/06/2022    FINDINGS:  The cardiomediastinal silhouette is within normal limits. The visualized airway is unremarkable.  The lungs appear symmetrically expanded without definite evidence of confluent airspace consolidation, significant volume of pleural fluid or pneumothorax.  Visualized osseous structures are intact.                                 Medications - No data to display  Medical Decision Making:   History:   Old Medical Records: I decided to obtain old medical records.  Differential Diagnosis:   Includes but not limited to: Gastris, gastroenteritis  Independently Interpreted Test(s):   I have ordered and independently interpreted EKG Reading(s) - see prior notes  Clinical Tests:   Lab Tests: Ordered and Reviewed  Radiological Study: Ordered and Reviewed  Medical Tests: Ordered and Reviewed  ED Management:  Patient presenting secondary to chest pain. EKG was reassuring and chest xray showed nothing acute.     [email or web address]    Also considered but less likely:     PE: normal rate, no sob/recent immobilization/surgery/travel/family history.   Pneumonia: chest  xray negative. No fever. No cough and lungs non consistent with pna  Tamponade: unlikely due to chest xray and ekg  STEMI: No STEMI on ekg  Dissection: equal pulses bilaterally and no ripping chest pain to the back  Esophageal rupture: no dysphagia or vomiting and chest xray negative for mediastinal air  Arrhythmia: no arrhythmia on ekg  CHF: no fluid overload on Cxr and physical exam  Pneumothorax: bilateral breath sounds and no signs of pneumothorax on chest xray     Patient has a lower risk of impending cardiac failure to the heart score of 3. Patient was discharged with follow up with PMD and cardiology. Return precautions given, patient understands and agrees with plan. All questions answered.   This chart was completed using dictation software, as a result there may be some transcription errors.     Additional MDM:   Heart Score:    History:          Slightly suspicious.  ECG:             Nonspecific repolarisation disturbance  Age:               45-65 years  Risk factors: 1-2 risk factors  Troponin:       Less than or equal to normal limit  Final Score: 3           Scribe Attestation:   Scribe #1: I performed the above scribed service and the documentation accurately describes the services I performed. I attest to the accuracy of the note.               Scribe attestation: I, Jose F Ramsey , personally performed the services described in this documentation.  All medical record entries made by the scribe were at my direction and in my presence.  I have reviewed the chart and agree that the record reflects my personal performance and is accurate and complete.    Clinical Impression:   Final diagnoses:  [R07.9] Chest pain, unspecified type (Primary)          ED Disposition Condition    Discharge Stable        ED Prescriptions     None        Follow-up Information    None          Jose F Ramsey MD  05/25/22 0054

## 2022-06-03 ENCOUNTER — CLINICAL SUPPORT (OUTPATIENT)
Dept: REHABILITATION | Facility: HOSPITAL | Age: 57
End: 2022-06-03
Attending: EMERGENCY MEDICINE
Payer: MEDICAID

## 2022-06-03 DIAGNOSIS — R29.3 POSTURAL IMBALANCE: Primary | ICD-10-CM

## 2022-06-03 DIAGNOSIS — G89.29 CHRONIC LEFT SHOULDER PAIN: ICD-10-CM

## 2022-06-03 DIAGNOSIS — M25.519 SHOULDER PAIN: ICD-10-CM

## 2022-06-03 DIAGNOSIS — M25.512 CHRONIC LEFT SHOULDER PAIN: ICD-10-CM

## 2022-06-03 PROCEDURE — 97110 THERAPEUTIC EXERCISES: CPT | Mod: PN

## 2022-06-03 PROCEDURE — 97161 PT EVAL LOW COMPLEX 20 MIN: CPT | Mod: PN

## 2022-06-03 NOTE — PROGRESS NOTES
"See Plan of Care for complete Physical Therapy Evaluation.                                                     Physical Therapy Initial Evaluation     Name: Braeden Rowland  Clinic Number: [id number]    Therapy Diagnosis:   Encounter Diagnoses   Name Primary?    Shoulder pain     Postural imbalance Yes    Chronic left shoulder pain      Physician: Enriqueta Nettles MD    Physician Orders: PT Eval and Treat   Medical Diagnosis from Referral: M25.519 (ICD-10-CM) - Shoulder pain  Evaluation Date: 6/3/2022  Authorization Period Expiration: 04/28/2023  Plan of Care Expiration: 9/3/22  Visit # / Visits authorized: 1/ 1    Time In: 0710a  Time Out: 0745a  Total Billable Time: 45 minutes    Precautions: Standard and HTN        TREATMENT     Treatment Time In: 0732  Treatment Time Out: 0745a  Total Treatment time separate from Evaluation: 13 minutes    Braeden received therapeutic exercises to develop strength, flexibility and posture for 13 minutes including:    Supine serratus punch 5#  Doorway stretch 30"x3  Prone scaption 10x  Prone horizontal abduction 10x    NV: UBE, bilateral shoulder ER, rows,     Short Term GOALS: 5 weeks. Pt agrees with goals set.  1. Patient demonstrates independence with HEP.   2. Patient demonstrates independence with Postural Awareness.   3.  Patient will report pain of </=1/10 at worst, on 0-10 pain scale, with all activity  4. Pt will report 50% improvement in ability to lift overhead without pain since start of care to indicate improved functional use of UE at work.  5. Pt will increase UE strength by 1/3 muscle grade or greater on MMT to improve tolerance to all functional activities pain free.     Long Term Goals 10 Weeks. Pt agrees with goals set:  1. Pt will report 80% improvement in ability to lift overhead without pain since start of care to indicate improved functional use of UE at work.  2. Pt will increase UE strength to 5/5 to improve tolerance to all functional activities pain " free.   3. Patient demonstrates ability to lift 5 to 10lb object overhead with little to no difficulty to improve tolerance to work tasks pain free    Plan     Plan of care Certification: 6/3/2022 to 9/3/22.    Outpatient Physical Therapy 1-2 times weekly for 10 weeks to include the following interventions: Manual Therapy, Moist Heat/ Ice, Neuromuscular Re-ed, Patient Education, Therapeutic Activities, Therapeutic Exercise and Dry needling. Pt may be seen by PTA as part of the rehabilitation team.    Jena Ly, PT,DPT  6/6/2022

## 2022-06-07 NOTE — PLAN OF CARE
Physical Therapy Initial Evaluation     Name: Braeden Rowland  Clinic Number: [id number]    Therapy Diagnosis:   Encounter Diagnoses   Name Primary?    Shoulder pain     Postural imbalance Yes    Chronic left shoulder pain      Physician: Enriqueta Nettles MD    Physician Orders: PT Eval and Treat   Medical Diagnosis from Referral: M25.519 (ICD-10-CM) - Shoulder pain  Evaluation Date: 6/3/2022  Authorization Period Expiration: 04/28/2023  Plan of Care Expiration: 9/3/22  Visit # / Visits authorized: 1/ 1    Time In: 0710a  Time Out: 0745a  Total Billable Time: 45 minutes    Precautions: Standard and HTN    Subjective       Medical History:   Past Medical History:   Diagnosis Date    Chest pain 10/2019    Cigarette smoker     GERD (gastroesophageal reflux disease)     Hyperlipidemia     used to take medication    Hypertension        Surgical History:   Braeden Rowland  has a past surgical history that includes No past surgeries (03/06/2020).    Medications:   Braeden WEST has a current medication list which includes the following prescription(s): acetaminophen, amlodipine, aspirin, atorvastatin, diclofenac sodium, famotidine, [DISCONTINUED] meclizine, [DISCONTINUED] pantoprazole, and [DISCONTINUED] ranitidine.    Allergies:   Review of patient's allergies indicates:  No Known Allergies     Date of onset: a few months  History of current condition - Braeden reports: He has been feeling pretty good with his shoulder but it will start to bother him if he has to work overhead for a prolonged period of time. He does report back stiffness in his back from a pinched nerve a long time ago. 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 He is RHD. Denies N/T. He reports that the pain will go from his shoulder and right along his collar bone. He came to PT around a year ago and he tries to keep up with his exercises and  that seems to help his shoulder pain.     Imaging, x-ray per pt chart from 4/28/22: FINDINGS:  Bones are well mineralized. Overall alignment is within normal limits.  No displaced fracture, dislocation or destructive osseous process.  Mild degenerative change at the AC joint.  No subcutaneous emphysema or radiodense retained foreign body.  Left lung apex is clear.     Impression:     No acute displaced fracture-dislocation identified.       Prior Therapy: PT for left shoulder about a year ago  Social History:  lives with their family  Occupation: contractor   Prior Level of Function: independent  DME owned/used: none  Current Level of Function: working overhead for long time    Pain:  Current 0/10, worst 3/10, best 0/10   Location: left shoulder   Description: Aching  Aggravating Factors: working overhead for prolonged periods of time  Easing Factors: rest    Pts goals: Be able to work overhead without pain      Objective     Posture: forward head, rounded shoulders    Palpation: TTP at AC joint    Shoulder Range of Motion:   ACTIVE ROM LEFT RIGHT   Flexion 155 150   Abduction 140 130   Horizontal Abd To opposite shoulder To opposite shoulder   IR L2 L2   ER C7 C7     STRENGTH LEFT RIGHT   Flexion 5/5 5/5   Abduction 5/5 5/5   Extension 5/5 5/5   IR (neutral) 5/5 5/5   ER (neutral) 5/5 5/5   Middle Trap 4/5 4/5   Lower Trap 4/5 4/5   Serratus anterior 4/5 4/5     Joint Mobility: capsular tightness    Scapular Control/Dyskinesis:    Normal / Subtle / Obvious   Left subtle   Right none     Special Tests:      Impingement Right Left   Empty Can Test    -   Hawkin's Canelo   -   Neer's Test    -      SLAP lesion/ biceps tendonitis Right Left   O' Tucker's Test    -   Speed's Test    -      Rotator Cuff Lesion Right Left   Drop Arm test    -   Subscapularis Lift Off    -   Belly Press Test    -   External rotation Lag Sign    -      AC Joint Right Left   AC Joint Compression Test    +       CMS  "Impairment/Limitation/Restriction for FOTO Shoulder Survey    Therapist reviewed FOTO scores for Braeden Rowland on 6/3/2022.   FOTO documents entered into [email or web address] - see Media section.    Limitation Score: 13%       TREATMENT     Treatment Time In: 0732  Treatment Time Out: 0745a  Total Treatment time separate from Evaluation: 13 minutes    Braeden received therapeutic exercises to develop strength, flexibility and posture for 13 minutes including:    Supine serratus punch 5#  Doorway stretch 30"x3  Prone scaption 10x  Prone horizontal abduction 10x    NV: UBE, bilateral shoulder ER, rows,     Home Exercises and Patient Education Provided    Education provided:   - HEP  - POC  - anatomy involved     Written Home Exercises Provided: yes.  Exercises were reviewed and Braeden was able to demonstrate them prior to the end of the session.  Braeden demonstrated good  understanding of the education provided.     See EMR under Patient Instructions for exercises provided 6/3/2022.      Assessment     Braeden WEST is a 56 y.o. male referred to outpatient Physical Therapy with a medical diagnosis of shoulder pain. with signs and symptoms including:: increased shoulder pain, decreased UE ROM, decreased UE Strength, soft tissue dysfunction, postural imbalance,impaired joint mobility, and decreased tolerance to functional activities. Signs and symptoms consistent with AC joint pathology with serratus anterior weakness and tenderness. Pt responded well with therex and required moderate cueing for proper mechanics with serratus anterior. He is currently most limited with his ability to perform overhead tasks for prolonged periods of time.   Pt with good motivation to perform physical activity and responds well to cueing.  Pt prognosis is Good.  Pt will benefit from skilled outpatient physical therapy to address the above stated deficits, provide pt/family education and to maximize pt's level of independence.     Plan of care discussed with " patient: Yes  Pt's spiritual, cultural and educational needs considered and patient is agreeable to the plan of care and goals as stated below:     Anticipated Barriers for therapy: none    Medical Necessity is demonstrated by the following  History  Co-morbidities and personal factors that may impact the plan of care Co-morbidities:   Chest pain   Cigarette smoker   GERD (gastroesophageal reflux disease)   Hyperlipidemia   used to take medication   Hypertension       Personal Factors:   social background  lifestyle     moderate   Examination  Body Structures and Functions, activity limitations and participation restrictions that may impact the plan of care Body Regions:   upper extremities    Body Systems:    strength  motor control  motor learning    Participation Restrictions:   Working overhead for prolonged periods of time    Activity limitations:   Learning and applying knowledge  no deficits    General Tasks and Commands  no deficits    Communication  no deficits    Mobility  lifting and carrying objects    Self care  no deficits    Domestic Life  no deficits    Interactions/Relationships  No deficits    Life Areas  No deficits    Community and Social Life  No deficits         moderate   Clinical Presentation stable and uncomplicated low   Decision Making/ Complexity Score: low     Pt's spiritual, cultural and educational needs considered and pt agreeable to plan of care and goals as stated below:     Short Term GOALS: 5 weeks. Pt agrees with goals set.  1. Patient demonstrates independence with HEP.   2. Patient demonstrates independence with Postural Awareness.   3.  Patient will report pain of </=1/10 at worst, on 0-10 pain scale, with all activity  4. Pt will report 50% improvement in ability to lift overhead without pain since start of care to indicate improved functional use of UE at work.  5. Pt will increase UE strength by 1/3 muscle grade or greater on MMT to improve tolerance to all functional  activities pain free.     Long Term Goals 10 Weeks. Pt agrees with goals set:  1. Pt will report 80% improvement in ability to lift overhead without pain since start of care to indicate improved functional use of UE at work.  2. Pt will increase UE strength to 5/5 to improve tolerance to all functional activities pain free.   3. Patient demonstrates ability to lift 5 to 10lb object overhead with little to no difficulty to improve tolerance to work tasks pain free    Plan     Plan of care Certification: 6/3/2022 to 9/3/22.    Outpatient Physical Therapy 1-2 times weekly for 10 weeks to include the following interventions: Manual Therapy, Moist Heat/ Ice, Neuromuscular Re-ed, Patient Education, Therapeutic Activities, Therapeutic Exercise and Dry needling. Pt may be seen by PTA as part of the rehabilitation team.    Jena Ly, PT,DPT  6/6/2022    I have seen the patient, reviewed the therapist's plan of care, and I agree with the plan of care.      I certify the need for these services furnished under this plan of treatment and while under my care.     ___________________ ________ Physician/Referring Practitioner            ___________________________ Date of Signature

## 2022-06-14 ENCOUNTER — HOSPITAL ENCOUNTER (EMERGENCY)
Facility: HOSPITAL | Age: 57
Discharge: HOME | End: 2022-06-14
Attending: EMERGENCY MEDICINE
Payer: MEDICAID

## 2022-06-14 VITALS
WEIGHT: 225 LBS | TEMPERATURE: 99 F | OXYGEN SATURATION: 99 % | DIASTOLIC BLOOD PRESSURE: 79 MMHG | HEART RATE: 79 BPM | SYSTOLIC BLOOD PRESSURE: 146 MMHG | HEIGHT: 71 IN | BODY MASS INDEX: 31.5 KG/M2 | RESPIRATION RATE: 19 BRPM

## 2022-06-14 DIAGNOSIS — R00.2 PALPITATIONS: Primary | ICD-10-CM

## 2022-06-14 DIAGNOSIS — R07.9 CHEST PAIN: ICD-10-CM

## 2022-06-14 DIAGNOSIS — R00.0 TACHYCARDIA: ICD-10-CM

## 2022-06-14 LAB
ALBUMIN SERPL-MCNC: 3.7 G/DL (ref 3.3–5.5)
ALP SERPL-CCNC: 71 U/L (ref 42–141)
BILIRUB SERPL-MCNC: 0.6 MG/DL (ref 0.2–1.6)
BUN SERPL-MCNC: 12 MG/DL (ref 7–22)
CALCIUM SERPL-MCNC: 9.7 MG/DL (ref 8–10.3)
CHLORIDE SERPL-SCNC: 99 MMOL/L (ref 98–108)
CREAT SERPL-MCNC: 0.9 MG/DL (ref 0.6–1.2)
GLUCOSE SERPL-MCNC: 118 MG/DL (ref 73–118)
HCT, POC: NORMAL
HGB, POC: NORMAL (ref 14–18)
MCH, POC: NORMAL
MCHC, POC: NORMAL
MCV, POC: NORMAL
MPV, POC: NORMAL
POC ALT (SGPT): 24 U/L (ref 10–47)
POC AST (SGOT): 25 U/L (ref 11–38)
POC CARDIAC TROPONIN I: 0 NG/ML
POC PLATELET COUNT: NORMAL
POC TCO2: 26 MMOL/L (ref 18–33)
POTASSIUM BLD-SCNC: 4 MMOL/L (ref 3.6–5.1)
PROTEIN, POC: 7.6 G/DL (ref 6.4–8.1)
RBC, POC: NORMAL
RDW, POC: NORMAL
SAMPLE: NORMAL
SODIUM BLD-SCNC: 141 MMOL/L (ref 128–145)
WBC, POC: NORMAL

## 2022-06-14 PROCEDURE — 84484 ASSAY OF TROPONIN QUANT: CPT | Mod: ER

## 2022-06-14 PROCEDURE — 85025 COMPLETE CBC W/AUTO DIFF WBC: CPT | Mod: ER

## 2022-06-14 PROCEDURE — 93010 EKG 12-LEAD: ICD-10-PCS | Mod: ,,, | Performed by: INTERNAL MEDICINE

## 2022-06-14 PROCEDURE — 93010 ELECTROCARDIOGRAM REPORT: CPT | Mod: ,,, | Performed by: INTERNAL MEDICINE

## 2022-06-14 PROCEDURE — 99284 EMERGENCY DEPT VISIT MOD MDM: CPT | Mod: 25,ER

## 2022-06-14 PROCEDURE — 80053 COMPREHEN METABOLIC PANEL: CPT | Mod: ER

## 2022-06-14 PROCEDURE — 93005 ELECTROCARDIOGRAM TRACING: CPT | Mod: ER

## 2022-06-15 NOTE — ED PROVIDER NOTES
"Encounter Date: 6/14/2022    SCRIBE #1 NOTE: I, Alejandro Gifford, am scribing for, and in the presence of,  Dr. Davidson. I have scribed the following portions of the note - Other sections scribed: HPI, ROS, PE.       History     Chief Complaint   Patient presents with    Tachycardia     PT C/O FEELING HEART RACING AFTER EATING 40 MIN AGO, DENIES ANY PAIN OR SOB AT THAT TIME. PT DENIES ANY SYMPTOMS AT PRESENT TIME     Braeden Rowland is a 56 y.o. male with Hx of HLD, GERD, HTN, and other medical problems who presents to the ED for evaluation of palpitations after eating 40 minutes ago. Patient denies SOB or chest pain. He denies having any symptoms at the present time. Patient reports being recently seen by Cardio and states that his blood work came back fine.    The history is provided by the patient.     Review of patient's allergies indicates:  No Known Allergies  Past Medical History:   Diagnosis Date    Chest pain 10/2019    Cigarette smoker     GERD (gastroesophageal reflux disease)     Hyperlipidemia     used to take medication    Hypertension      Past Surgical History:   Procedure Laterality Date    NO PAST SURGERIES  03/06/2020     No family history on file.  Social History     Tobacco Use    Smoking status: Former Smoker     Packs/day: 0.50     Types: Cigarettes    Smokeless tobacco: Never Used   Substance Use Topics    Alcohol use: Yes     Alcohol/week: 21.0 standard drinks     Types: 21 Cans of beer per week     Comment: 3/6/20: "about 3, everyday after I get off work"    Drug use: No     Review of Systems   Constitutional: Negative.    HENT: Negative.    Eyes: Negative.    Respiratory: Negative.  Negative for shortness of breath.    Cardiovascular: Positive for palpitations. Negative for chest pain.   Gastrointestinal: Negative.    Endocrine: Negative.    Genitourinary: Negative.    Musculoskeletal: Negative.    Skin: Negative.    Allergic/Immunologic: Negative.    Neurological: Negative.  "   Hematological: Negative.    Psychiatric/Behavioral: Negative.    All other systems reviewed and are negative.      Physical Exam     Initial Vitals [06/14/22 1948]   BP Pulse Resp Temp SpO2   (!) 161/95 92 20 98.6 °F (37 °C) 98 %      MAP       --         Physical Exam    Nursing note and vitals reviewed.  Constitutional: He appears well-developed and well-nourished.   HENT:   Head: Normocephalic and atraumatic.   Eyes: Conjunctivae and EOM are normal.   Neck: Neck supple.   Normal range of motion.  Cardiovascular: Normal rate and intact distal pulses.   Pulmonary/Chest: Effort normal. No respiratory distress.   Abdominal: Abdomen is soft. There is no abdominal tenderness.   Musculoskeletal:         General: Normal range of motion.      Cervical back: Normal range of motion and neck supple.     Neurological: He is alert and oriented to person, place, and time.   Skin: Skin is warm and dry.   Psychiatric: He has a normal mood and affect. His behavior is normal.         ED Course   Procedures  Labs Reviewed   TROPONIN ISTAT   POCT CBC   POCT CMP   POCT TROPONIN   POCT CMP         EKG Readings: (Independently Interpreted)   Rhythm: Normal Sinus Rhythm. Heart Rate: 57. Ectopy: No Ectopy. Conduction: Normal. ST Segments: Normal ST Segments. T Waves: Normal. Clinical Impression: Normal Sinus Rhythm   No STEMI. Rate of 92. Normal Sinus Rhythm. Normal Axis. Abnormal EKG. Nonspecific T wave abnormality. QTc normal at 432.       Imaging Results    None          Medications - No data to display  Medical Decision Making:   History:   Old Medical Records: I decided to obtain old medical records.  Independently Interpreted Test(s):   I have ordered and independently interpreted EKG Reading(s) - see prior notes  Clinical Tests:   Lab Tests: Ordered and Reviewed  Medical Tests: Ordered and Reviewed          Scribe Attestation:   Scribe #1: I performed the above scribed service and the documentation accurately describes the  services I performed. I attest to the accuracy of the note.                Results for orders placed or performed during the hospital encounter of 06/14/22   Troponin ISTAT   Result Value Ref Range    POC Cardiac Troponin I 0.00 <0.09 ng/mL    Sample unknown    POCT CBC   Result Value Ref Range    Hematocrit      Hemoglobin      RBC      WBC      MCV      MCH, POC      MCHC      RDW-CV      Platelet Count, POC      MPV     POCT CMP   Result Value Ref Range    Albumin, POC 3.7 3.3 - 5.5 g/dL    Alkaline Phosphatase, POC 71 42 - 141 U/L    ALT (SGPT), POC 24 10 - 47 U/L    AST (SGOT), POC 25 11 - 38 U/L    POC BUN 12 7 - 22 mg/dL    Calcium, POC 9.7 8.0 - 10.3 mg/dL    POC Chloride 99 98 - 108 mmol/L    POC Creatinine 0.9 0.6 - 1.2 mg/dL    POC Glucose 118 73 - 118 mg/dL    POC Potassium 4.0 3.6 - 5.1 mmol/L    POC Sodium 141 128 - 145 mmol/L    Bilirubin, POC 0.6 0.2 - 1.6 mg/dL    POC TCO2 26 18 - 33 mmol/L    Protein, POC 7.6 6.4 - 8.1 g/dL        Scribe attestation: I, Misael Davidson MD, personally performed the services described in this documentation.  All medical record entries made by the scribe were at my direction and in my presence.  I have reviewed the chart and agree that the record reflects my personal performance and is accurate and complete.    Clinical Impression:   Final diagnoses:  [R00.0] Tachycardia  [R07.9] Chest pain  [R00.2] Palpitations (Primary)          ED Disposition Condition    Discharge Stable        ED Prescriptions     None        Follow-up Information     Follow up With Specialties Details Why Contact Info    Your cardiologist  Today             Misael Davidson MD  06/15/22 7280

## 2022-06-15 NOTE — FIRST PROVIDER EVALUATION
" Emergency Department TeleTriage Encounter Note      CHIEF COMPLAINT    Chief Complaint   Patient presents with    Tachycardia     PT C/O FEELING HEART RACING AFTER EATING 40 MIN AGO, DENIES ANY PAIN OR SOB AT THAT TIME. PT DENIES ANY SYMPTOMS AT PRESENT TIME       VITAL SIGNS   Initial Vitals [06/14/22 1948]   BP Pulse Resp Temp SpO2   (!) 161/95 92 20 98.6 °F (37 °C) 98 %      MAP       --            ALLERGIES    Review of patient's allergies indicates:  No Known Allergies    PROVIDER TRIAGE NOTE  This is a teletriage evaluation of a 56 y.o. male presenting to the ED with c/o feels like heart is racing x 40 min.  (-) SOB. "im feeling better now:"     ROS: (-) fever, (-) rash  PE:  NAD    Initial orders will be placed and care will be transferred to an alternate provider when patient is roomed for a full evaluation. Any additional orders and the final disposition will be determined by that provider.         ORDERS  Labs Reviewed - No data to display    ED Orders (720h ago, onward)    Start Ordered     Status Ordering Provider    06/14/22 2010 06/14/22 2009  Cardiac Monitoring - Adult  Continuous        Comments: Notify Physician If:    Ordered BUZZ OSPINA    06/14/22 2010 06/14/22 2009  Pulse Oximetry Continuous  Continuous         Ordered BUZZ OSPINA    06/14/22 2010 06/14/22 2009  Diet NPO  Diet effective now         Ordered BUZZ OSPINA    06/14/22 1948 06/14/22 1947  EKG 12-lead  Once         Completed by RAMIRO MADERA on 6/14/2022 at  7:55 PM BRIGIDO BURNETT    Unscheduled 06/14/22 2009  Vital signs  Every 15 min       Ordered BUZZ OSPINA    Unscheduled 06/14/22 2009  Saline lock IV  Once         Ordered BUZZ OSPINA    Unscheduled 06/14/22 2009  EKG 12-lead  Once        Comments: Do not perform if previously done during this visit/ triage.    Ordered BUZZ OSPINA    Unscheduled 06/14/22 2009  POCT CBC  Once         Ordered BUZZ OSPINA    Unscheduled " 06/14/22 2009  POCT CMP  Once         Ordered BUZZ OSPINA    Unscheduled 06/14/22 2009  POCT Troponin  Once         Ordered BUZZ OSPINA            Virtual Visit Note: The provider triage portion of this emergency department evaluation and documentation was performed via [employer], a HIPAA-compliant telemedicine application, in concert with a tele-presenter in the room. A face to face patient evaluation with one of my colleagues will occur once the patient is placed in an emergency department room.      DISCLAIMER: This note was prepared with [employer] voice recognition transcription software. Garbled syntax, mangled pronouns, and other bizarre constructions may be attributed to that software system.

## 2022-07-22 ENCOUNTER — HOSPITAL ENCOUNTER (EMERGENCY)
Facility: HOSPITAL | Age: 57
Discharge: HOME | End: 2022-07-23
Attending: EMERGENCY MEDICINE
Payer: MEDICAID

## 2022-07-22 DIAGNOSIS — R07.9 CHEST PAIN: ICD-10-CM

## 2022-07-22 DIAGNOSIS — R00.2 PALPITATIONS: Primary | ICD-10-CM

## 2022-07-22 LAB
ALBUMIN SERPL-MCNC: 3.7 G/DL (ref 3.3–5.5)
ALP SERPL-CCNC: 87 U/L (ref 42–141)
BILIRUB SERPL-MCNC: 0.4 MG/DL (ref 0.2–1.6)
BILIRUBIN, POC UA: NEGATIVE
BLOOD, POC UA: ABNORMAL
BUN SERPL-MCNC: 10 MG/DL (ref 7–22)
CALCIUM SERPL-MCNC: 10 MG/DL (ref 8–10.3)
CHLORIDE SERPL-SCNC: 106 MMOL/L (ref 98–108)
CLARITY, POC UA: CLEAR
COLOR, POC UA: YELLOW
CREAT SERPL-MCNC: 0.9 MG/DL (ref 0.6–1.2)
CTP QC/QA: YES
GLUCOSE SERPL-MCNC: 108 MG/DL (ref 73–118)
GLUCOSE, POC UA: NEGATIVE
HCT, POC: NORMAL
HGB, POC: NORMAL (ref 14–18)
KETONES, POC UA: NEGATIVE
LEUKOCYTE EST, POC UA: NEGATIVE
MCH, POC: NORMAL
MCHC, POC: NORMAL
MCV, POC: NORMAL
MPV, POC: NORMAL
NITRITE, POC UA: NEGATIVE
PH UR STRIP: 6 [PH]
POC ALT (SGPT): 30 U/L (ref 10–47)
POC AST (SGOT): 29 U/L (ref 11–38)
POC B-TYPE NATRIURETIC PEPTIDE: 5.9 PG/ML (ref 0–100)
POC CARDIAC TROPONIN I: 0 NG/ML
POC PLATELET COUNT: NORMAL
POC TCO2: 30 MMOL/L (ref 18–33)
POTASSIUM BLD-SCNC: 4.7 MMOL/L (ref 3.6–5.1)
PROTEIN, POC UA: NEGATIVE
PROTEIN, POC: 7.7 G/DL (ref 6.4–8.1)
RBC, POC: NORMAL
RDW, POC: NORMAL
SAMPLE: NORMAL
SARS-COV-2 RDRP RESP QL NAA+PROBE: NEGATIVE
SODIUM BLD-SCNC: 147 MMOL/L (ref 128–145)
SPECIFIC GRAVITY, POC UA: 1.01
UROBILINOGEN, POC UA: 0.2 E.U./DL
WBC, POC: NORMAL

## 2022-07-22 PROCEDURE — 83880 ASSAY OF NATRIURETIC PEPTIDE: CPT | Mod: ER

## 2022-07-22 PROCEDURE — 80053 COMPREHEN METABOLIC PANEL: CPT | Mod: ER

## 2022-07-22 PROCEDURE — U0002 COVID-19 LAB TEST NON-CDC: HCPCS | Mod: ER | Performed by: NURSE PRACTITIONER

## 2022-07-22 PROCEDURE — 25000003 PHARM REV CODE 250: Mod: ER | Performed by: NURSE PRACTITIONER

## 2022-07-22 PROCEDURE — 93010 EKG 12-LEAD: ICD-10-PCS | Mod: ,,, | Performed by: INTERNAL MEDICINE

## 2022-07-22 PROCEDURE — 85025 COMPLETE CBC W/AUTO DIFF WBC: CPT | Mod: ER

## 2022-07-22 PROCEDURE — 93010 ELECTROCARDIOGRAM REPORT: CPT | Mod: ,,, | Performed by: INTERNAL MEDICINE

## 2022-07-22 PROCEDURE — 81003 URINALYSIS AUTO W/O SCOPE: CPT | Mod: ER

## 2022-07-22 PROCEDURE — 99285 EMERGENCY DEPT VISIT HI MDM: CPT | Mod: 25,ER

## 2022-07-22 PROCEDURE — 84484 ASSAY OF TROPONIN QUANT: CPT | Mod: ER

## 2022-07-22 PROCEDURE — 93005 ELECTROCARDIOGRAM TRACING: CPT | Mod: ER

## 2022-07-22 RX ORDER — ASPIRIN 81 MG/1
81 TABLET ORAL
Status: COMPLETED | OUTPATIENT
Start: 2022-07-22 | End: 2022-07-22

## 2022-07-22 RX ADMIN — ASPIRIN 81 MG: 81 TABLET, COATED ORAL at 11:07

## 2022-07-23 VITALS
RESPIRATION RATE: 16 BRPM | HEART RATE: 73 BPM | BODY MASS INDEX: 31.38 KG/M2 | DIASTOLIC BLOOD PRESSURE: 78 MMHG | OXYGEN SATURATION: 96 % | HEIGHT: 71 IN | SYSTOLIC BLOOD PRESSURE: 130 MMHG | TEMPERATURE: 98 F

## 2022-07-23 NOTE — FIRST PROVIDER EVALUATION
"Medical screening exam completed.  I have conducted a focused provider triage encounter, findings are as follows:    Brief history of present illness:  Chest pain that started at approximately 8pm tonight; took 162 mg ASA PTA    Vitals:    07/22/22 2104   BP: (!) 141/74   BP Location: Right arm   Patient Position: Lying   Pulse: 91   Resp: 18   Temp: 98.4 °F (36.9 °C)   TempSrc: Oral   SpO2: 98%   Height: 5' 11" (1.803 m)       Pertinent physical exam:  NAD    Brief workup plan:  Labs, CXR, UA    Preliminary workup initiated; this workup will be continued and followed by the physician or advanced practice provider that is assigned to the patient when roomed.  "

## 2022-07-23 NOTE — ED PROVIDER NOTES
"Encounter Date: 7/22/2022    SCRIBE #1 NOTE: I, Finesse Leo, am scribing for, and in the presence of,  Vesna Younger MD. I have scribed the following portions of the note - Other sections scribed: HPI,ROS,PE.       History     Chief Complaint   Patient presents with    Chest Pain     Pt c/o palpitations and chest pain a 30 mins PTA. Pt states he is currently being seen by cardiology and has been having episode similar to this one off and on.      Patient is a 56 year old male with HTN and HLD who presents to ED with complaints of chronic intermittent heart palpitations onset "a while". He had another episode at about 8:00 PM for 1 minute today while laying down in his bathtub. He has seen a cardiologist for this issue and takes Aspirin regularly. He occasionally drinks alcohol but denies any tobacco or recreational drug use. Denies any associated fever, coughing, shortness of breath, nausea, vomiting, chest pain, lightheadedness, dizziness, leg swelling, or weakness.No other complaints at this time.     The history is provided by the patient. No  was used.     Review of patient's allergies indicates:  No Known Allergies  Past Medical History:   Diagnosis Date    Chest pain 10/2019    Cigarette smoker     GERD (gastroesophageal reflux disease)     Hyperlipidemia     used to take medication    Hypertension      Past Surgical History:   Procedure Laterality Date    NO PAST SURGERIES  03/06/2020     No family history on file.  Social History     Tobacco Use    Smoking status: Former Smoker     Packs/day: 0.50     Types: Cigarettes    Smokeless tobacco: Never Used   Substance Use Topics    Alcohol use: Yes     Alcohol/week: 21.0 standard drinks     Types: 21 Cans of beer per week     Comment: 3/6/20: "about 3, everyday after I get off work"    Drug use: No     Review of Systems   Constitutional: Negative for fever.   Respiratory: Negative for cough and shortness of breath.  " Per Dr. Seamus Escobedo EKG each morning only for sotalol loading.   Cardiovascular: Positive for palpitations. Negative for chest pain and leg swelling.   Gastrointestinal: Negative for nausea and vomiting.   Neurological: Negative for dizziness, weakness and light-headedness.   All other systems reviewed and are negative.      Physical Exam     Initial Vitals [07/22/22 2104]   BP Pulse Resp Temp SpO2   (!) 141/74 91 18 98.4 °F (36.9 °C) 98 %      MAP       --         Physical Exam    Nursing note and vitals reviewed.  Constitutional: He appears well-developed and well-nourished. He is not diaphoretic. No distress.   HENT:   Head: Normocephalic and atraumatic.   Mouth/Throat: Oropharynx is clear and moist. Mucous membranes are dry. No oropharyngeal exudate.   Eyes: EOM are normal. Pupils are equal, round, and reactive to light. No scleral icterus.   Neck: Neck supple.   Normal range of motion.  Cardiovascular: Normal rate, regular rhythm and intact distal pulses.   No murmur heard.  Pulses:       Radial pulses are 2+ on the right side and 2+ on the left side.        Dorsalis pedis pulses are 2+ on the right side and 2+ on the left side.   Pulmonary/Chest: Breath sounds normal. No stridor. No respiratory distress. He has no wheezes. He has no rhonchi. He exhibits no tenderness.   Abdominal: Abdomen is soft. Bowel sounds are normal. There is no abdominal tenderness.   Musculoskeletal:         General: No tenderness or edema. Normal range of motion.      Cervical back: Normal range of motion and neck supple.     Neurological: He is alert and oriented to person, place, and time. He has normal strength.   Skin: Skin is warm. No pallor.   Psychiatric: He has a normal mood and affect.         ED Course   Procedures  Labs Reviewed   POCT URINALYSIS W/O SCOPE - Abnormal; Notable for the following components:       Result Value    Blood, UA Trace-intact (*)     All other components within normal limits   POCT CMP - Abnormal; Notable for the following components:    POC Sodium 147 (*)      All other components within normal limits   TROPONIN ISTAT   POCT CBC   POCT URINALYSIS W/O SCOPE   SARS-COV-2 RDRP GENE    Narrative:     This test utilizes isothermal nucleic acid amplification   technology to detect the SARS-CoV-2 RdRp nucleic acid segment.   The analytical sensitivity (limit of detection) is 125 genome   equivalents/mL.   A POSITIVE result implies infection with the SARS-CoV-2 virus;   the patient is presumed to be contagious.     A NEGATIVE result means that SARS-CoV-2 nucleic acids are not   present above the limit of detection. A NEGATIVE result should be   treated as presumptive. It does not rule out the possibility of   COVID-19 and should not be the sole basis for treatment decisions.   If COVID-19 is strongly suspected based on clinical and exposure   history, re-testing using an alternate molecular assay should be   considered.   This test is only for use under the Food and Drug   Administration s Emergency Use Authorization (EUA).   Commercial kits are provided by [employer].   Performance characteristics of the EUA have been independently   verified by [facility] Department of   Pathology and Laboratory Medicine.   _________________________________________________________________   The authorized Fact Sheet for Healthcare Providers and the authorized Fact   Sheet for Patients of the ID NOW COVID-19 are available on the FDA   website:     https://www.fda.gov/media/664796/download  https://www.fda.gov/media/361292/download           POCT CMP   POCT TROPONIN   POCT B-TYPE NATRIURETIC PEPTIDE (BNP)   POCT B-TYPE NATRIURETIC PEPTIDE (BNP)     EKG Readings: (Independently Interpreted)   Initial Reading: No STEMI. Rhythm: Normal Sinus Rhythm. Heart Rate: 87. Ectopy: No Ectopy. Conduction: Normal. ST Segments: Normal ST Segments. T Waves: Normal. Axis: Normal. Clinical Impression: Normal Sinus Rhythm     ECG Results          EKG 12-lead (Final result)  Result time 07/23/22  20:33:39    Final result by Interface, Lab In [facility] (07/23/22 20:33:39)                 Narrative:    Test Reason : R07.9,    Vent. Rate : 087 BPM     Atrial Rate : 087 BPM     P-R Int : 160 ms          QRS Dur : 092 ms      QT Int : 346 ms       P-R-T Axes : 064 021 049 degrees     QTc Int : 416 ms    Normal sinus rhythm  Normal ECG  When compared with ECG of 14-JUN-2022 19:43,  Nonspecific T wave abnormality, improved in Anterior leads  Confirmed by Brett Phillips MD ([id number]) on 7/23/2022 8:33:30 PM    Referred By: [email or web address]   SELF           Confirmed By:Brett Phillips MD                            Imaging Results          X-Ray Chest PA And Lateral (Final result)  Result time 07/22/22 21:39:11    Final result by Fernando Cardoza MD (07/22/22 21:39:11)                 Impression:      There is no radiographic evidence for acute intrathoracic process.      Electronically signed by: Fernando Cardoza  Date:    07/22/2022  Time:    21:39             Narrative:    EXAMINATION:  XR CHEST PA AND LATERAL    CLINICAL HISTORY:  Chest Pain;    TECHNIQUE:  PA and lateral views of the chest were performed.    COMPARISON:  Chest radiograph May 25, 2022    FINDINGS:  Two views of the chest are submitted.  The cardiomediastinal silhouette appears appropriate.  There is no evidence for confluent infiltrate or consolidation, significant pleural effusion or pneumothorax.  The visualized osseous structures appear intact with mild chronic change noted.                                 Medications   aspirin EC tablet 81 mg (81 mg Oral Given 7/22/22 5097)     Medical Decision Making:   History:   Old Medical Records: I decided to obtain old medical records.  Independently Interpreted Test(s):   I have ordered and independently interpreted X-rays - see prior notes.  I have ordered and independently interpreted EKG Reading(s) - see prior notes  Clinical Tests:   Lab Tests: Ordered and Reviewed  Radiological Study: Ordered and  Reviewed  Medical Tests: Ordered and Reviewed    Labs Reviewed            Admission on 07/22/2022, Discharged on 07/23/2022   Component Date Value Ref Range Status    POC Rapid COVID 07/22/2022 Negative  Negative Final     Acceptable 07/22/2022 Yes   Final    Glucose, UA 07/22/2022 Negative   Final    Bilirubin, UA 07/22/2022 Negative   Final    Ketones, UA 07/22/2022 Negative   Final    Spec Grav UA 07/22/2022 1.010   Final    Blood, UA 07/22/2022 Trace-intact (A)  Final    PH, UA 07/22/2022 6.0   Final    Protein, UA 07/22/2022 Negative   Final    Urobilinogen, UA 07/22/2022 0.2  E.U./dL Final    Nitrite, UA 07/22/2022 Negative   Final    Leukocytes, UA 07/22/2022 Negative   Final    Color, UA 07/22/2022 Yellow   Final    Clarity, UA 07/22/2022 Clear   Final    Albumin, POC 07/22/2022 3.7  3.3 - 5.5 g/dL Final    Alkaline Phosphatase, POC 07/22/2022 87  42 - 141 U/L Final    ALT (SGPT), POC 07/22/2022 30  10 - 47 U/L Final    AST (SGOT), POC 07/22/2022 29  11 - 38 U/L Final    POC BUN 07/22/2022 10  7 - 22 mg/dL Final    Calcium, POC 07/22/2022 10.0  8.0 - 10.3 mg/dL Final    POC Chloride 07/22/2022 106  98 - 108 mmol/L Final    POC Creatinine 07/22/2022 0.9  0.6 - 1.2 mg/dL Final    POC Glucose 07/22/2022 108  73 - 118 mg/dL Final    POC Potassium 07/22/2022 4.7  3.6 - 5.1 mmol/L Final    POC Sodium 07/22/2022 147 (A) 128 - 145 mmol/L Final    Bilirubin, POC 07/22/2022 0.4  0.2 - 1.6 mg/dL Final    POC TCO2 07/22/2022 30  18 - 33 mmol/L Final    Protein, POC 07/22/2022 7.7  6.4 - 8.1 g/dL Final    POC Cardiac Troponin I 07/22/2022 0.00  <0.09 ng/mL Final    Sample 07/22/2022 unknown   Final    Comment: A single negative troponin is insufficient to rule out myocardial infarction.  The use of a serial sampling protocol is recommended practice. Correlate results with reference intervals established for methodology used. Point of care and core laboratory   troponin results  are not interchangeable.      POC B-Type Natriuretic Peptide 07/22/2022 5.9  0.0 - 100.0 pg/mL Final        Imaging Reviewed    Imaging Results          X-Ray Chest PA And Lateral (Final result)  Result time 07/22/22 21:39:11    Final result by Fernando Cardoza MD (07/22/22 21:39:11)                 Impression:      There is no radiographic evidence for acute intrathoracic process.      Electronically signed by: Fernando Cardoza  Date:    07/22/2022  Time:    21:39             Narrative:    EXAMINATION:  XR CHEST PA AND LATERAL    CLINICAL HISTORY:  Chest Pain;    TECHNIQUE:  PA and lateral views of the chest were performed.    COMPARISON:  Chest radiograph May 25, 2022    FINDINGS:  Two views of the chest are submitted.  The cardiomediastinal silhouette appears appropriate.  There is no evidence for confluent infiltrate or consolidation, significant pleural effusion or pneumothorax.  The visualized osseous structures appear intact with mild chronic change noted.                                Medications given in ED    Medications   aspirin EC tablet 81 mg (81 mg Oral Given 7/22/22 2379)         Note was created using voice recognition software. Note may have occasional typographical errors that may not have been identified and edited despite good maximus initial review prior to signing.    I, Vesna Younger MD, personally performed the services described in this documentation. All medical record entries made by the scribe were at my direction and in my presence.  I have reviewed the chart and agree that the record reflects my personal performance and is accurate and complete.                      Clinical Impression:   Final diagnoses:  [R07.9] Chest pain  [R00.2] Palpitations (Primary)          ED Disposition Condition    Discharge Stable        ED Prescriptions     None        Follow-up Information     Follow up With Specialties Details Why Contact [facility] [place]  Call  Monday morning, to  schedule an appointment, for re-evaluation of today's complaint, and ongoing care [address]  [place] [address]  [phone]      Your Cardiologist  Go to  as previously scheduled for cardiac event monitoring            Vesna Younger MD  07/25/22 [id number]

## 2022-09-15 PROCEDURE — 99285 EMERGENCY DEPT VISIT HI MDM: CPT | Mod: 25,ER

## 2022-09-15 PROCEDURE — 36000 PLACE NEEDLE IN VEIN: CPT | Mod: ER

## 2022-09-16 ENCOUNTER — HOSPITAL ENCOUNTER (EMERGENCY)
Facility: HOSPITAL | Age: 57
Discharge: HOME | End: 2022-09-16
Attending: EMERGENCY MEDICINE
Payer: MEDICAID

## 2022-09-16 VITALS
SYSTOLIC BLOOD PRESSURE: 139 MMHG | BODY MASS INDEX: 31.5 KG/M2 | OXYGEN SATURATION: 98 % | TEMPERATURE: 99 F | WEIGHT: 225 LBS | RESPIRATION RATE: 16 BRPM | HEIGHT: 71 IN | HEART RATE: 60 BPM | DIASTOLIC BLOOD PRESSURE: 75 MMHG

## 2022-09-16 DIAGNOSIS — R07.89 LEFT-SIDED CHEST WALL PAIN: Primary | ICD-10-CM

## 2022-09-16 DIAGNOSIS — S46.912A SHOULDER STRAIN, LEFT, INITIAL ENCOUNTER: ICD-10-CM

## 2022-09-16 DIAGNOSIS — R07.9 CHEST PAIN: ICD-10-CM

## 2022-09-16 LAB
ALBUMIN SERPL-MCNC: 3.8 G/DL (ref 3.3–5.5)
ALP SERPL-CCNC: 72 U/L (ref 42–141)
BILIRUB SERPL-MCNC: 0.5 MG/DL (ref 0.2–1.6)
BUN SERPL-MCNC: 11 MG/DL (ref 7–22)
CALCIUM SERPL-MCNC: 10.2 MG/DL (ref 8–10.3)
CHLORIDE SERPL-SCNC: 107 MMOL/L (ref 98–108)
CREAT SERPL-MCNC: 0.8 MG/DL (ref 0.6–1.2)
GLUCOSE SERPL-MCNC: 100 MG/DL (ref 73–118)
HCT, POC: NORMAL
HGB, POC: NORMAL (ref 14–18)
MCH, POC: NORMAL
MCHC, POC: NORMAL
MCV, POC: NORMAL
MPV, POC: NORMAL
POC ALT (SGPT): 30 U/L (ref 10–47)
POC AST (SGOT): 30 U/L (ref 11–38)
POC CARDIAC TROPONIN I: 0 NG/ML
POC PLATELET COUNT: NORMAL
POC TCO2: 25 MMOL/L (ref 18–33)
POTASSIUM BLD-SCNC: 4.3 MMOL/L (ref 3.6–5.1)
PROTEIN, POC: 7.7 G/DL (ref 6.4–8.1)
RBC, POC: NORMAL
RDW, POC: NORMAL
SAMPLE: NORMAL
SODIUM BLD-SCNC: 144 MMOL/L (ref 128–145)
WBC, POC: NORMAL

## 2022-09-16 PROCEDURE — 85025 COMPLETE CBC W/AUTO DIFF WBC: CPT | Mod: ER

## 2022-09-16 PROCEDURE — 93010 EKG 12-LEAD: ICD-10-PCS | Mod: ,,, | Performed by: INTERNAL MEDICINE

## 2022-09-16 PROCEDURE — 93005 ELECTROCARDIOGRAM TRACING: CPT | Mod: ER

## 2022-09-16 PROCEDURE — 80053 COMPREHEN METABOLIC PANEL: CPT | Mod: ER

## 2022-09-16 PROCEDURE — 84484 ASSAY OF TROPONIN QUANT: CPT | Mod: ER

## 2022-09-16 PROCEDURE — 93010 ELECTROCARDIOGRAM REPORT: CPT | Mod: ,,, | Performed by: INTERNAL MEDICINE

## 2022-09-16 PROCEDURE — 25000003 PHARM REV CODE 250: Mod: ER | Performed by: EMERGENCY MEDICINE

## 2022-09-16 RX ORDER — METHOCARBAMOL 750 MG/1
1500 TABLET, FILM COATED ORAL EVERY 6 HOURS
Qty: 24 TABLET | Refills: 0 | Status: SHIPPED | OUTPATIENT
Start: 2022-09-16 | End: 2022-09-19

## 2022-09-16 RX ORDER — DICLOFENAC SODIUM 10 MG/G
GEL TOPICAL
Qty: 100 G | Refills: 0 | Status: SHIPPED | OUTPATIENT
Start: 2022-09-16 | End: 2023-05-24

## 2022-09-16 RX ORDER — KETOROLAC TROMETHAMINE 10 MG/1
10 TABLET, FILM COATED ORAL EVERY 6 HOURS PRN
Qty: 12 TABLET | Refills: 0 | Status: SHIPPED | OUTPATIENT
Start: 2022-09-16 | End: 2022-09-19

## 2022-09-16 RX ORDER — ASPIRIN 325 MG
325 TABLET ORAL
Status: COMPLETED | OUTPATIENT
Start: 2022-09-16 | End: 2022-09-16

## 2022-09-16 RX ORDER — PREDNISONE 20 MG/1
40 TABLET ORAL DAILY
Qty: 10 TABLET | Refills: 0 | Status: SHIPPED | OUTPATIENT
Start: 2022-09-16 | End: 2022-09-21

## 2022-09-16 RX ADMIN — ASPIRIN 325 MG ORAL TABLET 325 MG: 325 PILL ORAL at 02:09

## 2022-09-16 NOTE — ED PROVIDER NOTES
"Encounter Date: 9/15/2022       History     Chief Complaint   Patient presents with    Shoulder Pain     Pt c/o L shoulder pain x 2 days; denies injury/trauma     56 y.o. male with HTN, tobacco abuse, hyperlipidemia and others presents emergency department complaining of acute on chronic, nontraumatic, left shoulder pain that radiates to the left, lateral chest wall that began over a year ago.  He states pain is provoked by the work he does at his job which requires holding heavy objects above his head for minutes at a time.  He reports undergoing PT for this issue in the past without improvement of symptoms.     The history is provided by the patient.   Review of patient's allergies indicates:  No Known Allergies  Past Medical History:   Diagnosis Date    Chest pain 10/2019    Cigarette smoker     GERD (gastroesophageal reflux disease)     Hyperlipidemia     used to take medication    Hypertension      Past Surgical History:   Procedure Laterality Date    NO PAST SURGERIES  03/06/2020     History reviewed. No pertinent family history.  Social History     Tobacco Use    Smoking status: Former     Packs/day: 0.50     Types: Cigarettes    Smokeless tobacco: Never   Substance Use Topics    Alcohol use: Yes     Alcohol/week: 21.0 standard drinks     Types: 21 Cans of beer per week     Comment: 3/6/20: "about 3, everyday after I get off work"    Drug use: No     Review of Systems   Constitutional:  Negative for fatigue and fever.   Respiratory:  Negative for cough and shortness of breath.    Cardiovascular:  Positive for chest pain.   Gastrointestinal:  Negative for nausea and vomiting.   Musculoskeletal:  Positive for arthralgias and myalgias. Negative for back pain, gait problem and joint swelling.   Neurological:  Negative for weakness and numbness.   All other systems reviewed and are negative.    Physical Exam     Initial Vitals [09/15/22 2227]   BP Pulse Resp Temp SpO2   (!) 156/89 85 20 98.5 °F (36.9 °C) 99 %    "   MAP       --         Physical Exam    Nursing note and vitals reviewed.  Constitutional: He appears well-developed and well-nourished. He is not diaphoretic. No distress.   HENT:   Head: Normocephalic and atraumatic.   Mouth/Throat: Oropharynx is clear and moist.   Eyes: Conjunctivae are normal.   Neck: Phonation normal. No stridor present.   Normal range of motion.  Cardiovascular:  Regular rhythm and intact distal pulses.           Pulmonary/Chest: Effort normal and breath sounds normal. No accessory muscle usage or stridor. No tachypnea. No respiratory distress. He has no wheezes. He has no rhonchi. He has no rales. He exhibits tenderness. He exhibits no crepitus, no edema and no swelling.     Abdominal: He exhibits no distension. There is no abdominal tenderness.   Musculoskeletal:         General: No tenderness. Normal range of motion.      Cervical back: Normal range of motion.     Neurological: He is alert and oriented to person, place, and time. He has normal strength. Gait normal. GCS eye subscore is 4. GCS verbal subscore is 5. GCS motor subscore is 6.   Skin: Skin is warm and intact. No abrasion, no bruising and no rash noted. No erythema.   Psychiatric: He has a normal mood and affect.       ED Course   Procedures  Labs Reviewed   TROPONIN ISTAT   POCT CBC   POCT CMP   POCT TROPONIN   POCT CMP     EKG Readings: (Independently Interpreted)   Initial Reading: No STEMI. Rhythm: Normal Sinus Rhythm. Heart Rate: 68. Ectopy: No Ectopy. Conduction: Normal. ST Segments: Normal ST Segments. T Waves: Normal. Axis: Normal. Clinical Impression: Normal Sinus Rhythm   ECG Results              EKG 12-lead (Final result)  Result time 09/18/22 22:05:11      Final result by Interface, Lab In [facility] (09/18/22 22:05:11)                   Narrative:    Test Reason : L shoulder pain    Vent. Rate : 068 BPM     Atrial Rate : 068 BPM     P-R Int : 168 ms          QRS Dur : 080 ms      QT Int : 388 ms       P-R-T Axes : 053  005 013 degrees     QTc Int : 412 ms    Normal sinus rhythm  Nonspecific T wave abnormality  Abnormal ECG  When compared with ECG of 22-JUL-2022 20:56,  No significant change was found  Confirmed by Jacqueline HERNANDEZ, Brett OLIVIER ([id number]) on 9/18/2022 10:05:03 PM    Referred By: MARAH   SELF           Confirmed By:Brett Phillips MD                                  Imaging Results              X-Ray Chest PA And Lateral (Final result)  Result time 09/16/22 03:24:22      Final result by Enrique Barker MD (09/16/22 03:24:22)                   Impression:      No radiographic evidence of acute intrathoracic process.      Electronically signed by: Enrique Barker MD  Date:    09/16/2022  Time:    03:24               Narrative:    EXAMINATION:  XR CHEST PA AND LATERAL    CLINICAL HISTORY:  Chest pain, unspecified    TECHNIQUE:  PA and lateral views of the chest were performed.    COMPARISON:  07/22/2022    FINDINGS:  Cardiac monitoring leads overlie the chest.  The cardiomediastinal silhouette appears within normal limits.  The lungs are symmetrically aerated without evidence of focal airspace consolidation.  There is no pleural effusion or pneumothorax.  Visualized osseous structures appear intact.                                       X-Ray Shoulder 2 or More Views Left (Final result)  Result time 09/15/22 23:35:13      Final result by Nanette Jones MD (09/15/22 23:35:13)                   Impression:      No acute bony abnormality detected.      Electronically signed by: Nanette Jones  Date:    09/15/2022  Time:    23:35               Narrative:    EXAMINATION:  TWO OR MORE VIEWS OF THE LEFT SHOULDER    CLINICAL HISTORY:  pain;    TECHNIQUE:  Two or more views of the left shoulder    COMPARISON:  04/28/2022    FINDINGS:  Two or more views of the left shoulder demonstrate no acute fracture or dislocation.                                       Medications   aspirin tablet 325 mg (325 mg Oral Given 9/16/22 0230)                             Labs Reviewed      Admission on 09/16/2022, Discharged on 09/16/2022   Component Date Value Ref Range Status    POC Cardiac Troponin I 09/16/2022 0.00  <0.09 ng/mL Final    Sample 09/16/2022 unknown   Final    Comment: A single negative troponin is insufficient to rule out myocardial infarction.  The use of a serial sampling protocol is recommended practice. Correlate results with reference intervals established for methodology used. Point of care and core laboratory   troponin results are not interchangeable.      Albumin, POC 09/16/2022 3.8  3.3 - 5.5 g/dL Final    Alkaline Phosphatase, POC 09/16/2022 72  42 - 141 U/L Final    ALT (SGPT), POC 09/16/2022 30  10 - 47 U/L Final    AST (SGOT), POC 09/16/2022 30  11 - 38 U/L Final    POC BUN 09/16/2022 11  7 - 22 mg/dL Final    Calcium, POC 09/16/2022 10.2  8.0 - 10.3 mg/dL Final    POC Chloride 09/16/2022 107  98 - 108 mmol/L Final    POC Creatinine 09/16/2022 0.8  0.6 - 1.2 mg/dL Final    POC Glucose 09/16/2022 100  73 - 118 mg/dL Final    POC Potassium 09/16/2022 4.3  3.6 - 5.1 mmol/L Final    POC Sodium 09/16/2022 144  128 - 145 mmol/L Final    Bilirubin, POC 09/16/2022 0.5  0.2 - 1.6 mg/dL Final    POC TCO2 09/16/2022 25  18 - 33 mmol/L Final    Protein, POC 09/16/2022 7.7  6.4 - 8.1 g/dL Final        Imaging Reviewed    Imaging Results              X-Ray Chest PA And Lateral (Final result)  Result time 09/16/22 03:24:22      Final result by Enrique Barker MD (09/16/22 03:24:22)                   Impression:      No radiographic evidence of acute intrathoracic process.      Electronically signed by: Enrique Barker MD  Date:    09/16/2022  Time:    03:24               Narrative:    EXAMINATION:  XR CHEST PA AND LATERAL    CLINICAL HISTORY:  Chest pain, unspecified    TECHNIQUE:  PA and lateral views of the chest were performed.    COMPARISON:  07/22/2022    FINDINGS:  Cardiac monitoring leads overlie the chest.  The cardiomediastinal  silhouette appears within normal limits.  The lungs are symmetrically aerated without evidence of focal airspace consolidation.  There is no pleural effusion or pneumothorax.  Visualized osseous structures appear intact.                                       X-Ray Shoulder 2 or More Views Left (Final result)  Result time 09/15/22 23:35:13      Final result by Nanette Jones MD (09/15/22 23:35:13)                   Impression:      No acute bony abnormality detected.      Electronically signed by: Nanette Jones  Date:    09/15/2022  Time:    23:35               Narrative:    EXAMINATION:  TWO OR MORE VIEWS OF THE LEFT SHOULDER    CLINICAL HISTORY:  pain;    TECHNIQUE:  Two or more views of the left shoulder    COMPARISON:  2022    FINDINGS:  Two or more views of the left shoulder demonstrate no acute fracture or dislocation.                                      Medications given in ED    Medications   aspirin tablet 325 mg (325 mg Oral Given 22 0230)       Note was created using voice recognition software. Note may have occasional typographical errors that may not have been identified and edited despite good maximus initial review prior to signing.    Clinical Impression:   Final diagnoses:  [R07.9] Chest pain  [R07.89] Left-sided chest wall pain (Primary)  [S46.912A] Shoulder strain, left, initial encounter      ED Disposition Condition    Discharge Stable          ED Prescriptions       Medication Sig Dispense Start Date End Date Auth. Provider    predniSONE (DELTASONE) 20 MG tablet Take 2 tablets (40 mg total) by mouth once daily. for 5 days 10 tablet 2022 Vesna Younger MD    diclofenac sodium (VOLTAREN) 1 % Gel Apply 2g  to affected area every 6 hr as needed for pain. 100 g 2022 -- Vesna Younger MD    methocarbamoL (ROBAXIN) 750 MG Tab () Take 2 tablets (1,500 mg total) by mouth every 6 (six) hours. for 3 days 24 tablet 2022 Vesna Younger MD     ketorolac (TORADOL) 10 mg tablet () Take 1 tablet (10 mg total) by mouth every 6 (six) hours as needed for Pain (take with food). 12 tablet 2022 Vesna Younger MD          Follow-up Information       Follow up With Specialties Details Why Contact Info    [facility] [place]  Call  As needed, for ongoing care [address] [place] [id number] [phone]      The nearest emergency department.  Go to  As needed, If symptoms worsen              Vesna Younger MD  [phone]

## 2022-09-16 NOTE — ED NOTES
All tests resulted. Chart up for re-evaluation with ER provider.   Pt stable. No acute distress noted.   Pt updated on plan of care. Verbal understanding.   Denies any needs at present.

## 2022-10-15 ENCOUNTER — HOSPITAL ENCOUNTER (EMERGENCY)
Facility: HOSPITAL | Age: 57
Discharge: HOME | End: 2022-10-15
Attending: EMERGENCY MEDICINE
Payer: MEDICAID

## 2022-10-15 VITALS
WEIGHT: 225 LBS | BODY MASS INDEX: 31.5 KG/M2 | HEART RATE: 66 BPM | TEMPERATURE: 98 F | OXYGEN SATURATION: 97 % | RESPIRATION RATE: 17 BRPM | DIASTOLIC BLOOD PRESSURE: 89 MMHG | HEIGHT: 71 IN | SYSTOLIC BLOOD PRESSURE: 151 MMHG

## 2022-10-15 DIAGNOSIS — M25.512 ACUTE PAIN OF LEFT SHOULDER: Primary | ICD-10-CM

## 2022-10-15 DIAGNOSIS — R07.9 CHEST PAIN: ICD-10-CM

## 2022-10-15 LAB
ALBUMIN SERPL-MCNC: 4.1 G/DL (ref 3.3–5.5)
ALP SERPL-CCNC: 76 U/L (ref 42–141)
BILIRUB SERPL-MCNC: 0.6 MG/DL (ref 0.2–1.6)
BUN SERPL-MCNC: 10 MG/DL (ref 7–22)
CALCIUM SERPL-MCNC: 10.3 MG/DL (ref 8–10.3)
CHLORIDE SERPL-SCNC: 105 MMOL/L (ref 98–108)
CREAT SERPL-MCNC: 0.9 MG/DL (ref 0.6–1.2)
GLUCOSE SERPL-MCNC: 97 MG/DL (ref 73–118)
POC ALT (SGPT): 26 U/L (ref 10–47)
POC AST (SGOT): 29 U/L (ref 11–38)
POC B-TYPE NATRIURETIC PEPTIDE: 10 PG/ML (ref 0–100)
POC CARDIAC TROPONIN I: 0 NG/ML
POC CARDIAC TROPONIN I: 0 NG/ML
POC PTINR: 1.1 (ref 0.9–1.2)
POC PTWBT: 13.2 SEC (ref 9.7–14.3)
POC TCO2: 28 MMOL/L (ref 18–33)
POTASSIUM BLD-SCNC: 4.3 MMOL/L (ref 3.6–5.1)
PROTEIN, POC: 8.2 G/DL (ref 6.4–8.1)
SAMPLE: NORMAL
SODIUM BLD-SCNC: 144 MMOL/L (ref 128–145)

## 2022-10-15 PROCEDURE — 93005 ELECTROCARDIOGRAM TRACING: CPT | Mod: ER

## 2022-10-15 PROCEDURE — 93010 ELECTROCARDIOGRAM REPORT: CPT | Mod: 59,,, | Performed by: INTERNAL MEDICINE

## 2022-10-15 PROCEDURE — 93010 ELECTROCARDIOGRAM REPORT: CPT | Mod: ,,, | Performed by: INTERNAL MEDICINE

## 2022-10-15 PROCEDURE — 80053 COMPREHEN METABOLIC PANEL: CPT | Mod: ER

## 2022-10-15 PROCEDURE — 84484 ASSAY OF TROPONIN QUANT: CPT | Mod: ER

## 2022-10-15 PROCEDURE — 93010 EKG 12-LEAD: ICD-10-PCS | Mod: ,,, | Performed by: INTERNAL MEDICINE

## 2022-10-15 PROCEDURE — 83880 ASSAY OF NATRIURETIC PEPTIDE: CPT | Mod: ER

## 2022-10-15 PROCEDURE — 99285 EMERGENCY DEPT VISIT HI MDM: CPT | Mod: 25,ER

## 2022-10-15 PROCEDURE — 85025 COMPLETE CBC W/AUTO DIFF WBC: CPT | Mod: ER

## 2022-10-15 PROCEDURE — 85610 PROTHROMBIN TIME: CPT | Mod: ER

## 2022-10-15 NOTE — DISCHARGE INSTRUCTIONS
Please call your cardiologist Monday morning to set up an appointment for follow-up.    Thank you for coming to our Emergency Department today. It is important to remember that some problems or medical conditions are difficult to diagnose and may not be found during your Emergency Department visit.     Be sure to follow up with your primary care doctor and review all labs/imaging/tests that were performed during your ER visit with them. Some labs/tests may be outside of the normal range and require non-emergent follow-up and further investigation to help diagnose/exclude/prevent complications or other potentially serious medical conditions that were not addressed during your ER visit.    If you do not have a primary care doctor, you may contact the one listed on your discharge paperwork or you may also call the [facility] Appointment Desk at [phone] to schedule an appointment and establish care with one. It is important to your health that you have a primary care doctor.    Please take all medications as directed. All medications may potentially have side-effects and it is impossible to predict which medications may give you side-effects or what side-effects (if any) they will give you.. If you feel that you are having a negative effect or side-effect of any medication you should immediately stop taking them and seek medical attention. If you feel that you are having a life-threatening reaction call 911.    Return to the ER with any questions/concerns, new/concerning symptoms, worsening or failure to improve.     Do not drive, swim, climb to height, take a bath, operate heavy machinery, drink alcohol or take potentially sedating medications, sign any legal documents or make any important decisions for 24 hours if you have received any pain medications, sedatives or mood altering drugs during your ER visit or within 24 hours of taking them if they have been prescribed to you.     You can find additional  resources for Dentists, hearing aids, durable medical equipment, low cost pharmacies and other resources at [email or web address]    BELOW THIS LINE ONLY APPLIES IF YOU HAVE A COVID TEST PENDING OR IF YOU HAVE BEEN DIAGNOSED WITH COVID:  Please access [facility] to review the results of your test. Until the results of your COVID test return, you should isolate yourself so as not to potentially spread illness to others.   If your COVID test returns positive, you should isolate yourself so as not to spread illness to others. After five full days, if you are feeling better and you have not had fever for 24 hours, you can return to your typical daily activities, but you must wear a mask around others for an additional 5 days.   If your COVID test returns negative and you are either unvaccinated or more than six months out from your two-dose vaccine and are not yet boosted, you should still quarantine for 5 full days followed by strict mask use for an additional 5 full days.   If your COVID test returns negative and you have received your 2-dose initial vaccine as well as a booster, you should continue strict mask use for 10 full days after the exposure.  For all those exposed, best practice includes a test at day 5 after the exposure. This can be a home test or a test through one of the many testing centers throughout our community.   Masking is always advised to limit the spread of COVID. Cdc.gov is an excellent site to obtain the latest up to date recommendations regarding COVID and COVID testing.     CDC Testing and Quarantine Guidelines for patients with exposure to a known-positive COVID-19 person:  A close exposure is defined as anyone who has had an exposure (masked or unmasked) to a known COVID -19 positive person within 6 feet of someone for a cumulative total of 15 minutes or more over a 24-hour period.   Vaccinated and/or if you recently had a positive covid test within 90 days do NOT need to quarantine  after contact with someone who had COVID-19 unless you develop symptoms.   Fully vaccinated people who have not had a positive test within 90 days, should get tested 3-5 days after their exposure, even if they don't have symptoms and wear a mask indoors in public for 14 days following exposure or until their test result is negative.      Unvaccinated and/or NOT had a positive test within 90 days and meet close exposure  You are required by CDC guidelines to quarantine for at least 5 days from time of exposure followed by 5 days of strict masking. It is recommended, but not required to test after 5 days, unless you develop symptoms, in which case you should test at that time.  If you get tested after 5 days and your test is positive, your 5 day period of isolation starts the day of the positive test.    If your exposure does not meet the above definition, you can return to your normal daily activities to include social distancing, wearing a mask and frequent handwashing.      Here is a link to guidance from the CDC:  https://www.cdc.gov/media/releases/2021/s1227-isolation-quarantine-guidance.html      [facility] Testing Sites:  https://ldh.la.gov/page/3934      [facility] website with testing locations and guidance:  [email or web address]

## 2022-10-15 NOTE — ED PROVIDER NOTES
Encounter Date: 10/15/2022    SCRIBE #1 NOTE: I, Radha Nohelia, am scribing for, and in the presence of,  Kandi Patel NP. I have scribed the following portions of the note - Other sections scribed: HPI, ROS, PE.     History     Chief Complaint   Patient presents with    Shoulder Pain    Chest Pain     Pt states hx of arthritis to left shoulder, he states he woke up with pain to shoulder and left side of chest.  States he has been lifting a lot at work.  Currently denies pain.     Braeden Rowland 56 y.o. male, with PMHx of HTN, HLD, GERD, Chest pain, and others, presents to the ED with left shoulder pain and left sided chest pain onset one hour ago. Patient reports that he was watching TV when the shoulder and chest pain occurred. Patient describes the pain in his shoulder as tightness. Patient reports that the pain lasted 1-2 minutes. Patient attempted treatment with Aspirin and had relief. Patient states that he has had this pain before. Patient states that his cardiologist ran many tests on him which all resulted normal. Patient denies fever, nausea, vomiting, shortness of breath, cough, numbness, or other associated symptoms. No known alleviating or aggravating factors. Patient reports taking medication for HTN, HLD, and GERD. Patient reports that he drinks beers. Patient denies tobacco use. Patient has NKDA.    The history is provided by the patient. No  was used.   Review of patient's allergies indicates:  No Known Allergies  Past Medical History:   Diagnosis Date    Chest pain 10/2019    Cigarette smoker     GERD (gastroesophageal reflux disease)     Hyperlipidemia     used to take medication    Hypertension      Past Surgical History:   Procedure Laterality Date    NO PAST SURGERIES  03/06/2020     History reviewed. No pertinent family history.  Social History     Tobacco Use    Smoking status: Former     Packs/day: 0.50     Types: Cigarettes    Smokeless tobacco: Never   Substance Use  "Topics    Alcohol use: Yes     Alcohol/week: 21.0 standard drinks     Types: 21 Cans of beer per week     Comment: 3/6/20: "about 3, everyday after I get off work"    Drug use: No     Review of Systems   Constitutional:  Negative for fever.   HENT:  Negative for sore throat.    Eyes:  Negative for photophobia.   Respiratory:  Negative for cough.    Cardiovascular:  Positive for chest pain.   Gastrointestinal:  Negative for nausea and vomiting.   Genitourinary:  Negative for dysuria.   Musculoskeletal:  Positive for myalgias.   Skin:  Negative for rash.   Neurological:  Negative for numbness.   All other systems reviewed and are negative.    Physical Exam     Initial Vitals [10/15/22 0941]   BP Pulse Resp Temp SpO2   (!) 157/89 74 16 98 °F (36.7 °C) 99 %      MAP       --         Physical Exam    Vitals reviewed.  Constitutional: He appears well-developed and well-nourished. He is not diaphoretic.  Non-toxic appearance. He does not have a sickly appearance. He does not appear ill. No distress.   HENT:   Head: Normocephalic and atraumatic.   Right Ear: External ear normal.   Left Ear: External ear normal.   Neck:   Normal range of motion.  Cardiovascular:  Normal rate, regular rhythm, normal heart sounds and intact distal pulses.           Pulmonary/Chest: No respiratory distress.   Musculoskeletal:         General: Normal range of motion.      Left shoulder: Normal. No tenderness. Normal range of motion.      Cervical back: Normal range of motion.     Neurological: He is alert and oriented to person, place, and time. GCS eye subscore is 4. GCS verbal subscore is 5. GCS motor subscore is 6.   Skin: Skin is warm, dry and intact. No rash noted. No erythema.   Psychiatric: He has a normal mood and affect. Thought content normal.       ED Course   Procedures  Labs Reviewed   POCT CMP - Abnormal; Notable for the following components:       Result Value    Protein, POC 8.2 (*)     All other components within normal limits "   TROPONIN ISTAT   TROPONIN ISTAT   POCT CBC   POCT CMP   POCT TROPONIN   POCT B-TYPE NATRIURETIC PEPTIDE (BNP)   POCT PROTIME-INR   ISTAT PROCEDURE   POCT B-TYPE NATRIURETIC PEPTIDE (BNP)   POCT TROPONIN       EKG Readings: (Independently Interpreted)   Initial Reading: No STEMI. Previous EKG: Compared with most recent EKG Previous EKG Date: 9/16/2022. Rhythm: Normal Sinus Rhythm. Heart Rate: 70. Ectopy: No Ectopy. T Waves Flipped: V4, III and V3.     Imaging Results              X-Ray Chest PA And Lateral (Final result)  Result time 10/15/22 10:50:14      Final result by Leigh Oneal MD (10/15/22 10:50:14)                   Impression:      No acute cardiopulmonary disease.      Electronically signed by: Leigh Oneal  Date:    10/15/2022  Time:    10:50               Narrative:    EXAMINATION:  XR CHEST PA AND LATERAL    CLINICAL HISTORY:  Chest Pain;    TECHNIQUE:  PA and lateral views of the chest were performed.    COMPARISON:  09/16/2022    FINDINGS:  The lungs are clear.  No pleural effusion.  Cardiomediastinal silhouette is within normal limits.                                       Medications - No data to display  Medical Decision Making:   History:   Old Medical Records: I decided to obtain old medical records.  Clinical Tests:   Lab Tests: Ordered and Reviewed  Radiological Study: Ordered and Reviewed  ED Management:  56 year old patient with hx of hypertension and hyperlipidemia presenting secondary to chest pain and left shoulder pain at rest.  Last a few minutes and resolved prior to arrival. Patient is at lower risk of ACS due to risk factors and HPI with a heart score of 3.  Patient took an aspirin at home.  EKG was reassuring and chest xray showed nothing acute. Most likely musculoskeletal cause of patient.     [email or web address]    Also considered but less likely:     PE: normal rate, no sob/recent immobilization/surgery/travel/family history.  Fermin  criteria for PE is zero.   Pneumonia: chest xray negative. No fever. No cough and lungs non consistent with pna  Tamponade: unlikely due to chest xray and ekg  STEMI: No STEMI on ekg  Dissection: equal pulses bilaterally and no ripping chest pain to the back  Esophageal rupture: no dysphagia or vomiting and chest xray negative for mediastinal air  Arrhythmia: no arrhythmia on ekg  CHF: no fluid overload on Cxr and physical exam  Pneumothorax: bilateral breath sounds and no signs of pneumothorax on chest xray     Patient remains asymptomatic in the ED and has a lower risk of impending cardiac failure to the heart score of 3. Troponin x2 negative. Repeat EKG reassuring. Patient was discharged with follow up with Dr Davenport. Return precautions given, patient understands and agrees with plan. All questions answered.     This case was discussed with my attending physician who agrees my plan of care.  Additional MDM:   Heart Score:    History:          Slightly suspicious.  ECG:             Nonspecific repolarisation disturbance  Age:               45-65 years  Risk factors: 1-2 risk factors  Troponin:       Less than or equal to normal limit  Final Score: 3       Scribe Attestation:   Scribe #1: I performed the above scribed service and the documentation accurately describes the services I performed. I attest to the accuracy of the note.                  Scribe attestation: I, BRENDA Patel, personally performed the services described in this documentation. All medical record entries made by the scribe were at my direction and in my presence.  I have reviewed the chart and agree that the record reflects my personal performance and is accurate and complete.   Clinical Impression:   Final diagnoses:  [R07.9] Chest pain  [M25.512] Acute pain of left shoulder (Primary)      ED Disposition Condition    Discharge Stable          ED Prescriptions    None       Follow-up Information       Follow up With Specialties Details Why  Contact Info    Tessa Curtis NP-C Urgent Care Schedule an appointment as soon as possible for a visit in 2 days For follow-up [address] [facility] [address]  [phone]      Misael Landry MD Cardiology, Interventional Cardiology Schedule an appointment as soon as possible for a visit in 2 days For follow-up [address] [facility] [address]  [phone]      [facility] ED Emergency Medicine Go to  If symptoms worsen [address] [facility] [address] [phone]             Kandi Patel NP  10/15/22 1551

## 2022-12-19 ENCOUNTER — CLINICAL SUPPORT (OUTPATIENT)
Dept: REHABILITATION | Facility: HOSPITAL | Age: 57
End: 2022-12-19
Payer: MEDICAID

## 2022-12-19 DIAGNOSIS — R29.898 DECREASED STRENGTH OF UPPER EXTREMITY: ICD-10-CM

## 2022-12-19 DIAGNOSIS — R29.3 POSTURAL IMBALANCE: Primary | ICD-10-CM

## 2022-12-19 PROCEDURE — 97161 PT EVAL LOW COMPLEX 20 MIN: CPT | Mod: PN

## 2022-12-19 PROCEDURE — 97110 THERAPEUTIC EXERCISES: CPT | Mod: PN

## 2022-12-19 NOTE — PROGRESS NOTES
[facility]   Physical Therapy Initial Evaluation     Date: 12/19/2022   Name: Braeden Rowland  Clinic Number: [id number]    Therapy Diagnosis:   Encounter Diagnoses   Name Primary?    Decreased strength of upper extremity     Postural imbalance Yes     Physician: Nubia Rome FNP-C    Physician Orders: PT Eval and Treat   Medical Diagnosis from Referral: M19.012 (ICD-10-CM) - Arthritis of left shoulder region  Evaluation Date: 12/19/2022  Authorization Period Expiration: 12/31/2022  Plan of Care Expiration: 3/19/23  Progress Note Due: 1/19/23  Visit # / Visits authorized: 1/ 1   FOTO: 1/5    Precautions: Standard and HTN      Time In: 1743 (late)  Time Out: 1823  Total Appointment Time (timed & untimed codes): 40 minutes      SUBJECTIVE     Date of onset: chronic pain    History of current condition - Braeden reports: His shoulder is some what sore but he really has more pain around the anterior lateral portion that he localizes to his serratus anterior on the Left. He denies any numbness or tingling. He has an increase in pain when he lifts up on something heavy at work. If he has his arm above his head for a long time he will have to take a break but he reports that he is able to do his job. They gave him a cream that he has been putting on. Pt is right hand dominant. When he goes to the gym he does a lot of overhead exercises but his doctor wants him to stop that for now and put on the cream.     Falls: none    Imaging, x-ray per Nanettejovan Jones: Impression:     No acute bony abnormality detected.       Prior Therapy: PT for left shoulder about a year ago  Social History:  lives with their family  Occupation: contractor   Prior Level of Function: independent  DME owned/used: none  Current Level of Function: working overhead for long time    Pain:  Current 0/10, worst 4/10, best 0/10   Location: left shoulder(s)  Description: Aching  Aggravating Factors: lifting overhead,  lifting over head moderate to heavy  Easing Factors:  rest and cream     Patients goals: be able to return to exercise without pain     Medical History:   Past Medical History:   Diagnosis Date    Chest pain 10/2019    Cigarette smoker     GERD (gastroesophageal reflux disease)     Hyperlipidemia     used to take medication    Hypertension        Surgical History:   Braeden Rowland  has a past surgical history that includes No past surgeries (03/06/2020).    Medications:   Braeden WEST has a current medication list which includes the following prescription(s): acetaminophen, amlodipine, aspirin, atorvastatin, diclofenac sodium, famotidine, [DISCONTINUED] meclizine, [DISCONTINUED] pantoprazole, and [DISCONTINUED] ranitidine.    Allergies:   Review of patient's allergies indicates:  No Known Allergies       OBJECTIVE       Posture: Rounded shoulders, forward head  Dermatomes: NT  Myotomes: NT  DTRs: NT    Palpation: tenderness to left serratus anterior    Cervical range of motion: WFL without pain      Shoulder Range of Motion:   ACTIVE ROM LEFT RIGHT   Flexion 155 WFL   Abduction WFL WFL   Horizontal Abd WFL WFL   Extension WFL WFL   IR L3 L3   ER C7 C7     STRENGTH LEFT RIGHT   Flexion 5/5 5/5   Abduction 5/5 5/5   Extension nt/5 nt/5   IR (neutral) 5/5 5/5   ER (neutral) 4/5 4/5   Middle Trap 4/5 4/5   Lower Trap 4/5 4/5     Joint Mobility: inferior joint mobilization tightness      Special Tests:      Impingement Right Left   Empty Can Test    + for decrease in strength   Hawkin's Canelo   -    Neer's Test    -      SLAP lesion/ biceps tendonitis Right Left   O' Tucker's Test    + weakness   Speed's Test    + weakness      Rotator Cuff Lesion Right Left   Drop Arm test    -   Subscapularis Lift Off    -   Belly Press Test    -   External rotation Lag Sign    -      AC Joint Right Left   AC Joint Compression Test    -     Pec minor tightness in supine      Limitation/Restriction for FOTO Shoulder Survey    Therapist  "reviewed FOTO scores for Braeden Rowland on 12/19/2022.   FOTO documents entered into [facility] - see Media section.    Limitation Score: 96%         TREATMENT     Total Treatment time (time-based codes) separate from Evaluation: 10 minutes      Braeden received the treatments listed below:      therapeutic exercises to develop strength, ROM, flexibility, and posture for 10 minutes including:    Supine pec stretch over towel roll 2'   Side lying external rotation with scap retraction 2x10   Seated thoracic extension over towel roll 10"x10   Seated scap retractions 5" x20      PATIENT EDUCATION AND HOME EXERCISES     Education provided:   - Home exercise program  - Plan of Care     Written Home Exercises Provided: yes. Exercises were reviewed and Braeden was able to demonstrate them prior to the end of the session.  Braeden demonstrated good  understanding of the education provided. See EMR under Patient Instructions for exercises provided during therapy sessions.    ASSESSMENT     Braeden WEST is a 57 y.o. male referred to outpatient Physical Therapy with a medical diagnosis of Arthritis of left shoulder region. Patient presents with weakness of Left lower trap and middle trap with tenderness to Left serratus anterior. He displayed tightness in Left GHJ with inferior joint mobs and with Left lat stretch. Pt reported high percentage of limitation with FOTO that did not match his ability with PT evaluation. He was able to complete all range of motion and strength testing with no provocation of pain. However, he does display decrease in left upper extremity range of motion as compared to Right as well as decrease in strength of middle and lower traps. He currently has most limitation when working overhead for prolonged periods of time. Patient localizes majority of pain/tenderness to left serratus anterior. He was easily fatigued with all postural strengthening given for Home exercise program.     Patient prognosis is Good. "   Patient will benefit from skilled outpatient Physical Therapy to address the deficits stated above and in the chart below, provide patient /family education, and to maximize patientt's level of independence.     Plan of care discussed with patient: Yes  Patient's spiritual, cultural and educational needs considered and patient is agreeable to the plan of care and goals as stated below:     Anticipated Barriers for therapy: compliance, chronicity of condition, scheduling conflicts    Medical Necessity is demonstrated by the following  History  Co-morbidities and personal factors that may impact the plan of care Co-morbidities:   Chest pain    Cigarette smoker    GERD (gastroesophageal reflux disease)    Hyperlipidemia    used to take medication   Hypertension      Personal Factors:   age  coping style  social background  lifestyle     moderate   Examination  Body Structures and Functions, activity limitations and participation restrictions that may impact the plan of care Body Regions:   neck  back  upper extremities    Body Systems:    gross symmetry  ROM  strength  transitions  motor control  motor learning      Participation Restrictions:   Reaching, lifting over head     Activity limitations:   Learning and applying knowledge  no deficits    General Tasks and Commands  no deficits    Communication  no deficits    Mobility  lifting and carrying objects    Self care  no deficits    Domestic Life  doing house work (cleaning house, washing dishes, laundry)  assisting others    Interactions/Relationships  no deficits    Life Areas  employment    Community and Social Life  community life  recreation and leisure         moderate   Clinical Presentation stable and uncomplicated low   Decision Making/ Complexity Score: low     Goals:  Short Term GOALS: 6 weeks. Pt agrees with goals set.  1. Patient demonstrates independence with HEP.   2. Patient demonstrates independence with Postural Awareness.   3. Patient  demonstrates independence with body mechanics.   4. Patient will report pain of </=1/10 at worst, on 0-10 pain scale, with all activity  5. Pt will increase Left GHJ flexion ROM by  10 degrees to improve functional reach pain free.   6. Pt will increase UE strength by 1/3 muscle grade or greater on MMT to improve tolerance to all functional activities pain free.     Long Term Goals 12 Weeks. Pt agrees with goals set:  1. Pt demonstrates ability to work over head for 2 minutes without increase in pain to improve tolerance to work related tasks.   2. Pt will increase UE strength to 5/5 to improve tolerance to all functional activities pain free.   3. Pt demonstrates improved function per FOTO Shoulder Survey to 44% Limitation or less.   4. Patient demonstrates ability to return to gym routine without increase in pain to return to PLOF.       PLAN   Plan of care Certification: 12/19/2022 to 3/20/23.    Outpatient Physical Therapy 1-2 times weekly for 12 weeks to include the following interventions: Manual Therapy, Moist Heat/ Ice, Neuromuscular Re-ed, Patient Education, Therapeutic Activities, Therapeutic Exercise, and Dry needling PRN .     Jena Ly, PT,DPT  12/20/2022        I CERTIFY THE NEED FOR THESE SERVICES FURNISHED UNDER THIS PLAN OF TREATMENT AND WHILE UNDER MY CARE   Physician's comments:     Physician's Signature: ___________________________________________________

## 2023-01-02 ENCOUNTER — HOSPITAL ENCOUNTER (EMERGENCY)
Facility: HOSPITAL | Age: 58
Discharge: HOME | End: 2023-01-02
Attending: EMERGENCY MEDICINE
Payer: MEDICAID

## 2023-01-02 VITALS
BODY MASS INDEX: 30.1 KG/M2 | RESPIRATION RATE: 18 BRPM | TEMPERATURE: 99 F | WEIGHT: 215 LBS | HEIGHT: 71 IN | OXYGEN SATURATION: 99 % | HEART RATE: 75 BPM | SYSTOLIC BLOOD PRESSURE: 143 MMHG | DIASTOLIC BLOOD PRESSURE: 89 MMHG

## 2023-01-02 DIAGNOSIS — K08.89 TOOTHACHE: ICD-10-CM

## 2023-01-02 DIAGNOSIS — M25.519 SHOULDER PAIN: Primary | ICD-10-CM

## 2023-01-02 DIAGNOSIS — R52 PAIN: ICD-10-CM

## 2023-01-02 PROCEDURE — 93010 ELECTROCARDIOGRAM REPORT: CPT | Mod: ,,, | Performed by: INTERNAL MEDICINE

## 2023-01-02 PROCEDURE — 93005 ELECTROCARDIOGRAM TRACING: CPT | Mod: ER

## 2023-01-02 PROCEDURE — 93010 EKG 12-LEAD: ICD-10-PCS | Mod: ,,, | Performed by: INTERNAL MEDICINE

## 2023-01-02 PROCEDURE — 99284 EMERGENCY DEPT VISIT MOD MDM: CPT | Mod: ER

## 2023-01-02 RX ORDER — AMOXICILLIN AND CLAVULANATE POTASSIUM 875; 125 MG/1; MG/1
1 TABLET, FILM COATED ORAL 2 TIMES DAILY
Qty: 20 TABLET | Refills: 0 | Status: SHIPPED | OUTPATIENT
Start: 2023-01-02 | End: 2023-01-12

## 2023-01-02 RX ORDER — IBUPROFEN 600 MG/1
600 TABLET ORAL EVERY 6 HOURS PRN
Qty: 20 TABLET | Refills: 0 | Status: SHIPPED | OUTPATIENT
Start: 2023-01-02 | End: 2023-05-24

## 2023-01-02 RX ORDER — CYCLOBENZAPRINE HCL 10 MG
10 TABLET ORAL 3 TIMES DAILY PRN
Qty: 15 TABLET | Refills: 0 | Status: SHIPPED | OUTPATIENT
Start: 2023-01-02 | End: 2023-01-07

## 2023-01-02 NOTE — Clinical Note
"Braeden WEST"Radha Rowland was seen and treated in our emergency department on 1/2/2023.  He may return to work on 01/04/2023.       If you have any questions or concerns, please don't hesitate to call.      Bharathi Michel MD"

## 2023-01-02 NOTE — ED PROVIDER NOTES
"Encounter Date: 1/2/2023       History     Chief Complaint   Patient presents with    Shoulder Pain     Left shoulder pain x 2 days, staets hx of same and has been moving objects, reports hard to sleep with pain    Dental Pain     Right lower jaw pain, toothache, states "hole in tooth"     57-year-old male presenting secondary to left shoulder pain that has been ongoing for the past 2 days.  Was lifting moving stuff about 3 days ago.  Hurts whenever he moves it.  Has not take anything to try to help out with the pain but rubbed some "cream" into it.  No fevers chills.  Also having some right lower posterior tooth pain.  Going to see the dentist tomorrow.  Has had dental issues before in the past.  No fevers chills.  No weakness or numbness or tingling.  No chest pain or shortness of breath abdominal pain.  No difficulty with swallowing.  No swelling of his mouth.    Review of patient's allergies indicates:  No Known Allergies  Past Medical History:   Diagnosis Date    Chest pain 10/2019    Cigarette smoker     GERD (gastroesophageal reflux disease)     Hyperlipidemia     used to take medication    Hypertension      Past Surgical History:   Procedure Laterality Date    NO PAST SURGERIES  03/06/2020     No family history on file.  Social History     Tobacco Use    Smoking status: Former     Packs/day: 0.50     Types: Cigarettes    Smokeless tobacco: Never   Substance Use Topics    Alcohol use: Yes     Alcohol/week: 21.0 standard drinks     Types: 21 Cans of beer per week     Comment: 3/6/20: "about 3, everyday after I get off work"    Drug use: No     Review of Systems   Constitutional:  Negative for fever.   HENT:  Positive for dental problem. Negative for sore throat.    Respiratory:  Negative for shortness of breath.    Cardiovascular:  Negative for chest pain.   Gastrointestinal:  Negative for nausea.   Genitourinary:  Negative for dysuria.   Musculoskeletal:  Positive for arthralgias and myalgias. Negative for " back pain.   Skin:  Negative for rash.   Neurological:  Negative for weakness.   Hematological:  Does not bruise/bleed easily.   Psychiatric/Behavioral:  Negative for agitation.    All other systems reviewed and are negative.    Physical Exam     Initial Vitals [01/02/23 0720]   BP Pulse Resp Temp SpO2   (!) 145/92 72 18 98.5 °F (36.9 °C) 98 %      MAP       --         Physical Exam    Nursing note and vitals reviewed.  Constitutional: He appears well-developed and well-nourished.   HENT:   Head: Normocephalic and atraumatic.   Poor dentition throughout.  Slight redness to the posterior right molar.  No fluctuance.  No signs RPA, PTA, Isaac's   Eyes: EOM are normal. Pupils are equal, round, and reactive to light.   Neck:   Normal range of motion.  Cardiovascular:  Normal rate and regular rhythm.           Pulmonary/Chest: Breath sounds normal. No stridor. No respiratory distress. He has no wheezes.   Abdominal: Abdomen is soft. Bowel sounds are normal. He exhibits no distension. There is no abdominal tenderness.   Musculoskeletal:         General: No edema. Normal range of motion.      Cervical back: Normal range of motion.      Comments: Minimal tenderness to left deltoid.     Neurological: He is alert and oriented to person, place, and time. He has normal strength. GCS score is 15. GCS eye subscore is 4. GCS verbal subscore is 5. GCS motor subscore is 6.   Skin: Skin is warm and dry. Capillary refill takes less than 2 seconds.   Psychiatric: He has a normal mood and affect. Thought content normal.       ED Course   Procedures  Labs Reviewed - No data to display  EKG Readings: (Independently Interpreted)   Ekg done at 7:52 a.m. showing normal sinus rhythm rate of 79.  No ST elevation or major T-wave abnormality.  Normal axis.  Old septal infarct.  QTC is 424.  Nonspecific EKG.     Imaging Results              X-Ray Shoulder Trauma Left (Final result)  Result time 01/02/23 08:17:52      Final result by Jerome AMES  MD Eugenia (01/02/23 08:17:52)                   Impression:      No convincing evidence of acute fracture or dislocation.      Electronically signed by: Jerome Bello  Date:    01/02/2023  Time:    08:17               Narrative:    EXAMINATION:  XR SHOULDER TRAUMA 3 VIEW LEFT    CLINICAL HISTORY:  Pain, unspecified    TECHNIQUE:  Three views of the left shoulder were performed.    COMPARISON  Left shoulder radiograph 09/15/2022.    FINDINGS:  No definite evidence of acute fracture or dislocation.  Joint spaces appear maintained.  AC joint DJD.  No definite radiopaque foreign body.    No acute findings identified in the visualized portions of the chest.                                       Medications - No data to display  Medical Decision Making:   Initial Assessment:   57-year-old male presenting secondary to they can shoulder pain.  I think this is more musculoskeletal.  Afebrile.  Good distal pulses.  No rashes lesions.  Doubt cellulitis.  EKG nonspecific.  Do not think cardiac cause.  Ibuprofen and muscle relaxers.  Starting patient on Augmentin.  Patient is seeing dentist.  Tomorrow.  Afebrile.  No signs RPA Isaac's.  Or PTA. I discussed with the patient/family the diagnosis, treatment plan, indications for return to the emergency department, and for expected follow-up. The patient/family verbalized an understanding. The patient/family is asked if there are any questions or concerns. We discuss the case, until all issues are addressed to the patient/family's satisfaction. Patient/family understands and is agreeable to the plan.   Bharathi Michel        Clinical Tests:   Radiological Study: Reviewed and Ordered  Medical Tests: Ordered and Reviewed                        Clinical Impression:   Final diagnoses:  [R52] Pain  [M25.519] Shoulder pain (Primary)  [K08.89] Toothache        ED Disposition Condition    Discharge Stable          ED Prescriptions       Medication Sig Dispense Start Date End Date Auth.  Provider    ibuprofen (ADVIL,MOTRIN) 600 MG tablet Take 1 tablet (600 mg total) by mouth every 6 (six) hours as needed for Pain. 20 tablet 1/2/2023 -- Bharathi Michel MD    cyclobenzaprine (FLEXERIL) 10 MG tablet Take 1 tablet (10 mg total) by mouth 3 (three) times daily as needed. 15 tablet 1/2/2023 1/7/2023 Bharathi Michel MD    amoxicillin-clavulanate 875-125mg (AUGMENTIN) 875-125 mg per tablet Take 1 tablet by mouth 2 (two) times daily. for 10 days 20 tablet 1/2/2023 1/12/2023 Bharathi Michel MD          Follow-up Information       Follow up With Specialties Details Why Contact Info    [facility] Schedule an appointment as soon as possible for a visit in 2 days  [facility] [address]  [phone]               Bharathi Michel MD  01/02/23 0848

## 2023-01-02 NOTE — DISCHARGE INSTRUCTIONS
Thank you for coming to our Emergency Department today. It is important to remember that some problems are difficult to diagnose and may not be found during your first visit. Be sure to follow up with your primary care doctor and review any labs/imaging that was performed with them. If you do not have a primary care doctor, you may contact the one listed on your discharge paperwork or you may also call the [facility] Appointment Desk at [phone] to schedule an appointment with one.     All medications may potentially have side effects and it is impossible to predict which medications may give you side effects. If you feel that you are having a negative effect of any medication you should immediately stop taking them and seek medical attention.    Return to the ER with any questions/concerns, new/concerning symptoms, worsening or failure to improve. Do not drive or make any important decisions for 24 hours if you have received any pain medications, sedatives or mood altering drugs during your ER visit.    Please see your dentist tomorrow as we have already discussed

## 2023-01-09 ENCOUNTER — CLINICAL SUPPORT (OUTPATIENT)
Dept: REHABILITATION | Facility: HOSPITAL | Age: 58
End: 2023-01-09
Payer: MEDICAID

## 2023-01-09 DIAGNOSIS — R29.898 DECREASED STRENGTH OF UPPER EXTREMITY: Primary | ICD-10-CM

## 2023-01-09 PROCEDURE — 97110 THERAPEUTIC EXERCISES: CPT | Mod: PN

## 2023-01-09 NOTE — PROGRESS NOTES
"[facility]   Physical Therapy Treatment Note     Name: Braeden Rowland  Clinic Number: [id number]    Therapy Diagnosis:   Encounter Diagnosis   Name Primary?    Decreased strength of upper extremity Yes     Physician: Nubia Roem FNP-C    Visit Date: 1/9/2023    Physician Orders: PT Eval and Treat   Medical Diagnosis from Referral: M19.012 (ICD-10-CM) - Arthritis of left shoulder region  Evaluation Date: 12/19/2022  Authorization Period Expiration: 12/31/2022  Plan of Care Expiration: 3/19/23  Progress Note Due: 1/19/23  Visit # / Visits authorized: 1/ 1   FOTO: 1/5     Precautions: Standard and HTN         PTA Visit #: 0/5     Time In: 1700  Time Out: 1757  Total Billable Time: 57 minutes    SUBJECTIVE     Pt reports: His shoulder has been feeling good. He has been working on his exercises at home and he feels like they are really helping. He had a tooth pulled this morning. .  He was compliant with home exercise program.  Response to previous treatment: 1st treatment  Functional change: good    Pain: 0/10  Location:  left shoulder(s)    OBJECTIVE     Objective Measures updated at progress report unless specified.     Treatment     Braeden received the treatments listed below:      therapeutic exercises to develop strength, endurance, and posture for 57 minutes including:    +UBE 3/3 lvl 3  Supine pec stretch over towel roll 2'   +supine wand flexion over towel roll for thoracic extension 5" hold 3x10   Side lying external rotation with scap retraction 2x10   Seated scap retractions 5" x30  +prone Y's 2x10  +prone T's 2x10  +prone rows 2x10 ea  +bilateral external rotation yellow theraband with elevation 2x10 (heavy cueing)  +external rotation Red theraband 2x10 ea  +push up plus on mat 2x10   +supine serratus pus 5# wand 3x10   + carry 10# kettlebell 2 laps on turf  manual therapy techniques: Joint mobilizations were applied to the: Left shoulder for 00 minutes, " including:      Patient Education and Home Exercises     Home Exercises Provided and Patient Education Provided     Education provided:   - Continue Home exercise program     Written Home Exercises Provided: Patient instructed to cont prior HEP. Exercises were reviewed and Braeden was able to demonstrate them prior to the end of the session.  Braeden demonstrated good  understanding of the education provided. See EMR under Patient Instructions for exercises provided during therapy sessions    ASSESSMENT     Braeden was seen for first follow up since initial evaluation. He continues to display rounded shoulders and decrease in postural strength. He required moderate amount of cueing to properly demonstrate scap retractions with OH elevation. He also required multiple cues for proper scapular mobility with push up plus and prone exercises.     Braeden Is progressing well towards his goals.   Pt prognosis is Good.     Pt will continue to benefit from skilled outpatient physical therapy to address the deficits listed in the problem list box on initial evaluation, provide pt/family education and to maximize pt's level of independence in the home and community environment.     Pt's spiritual, cultural and educational needs considered and pt agreeable to plan of care and goals.     Anticipated barriers to physical therapy:  compliance, chronicity of condition, scheduling conflicts    Short Term GOALS: 6 weeks. Pt agrees with goals set.  1. Patient demonstrates independence with HEP.  Progressing, not met  2. Patient demonstrates independence with Postural Awareness. Progressing, not met  3. Patient demonstrates independence with body mechanics. Progressing, not met  4. Patient will report pain of </=1/10 at worst, on 0-10 pain scale, with all activity Progressing, not met  5. Pt will increase Left GHJ flexion ROM by  10 degrees to improve functional reach pain free. Progressing, not met  6. Pt will increase UE strength by 1/3  muscle grade or greater on MMT to improve tolerance to all functional activities pain free.  Progressing, not met     Long Term Goals 12 Weeks. Pt agrees with goals set:  1. Pt demonstrates ability to work over head for 2 minutes without increase in pain to improve tolerance to work related tasks. Progressing, not met  2. Pt will increase UE strength to 5/5 to improve tolerance to all functional activities pain free.  Progressing, not met  3. Pt demonstrates improved function per FOTO Shoulder Survey to 44% Limitation or less. Progressing, not met  4. Patient demonstrates ability to return to gym routine without increase in pain to return to PLOF. Progressing, not met    PLAN     Improve thoracic mobility and postural strength    Jena Ly, PT,DPT  01/09/2023

## 2023-02-19 ENCOUNTER — OFFICE VISIT (OUTPATIENT)
Dept: URGENT CARE | Facility: CLINIC | Age: 58
End: 2023-02-19
Payer: MEDICAID

## 2023-02-19 VITALS
SYSTOLIC BLOOD PRESSURE: 138 MMHG | DIASTOLIC BLOOD PRESSURE: 83 MMHG | WEIGHT: 215 LBS | HEART RATE: 72 BPM | BODY MASS INDEX: 30.1 KG/M2 | OXYGEN SATURATION: 96 % | TEMPERATURE: 98 F | HEIGHT: 71 IN | RESPIRATION RATE: 18 BRPM

## 2023-02-19 DIAGNOSIS — S29.011A MUSCLE STRAIN OF ANTERIOR CHEST WALL: Primary | ICD-10-CM

## 2023-02-19 DIAGNOSIS — G89.29 CHRONIC LEFT SHOULDER PAIN: ICD-10-CM

## 2023-02-19 DIAGNOSIS — R07.89 OTHER CHEST PAIN: ICD-10-CM

## 2023-02-19 DIAGNOSIS — M25.512 CHRONIC LEFT SHOULDER PAIN: ICD-10-CM

## 2023-02-19 PROBLEM — F43.20 ADJUSTMENT DISORDER: Status: ACTIVE | Noted: 2022-07-19

## 2023-02-19 PROBLEM — E78.5 DYSLIPIDEMIA: Status: ACTIVE | Noted: 2020-08-27

## 2023-02-19 PROBLEM — M19.012 OSTEOARTHRITIS OF LEFT SHOULDER: Status: ACTIVE | Noted: 2020-09-16

## 2023-02-19 PROCEDURE — 1159F PR MEDICATION LIST DOCUMENTED IN MEDICAL RECORD: ICD-10-PCS | Mod: CPTII,S$GLB,, | Performed by: PHYSICIAN ASSISTANT

## 2023-02-19 PROCEDURE — 93010 EKG 12-LEAD: ICD-10-PCS | Mod: S$PBB,,, | Performed by: INTERNAL MEDICINE

## 2023-02-19 PROCEDURE — 93005 ELECTROCARDIOGRAM TRACING: CPT | Mod: S$GLB,,, | Performed by: PHYSICIAN ASSISTANT

## 2023-02-19 PROCEDURE — 93005 EKG 12-LEAD: ICD-10-PCS | Mod: S$GLB,,, | Performed by: PHYSICIAN ASSISTANT

## 2023-02-19 PROCEDURE — 3075F SYST BP GE 130 - 139MM HG: CPT | Mod: CPTII,S$GLB,, | Performed by: PHYSICIAN ASSISTANT

## 2023-02-19 PROCEDURE — 93010 ELECTROCARDIOGRAM REPORT: CPT | Mod: S$PBB,,, | Performed by: INTERNAL MEDICINE

## 2023-02-19 PROCEDURE — 99213 PR OFFICE/OUTPT VISIT, EST, LEVL III, 20-29 MIN: ICD-10-PCS | Mod: S$GLB,,, | Performed by: PHYSICIAN ASSISTANT

## 2023-02-19 PROCEDURE — 3008F BODY MASS INDEX DOCD: CPT | Mod: CPTII,S$GLB,, | Performed by: PHYSICIAN ASSISTANT

## 2023-02-19 PROCEDURE — 3075F PR MOST RECENT SYSTOLIC BLOOD PRESS GE 130-139MM HG: ICD-10-PCS | Mod: CPTII,S$GLB,, | Performed by: PHYSICIAN ASSISTANT

## 2023-02-19 PROCEDURE — 1160F PR REVIEW ALL MEDS BY PRESCRIBER/CLIN PHARMACIST DOCUMENTED: ICD-10-PCS | Mod: CPTII,S$GLB,, | Performed by: PHYSICIAN ASSISTANT

## 2023-02-19 PROCEDURE — 1160F RVW MEDS BY RX/DR IN RCRD: CPT | Mod: CPTII,S$GLB,, | Performed by: PHYSICIAN ASSISTANT

## 2023-02-19 PROCEDURE — 1159F MED LIST DOCD IN RCRD: CPT | Mod: CPTII,S$GLB,, | Performed by: PHYSICIAN ASSISTANT

## 2023-02-19 PROCEDURE — 3079F PR MOST RECENT DIASTOLIC BLOOD PRESSURE 80-89 MM HG: ICD-10-PCS | Mod: CPTII,S$GLB,, | Performed by: PHYSICIAN ASSISTANT

## 2023-02-19 PROCEDURE — 3008F PR BODY MASS INDEX (BMI) DOCUMENTED: ICD-10-PCS | Mod: CPTII,S$GLB,, | Performed by: PHYSICIAN ASSISTANT

## 2023-02-19 PROCEDURE — 3079F DIAST BP 80-89 MM HG: CPT | Mod: CPTII,S$GLB,, | Performed by: PHYSICIAN ASSISTANT

## 2023-02-19 PROCEDURE — 99213 OFFICE O/P EST LOW 20 MIN: CPT | Mod: S$GLB,,, | Performed by: PHYSICIAN ASSISTANT

## 2023-02-19 NOTE — PATIENT INSTRUCTIONS
A referral was placed for orthopedics for you someone should contact you however if you do not hear from them in a week please call [phone] to schedule appointment.  Tylenol/ibuprofen as needed for pain. Follow up as needed

## 2023-02-19 NOTE — PROGRESS NOTES
"Subjective:       Patient ID: Braeden Rowland is a 57 y.o. male.    Vitals:  height is 5' 11" (1.803 m) and weight is 97.5 kg (215 lb). His tympanic temperature is 97.6 °F (36.4 °C). His blood pressure is 138/83 and his pulse is 72. His respiration is 18 and oxygen saturation is 96%.     Chief Complaint: Shoulder Pain    Pt is coming in with left shoulder pain that started this morning. Patient states that he was lifting a pot out of the oven when he felt the pain in his left chest.Pt says he went to  a pot and felt a sharp pain in his shoulder. Pt says he has arthritis. Pt says he took Asprin to help with mild relief. Patient has been dealing with left shoulder pain for years.  Reviewing his chart he has been to the ER multiple times as well as with his primary care.  He has been doing physical therapy which has been making it worse per the patient.  Pain is intermittent located at the lateral left clavicle.  Recent x-ray showed DJD. Pt is currently not in mild pain after he took his aspirin.     Shoulder Pain   The pain is present in the left shoulder. This is a new problem. The current episode started today. There has been no history of extremity trauma. The problem occurs constantly. The problem has been unchanged. The quality of the pain is described as aching. The pain is at a severity of 3/10. The pain is mild. Pertinent negatives include no fever, headaches, limited range of motion, numbness, stiffness or tingling. He has tried NSAIDS for the symptoms. The treatment provided mild relief.     Constitution: Negative for appetite change, chills, sweating, fatigue, fever and unexpected weight change.   Neck: Negative for neck pain, neck stiffness, neck swelling, degenerative disc disease and bulging disc disease.   Cardiovascular:  Positive for chest pain. Negative for chest trauma, leg swelling, palpitations, sob on exertion and passing out.   Respiratory:  Negative for chest tightness, cough, sputum " production, shortness of breath and wheezing.    Gastrointestinal:  Negative for abdominal pain.   Musculoskeletal:  Positive for pain. Negative for trauma, joint pain, joint swelling, abnormal ROM of joint and back pain.   Skin:  Negative for color change, pale, rash and erythema.   Neurological:  Negative for dizziness, headaches, disorientation, altered mental status, numbness, tingling and tremors.   Psychiatric/Behavioral:  Negative for altered mental status, disorientation and confusion.      Objective:      Physical Exam   Constitutional: He is oriented to person, place, and time.  Non-toxic appearance. He does not appear ill. No distress. normal  HENT:   Head: Normocephalic.   Ears:   Right Ear: External ear normal.   Left Ear: External ear normal.   Eyes: Conjunctivae are normal.   Neck: Neck supple. No neck rigidity present.   Cardiovascular: Normal heart sounds and normal pulses.   No murmur heard.Exam reveals no gallop.   Pulmonary/Chest: Effort normal and breath sounds normal. No stridor. No respiratory distress. He has no wheezes. He has no rhonchi. He has no rales.   Abdominal: Normal appearance.   Musculoskeletal: Normal range of motion.         General: Tenderness present. No swelling or deformity. Normal range of motion.      Right shoulder: Normal.      Left shoulder: He exhibits tenderness. He exhibits normal range of motion, no bony tenderness, no swelling, no effusion, no crepitus, no deformity, no laceration, normal pulse and normal strength.      Right elbow: Normal.     Left elbow: Normal.      Right wrist: Normal.      Left wrist: Normal.      Right hip: Normal.      Left hip: Normal.      Right knee: Normal.      Left knee: Normal.      Right ankle: Normal.      Left ankle: Normal.      Cervical back: He exhibits no tenderness.      Thoracic back: Normal.      Lumbar back: Normal.      Right upper arm: Normal.      Left upper arm: Normal.      Right forearm: Normal.      Left forearm:  Normal.        Arms:       Right hand: Normal.      Left hand: Normal.      Right upper leg: Normal.      Left upper leg: Normal.      Right lower leg: Normal. No edema.      Left lower leg: Normal. No edema.      Right foot: Normal.      Left foot: Normal.   Lymphadenopathy:     He has no cervical adenopathy.   Neurological: He is alert and oriented to person, place, and time. He displays no weakness. No sensory deficit. Coordination normal.   Skin: Skin is warm, dry, not diaphoretic, not pale and no rash. Capillary refill takes less than 2 seconds. No bruising and No erythema   Psychiatric: His behavior is normal. Mood, judgment and thought content normal.   Nursing note and vitals reviewed.      Assessment:       1. Muscle strain of anterior chest wall    2. Other chest pain    3. Chronic left shoulder pain          Plan:         Muscle strain of anterior chest wall    Other chest pain  -     IN OFFICE EKG 12-LEAD (to Muse)    Chronic left shoulder pain  -     Ambulatory referral/consult to Orthopedics       I have reviewed the patient chart and pertinent past imaging/labs.  Patient Instructions   A referral was placed for orthopedics for you someone should contact you however if you do not hear from them in a week please call [phone] to schedule appointment.  Tylenol/ibuprofen as needed for pain. Follow up as needed

## 2023-04-03 NOTE — PATIENT INSTRUCTIONS
USE CREAM TWICE A DAY- KEEP AREA DRY    TAKE ZANTAC AT NIGHT FOR REFLEX    USE NASAL SPRAY MORNING AND NIGHT    FOLLOW UP WITH PCP    GERD (Adult)    The esophagus is a tube that carries food from the mouth to the stomach. A valve at the lower end of the esophagus prevents stomach acid from flowing upward. When this valve doesn't work properly, stomach contents may repeatedly flow back up (reflux) into the esophagus. This is called gastroesophageal reflux disease (GERD). GERD can irritate the esophagus. It can cause problems with swallowing or breathing. In severe cases, GERD can cause recurrent pneumonia or other serious problems.  Symptoms of reflux include burning, pressure or sharp pain in the upper abdomen or mid to lower chest. The pain can spread to the neck, back, or shoulder. There may be belching, an acid taste in the back of the throat, chronic cough, or sore throat or hoarseness. GERD symptoms often occur during the day after a big meal. They can also occur at night when lying down.   Home care  Lifestyle changes can help reduce symptoms. If needed, medicines may be prescribed. Symptoms often improve with treatment, but if treatment is stopped, the symptoms often return after a few months. So most persons with GERD will need to continue treatment.  Lifestyle changes  · Limit or avoid fatty, fried, and spicy foods, as well as coffee, chocolate, mint, and foods with high acid content such as tomatoes and citrus fruit and juices (orange, grapefruit, lemon).  · Dont eat large meals, especially at night. Frequent, smaller meals are best. Do not lie down right after eating. And dont eat anything 3 hours before going to bed.  · Avoid drinking alcohol and smoking. As much as possible, stay away from second hand smoke.  · If you are overweight, losing weight will reduce symptoms.   · Avoid wearing tight clothing around your stomach area.  · If your symptoms occur during sleep, use a foam wedge to elevate your  "upper body (not just your head.) Or, place 4" blocks under the head of your bed.  Medicines  If needed, medicines can help relieve the symptoms of GERD and prevent damage to the esophagus. Discuss a medicine plan with your healthcare provider. This may include one or more of the following medicines:  · Antacids to help neutralize the normal acids in your stomach.  · Acid blockers (H2 blockers) to decrease acid production.  · Acid inhibitors (PPIs) to decrease acid production in a different way than the blockers. They may work better, but can take a little longer to take effect.  Take an antacid 30-60 minutes after eating and at bedtime, but not at the same time as an acid blocker.  Try not to take medicines such as ibuprofen and aspirin. If you are taking aspirin for your heart or other medical reasons, talk to your healthcare provider about stopping it.  Follow-up care  Follow up with your healthcare provider or as advised by our staff.  When to seek medical advice  Call your healthcare provider if any of the following occur:  · Stomach pain gets worse or moves to the lower right abdomen (appendix area)  · Chest pain appears or gets worse, or spreads to the back, neck, shoulder, or arm  · Frequent vomiting (cant keep down liquids)  · Blood in the stool or vomit (red or black in color)  · Feeling weak or dizzy  · Fever of 100.4ºF (38ºC) or higher, or as directed by your healthcare provider  Date Last Reviewed: 6/23/2015  © 3604-9017 [employer]. [address] [place] [address], [place] [address]. All rights reserved. This information is not intended as a substitute for professional medical care. Always follow your healthcare professional's instructions.        Tips to Control Acid Reflux    To control acid reflux, youll need to make some basic diet and lifestyle changes. The simple steps outlined below may be all youll need to ease discomfort.  Watch what you eat  · Avoid fatty foods and spicy foods.  · Eat " fewer acidic foods, such as citrus and tomato-based foods. These can increase symptoms.  · Limit drinking alcohol, caffeine, and fizzy beverages. All increase acid reflux.  · Try limiting chocolate, peppermint, and spearmint. These can worsen acid reflux in some people.  Watch when you eat  · Avoid lying down for 3 hours after eating.  · Do not snack before going to bed.  Raise your head  Raising your head and upper body by 4 to 6 inches helps limit reflux when youre lying down. Put blocks under the head of your bed frame to raise it.  Other changes  · Lose weight, if you need to  · Dont exercise near bedtime  · Avoid tight-fitting clothes  · Limit aspirin and ibuprofen  · Stop smoking   Date Last Reviewed: 7/1/2016  © 7636-7027 [employer] [place]. [address] [place] [address], [place] [address]. All rights reserved. This information is not intended as a substitute for professional medical care. Always follow your healthcare professional's instructions.         ambulatory

## 2023-05-24 ENCOUNTER — HOSPITAL ENCOUNTER (EMERGENCY)
Facility: HOSPITAL | Age: 58
Discharge: HOME | End: 2023-05-24
Attending: EMERGENCY MEDICINE
Payer: MEDICAID

## 2023-05-24 VITALS
HEIGHT: 71 IN | DIASTOLIC BLOOD PRESSURE: 89 MMHG | BODY MASS INDEX: 30.8 KG/M2 | SYSTOLIC BLOOD PRESSURE: 143 MMHG | OXYGEN SATURATION: 100 % | RESPIRATION RATE: 20 BRPM | WEIGHT: 220 LBS | TEMPERATURE: 99 F | HEART RATE: 90 BPM

## 2023-05-24 DIAGNOSIS — R07.9 CHEST PAIN: ICD-10-CM

## 2023-05-24 DIAGNOSIS — M19.012 ARTHRITIS OF LEFT SHOULDER REGION: Primary | ICD-10-CM

## 2023-05-24 PROCEDURE — 99283 EMERGENCY DEPT VISIT LOW MDM: CPT | Mod: ER

## 2023-05-24 PROCEDURE — 93005 ELECTROCARDIOGRAM TRACING: CPT | Mod: ER

## 2023-05-24 PROCEDURE — 93010 EKG 12-LEAD: ICD-10-PCS | Mod: ,,, | Performed by: INTERNAL MEDICINE

## 2023-05-24 PROCEDURE — 93010 ELECTROCARDIOGRAM REPORT: CPT | Mod: ,,, | Performed by: INTERNAL MEDICINE

## 2023-05-24 NOTE — ED PROVIDER NOTES
"Encounter Date: 5/24/2023       History     Chief Complaint   Patient presents with    Chest Pain     Left anterior chest wall pain, reproducible w/ palpation. States similar to prior episodes of arthritis in left shoulder.      This patient has a history of left shoulder issues and problems with arthritis.  Presents to the emergency department with range of motion related pain in the left shoulder.  Patient wanted to make certain that it was his shoulder and not his heart.  Patient denies any cough cold fever flu-like symptoms nausea vomiting diarrhea.  Denies any shortness of breath diaphoresis and or any exertional symptomatology.  Patient states this feels exactly like his arthritis flare-up in his left shoulder that he is had before.  He just wants to make sure.    The history is provided by the patient.   Review of patient's allergies indicates:  No Known Allergies  Past Medical History:   Diagnosis Date    Chest pain 10/2019    Cigarette smoker     GERD (gastroesophageal reflux disease)     Hyperlipidemia     used to take medication    Hypertension      Past Surgical History:   Procedure Laterality Date    NO PAST SURGERIES  03/06/2020     No family history on file.  Social History     Tobacco Use    Smoking status: Former     Packs/day: 0.50     Types: Cigarettes    Smokeless tobacco: Never   Substance Use Topics    Alcohol use: Yes     Alcohol/week: 21.0 standard drinks     Types: 21 Cans of beer per week     Comment: 3/6/20: "about 3, everyday after I get off work"    Drug use: No     Review of Systems   Constitutional: Negative.    HENT: Negative.     Eyes: Negative.    Respiratory: Negative.     Cardiovascular: Negative.    Gastrointestinal: Negative.    Endocrine: Negative.    Genitourinary: Negative.    Musculoskeletal: Negative.    Skin: Negative.    Allergic/Immunologic: Negative.    Neurological: Negative.    Hematological: Negative.    Psychiatric/Behavioral: Negative.     All other systems " reviewed and are negative.    Physical Exam     Initial Vitals [05/24/23 0445]   BP Pulse Resp Temp SpO2   (!) 143/89 90 20 98.9 °F (37.2 °C) 100 %      MAP       --         Physical Exam    Nursing note and vitals reviewed.  Constitutional: Vital signs are normal. He appears well-developed. He is active and cooperative.   HENT:   Head: Normocephalic and atraumatic.   Eyes: Conjunctivae, EOM and lids are normal. Pupils are equal, round, and reactive to light.   Neck: Trachea normal. Neck supple. No thyroid mass present.    Full passive range of motion without pain.     Cardiovascular:  Normal rate, regular rhythm, S1 normal, S2 normal, normal heart sounds, intact distal pulses and normal pulses.           Pulmonary/Chest: Effort normal and breath sounds normal.   Abdominal: Abdomen is soft and flat. Bowel sounds are normal. There is no abdominal tenderness.   Musculoskeletal:         General: Normal range of motion.        Arms:       Cervical back: Full passive range of motion without pain and neck supple.     Lymphadenopathy:     He has no axillary adenopathy.   Neurological: He is alert and oriented to person, place, and time.   Skin: Skin is warm, dry and intact.   Psychiatric: He has a normal mood and affect. His speech is normal and behavior is normal. Judgment and thought content normal. Cognition and memory are normal.       ED Course   Procedures  Labs Reviewed - No data to display  EKG Readings: (Independently Interpreted)   Rhythm: Normal Sinus Rhythm. Heart Rate: 76. Ectopy: No Ectopy. Conduction: Normal. ST Segments: Normal ST Segments. T Waves: Normal. Clinical Impression: Normal Sinus Rhythm     Imaging Results    None          Medications - No data to display  Medical Decision Making:   ED Management:  This patient has a known arthropathy in his left shoulder his complaints physical findings are all consistent with his arthropathy.  Patient does have a history of hypertension cigarette smoking and  hypercholesterolemia.  Patient states blood pressure is under normal good control in addition to this his cholesterol is also well managed.  Patient encouraged to discontinue smoking.  I feel this patient does not require any further workup I have a completely normal EKG and reproducible pain that is consistent with the patient's prior history of left shoulder arthropathy.                        Clinical Impression:   Final diagnoses:  [R07.9] Chest pain  [M19.012] Arthritis of left shoulder region (Primary)        ED Disposition Condition    Discharge Stable          ED Prescriptions    None       Follow-up Information       Follow up With Specialties Details Why Contact Info    Orthopedics  Schedule an appointment as soon as possible for a visit in 1 week               Misael Davidson MD  05/24/23 5085

## 2023-09-24 ENCOUNTER — OFFICE VISIT (OUTPATIENT)
Dept: URGENT CARE | Facility: CLINIC | Age: 58
End: 2023-09-24
Payer: MEDICAID

## 2023-09-24 VITALS
RESPIRATION RATE: 18 BRPM | HEIGHT: 71 IN | TEMPERATURE: 98 F | BODY MASS INDEX: 30.8 KG/M2 | DIASTOLIC BLOOD PRESSURE: 87 MMHG | OXYGEN SATURATION: 96 % | SYSTOLIC BLOOD PRESSURE: 138 MMHG | WEIGHT: 220 LBS | HEART RATE: 75 BPM

## 2023-09-24 DIAGNOSIS — M19.012 ARTHRITIS OF LEFT SHOULDER REGION: Primary | ICD-10-CM

## 2023-09-24 PROCEDURE — 99212 OFFICE O/P EST SF 10 MIN: CPT | Mod: S$GLB,,,

## 2023-09-24 PROCEDURE — 99212 PR OFFICE/OUTPT VISIT, EST, LEVL II, 10-19 MIN: ICD-10-PCS | Mod: S$GLB,,,

## 2023-09-24 NOTE — PROGRESS NOTES
"Subjective:      Patient ID: Braeden Rowland is a 57 y.o. male.    Vitals:  height is 5' 11" (1.803 m) and weight is 99.8 kg (220 lb). His oral temperature is 97.8 °F (36.6 °C). His blood pressure is 138/87 and his pulse is 75. His respiration is 18 and oxygen saturation is 96%.     Chief Complaint: Shoulder Pain    Patient is a 57-year-old male with history of chronic left shoulder pain who is presenting with acute left shoulder pain started today.  Reports that he has arthritis left shoulder and it will flare up every now and then, and this feels like his typical flare-up.  Took ibuprofen 800 mg with complete resolution of pain.  No pain currently.  Left shoulder pain has been ongoing for years.  Reports he has trouble getting an appointment with Orthopedics.  Has previously seen Cardiology with negative cardiac workup.  Denies any chest pain, SOB, nausea, vomiting, palpitations, joint swelling, erythema, bruising, rash.    Shoulder Pain   The pain is present in the left shoulder. The current episode started more than 1 month ago. The problem occurs daily. The problem has been unchanged. The quality of the pain is described as aching. The pain is at a severity of 5/10. The pain is moderate. Pertinent negatives include no fever or limited range of motion. The symptoms are aggravated by activity. He has tried acetaminophen for the symptoms.       Constitution: Negative for fever.   Cardiovascular:  Negative for chest pain.   Respiratory:  Negative for shortness of breath.    Gastrointestinal:  Negative for nausea and vomiting.   Musculoskeletal:  Positive for joint pain. Negative for joint swelling, abnormal ROM of joint and muscle ache.   Skin:  Negative for rash, erythema and bruising.      Objective:     Physical Exam   Constitutional: He is oriented to person, place, and time. He appears well-developed.   HENT:   Head: Normocephalic and atraumatic.   Ears:   Right Ear: External ear normal.   Left Ear: External " ear normal.   Nose: Nose normal.   Mouth/Throat: Oropharynx is clear and moist.   Eyes: Conjunctivae, EOM and lids are normal. Pupils are equal, round, and reactive to light.   Neck: Trachea normal and phonation normal. Neck supple.   Cardiovascular: Normal rate, regular rhythm, normal heart sounds and normal pulses.   Pulmonary/Chest: Effort normal and breath sounds normal. He exhibits no tenderness.   Abdominal: He exhibits no distension. Soft. There is no abdominal tenderness.   Musculoskeletal: Normal range of motion.         General: No swelling or tenderness. Normal range of motion.      Comments: FROM of all 4 extremities   Neurological: He is alert and oriented to person, place, and time.   Skin: Skin is warm, dry and intact. No erythema   Psychiatric: His speech is normal and behavior is normal. Judgment and thought content normal.   Nursing note and vitals reviewed.      Assessment:     1. Arthritis of left shoulder region        Plan:       Arthritis of left shoulder region  -     Ambulatory referral/consult to Orthopedics                  Patient Instructions   - Ibuprofen for pain    - Follow up with your PCP or specialty clinic as directed in the next 1-2 weeks if not improved or as needed.  You can call [phone] to schedule an appointment with the appropriate provider.    - Go to the ER or seek medical attention immediately if you develop new or worsening symptoms.    - You must understand that you have received an Urgent Care treatment only and that you may be released before all of your medical problems are known or treated.   - You, the patient, will arrange for follow up care as instructed.   - If your condition worsens or fails to improve we recommend that you receive another evaluation at the ER immediately or contact your PCP to discuss your concerns or return here.

## 2023-09-24 NOTE — PATIENT INSTRUCTIONS
- Ibuprofen for pain    - Follow up with your PCP or specialty clinic as directed in the next 1-2 weeks if not improved or as needed.  You can call [phone] to schedule an appointment with the appropriate provider.    - Go to the ER or seek medical attention immediately if you develop new or worsening symptoms.    - You must understand that you have received an Urgent Care treatment only and that you may be released before all of your medical problems are known or treated.   - You, the patient, will arrange for follow up care as instructed.   - If your condition worsens or fails to improve we recommend that you receive another evaluation at the ER immediately or contact your PCP to discuss your concerns or return here.

## 2023-10-30 ENCOUNTER — OFFICE VISIT (OUTPATIENT)
Dept: URGENT CARE | Facility: CLINIC | Age: 58
End: 2023-10-30
Payer: MEDICAID

## 2023-10-30 VITALS
WEIGHT: 220 LBS | TEMPERATURE: 98 F | HEART RATE: 91 BPM | HEIGHT: 71 IN | RESPIRATION RATE: 18 BRPM | OXYGEN SATURATION: 96 % | BODY MASS INDEX: 30.8 KG/M2 | SYSTOLIC BLOOD PRESSURE: 142 MMHG | DIASTOLIC BLOOD PRESSURE: 82 MMHG

## 2023-10-30 DIAGNOSIS — Z20.822 EXPOSURE TO COVID-19 VIRUS: Primary | ICD-10-CM

## 2023-10-30 LAB
CTP QC/QA: YES
SARS-COV-2 AG RESP QL IA.RAPID: NEGATIVE

## 2023-10-30 PROCEDURE — 99214 OFFICE O/P EST MOD 30 MIN: CPT | Mod: S$GLB,,, | Performed by: EMERGENCY MEDICINE

## 2023-10-30 PROCEDURE — 87811 SARS-COV-2 COVID19 W/OPTIC: CPT | Mod: QW,S$GLB,, | Performed by: EMERGENCY MEDICINE

## 2023-10-30 PROCEDURE — 99214 PR OFFICE/OUTPT VISIT, EST, LEVL IV, 30-39 MIN: ICD-10-PCS | Mod: S$GLB,,, | Performed by: EMERGENCY MEDICINE

## 2023-10-30 PROCEDURE — 87811 SARS CORONAVIRUS 2 ANTIGEN POCT, MANUAL READ: ICD-10-PCS | Mod: QW,S$GLB,, | Performed by: EMERGENCY MEDICINE

## 2023-10-30 NOTE — PROGRESS NOTES
"Subjective:      Patient ID: Braeden Rowland is a 57 y.o. male.    Vitals:  height is 5' 11" (1.803 m) and weight is 99.8 kg (220 lb). His temperature is 98 °F (36.7 °C). His blood pressure is 142/82 (abnormal) and his pulse is 91. His respiration is 18 and oxygen saturation is 96%.     Chief Complaint: Cough    PATIENT IS A 57-YEAR-OLD MALE WHO STATES THAT HIS CO-WORKER WAS SICK IN THINKS THAT IT WAS COVID AND THAT HE HAD VERY MILD CONGESTION THAT RESOLVED HOWEVER WORK WANTED HIM TO MAKE SURE THAT HE DID NOT HAVE COVID BEFORE RETURNING.  COVID SWAB DONE AND NEGATIVE.  EXAM IS NORMAL AND NORMAL VITAL SIGNS.    Cough  This is a new problem. The current episode started today. The problem has been unchanged. The cough is Non-productive. Pertinent negatives include no chest pain, chills, ear congestion, ear pain, fever, headaches, heartburn, hemoptysis, myalgias, nasal congestion, postnasal drip, rash, rhinorrhea, sore throat, shortness of breath, sweats, weight loss or wheezing. Nothing aggravates the symptoms. He has tried nothing for the symptoms. There is no history of asthma, bronchiectasis, bronchitis, COPD, emphysema, environmental allergies or pneumonia.     ROS    Constitution: Negative for chills and fever.   HENT:  Negative for ear pain, congestion, postnasal drip, sinus pain and sore throat.    Neck: Negative for neck pain and neck stiffness.   Cardiovascular:  Negative for chest pain and palpitations.   Respiratory:  Negative for cough, bloody sputum, shortness of breath and wheezing.    Gastrointestinal:  Negative for heartburn.   Genitourinary:  Negative for dysuria and hematuria.   Musculoskeletal:  Negative for joint pain and muscle ache.   Skin:  Negative for rash, wound and laceration.   Allergic/Immunologic: Negative for environmental allergies.   Neurological:  Negative for headaches, altered mental status, numbness and tingling.   Psychiatric/Behavioral:  Negative for altered mental status.     "   Objective:     Physical Exam   Constitutional: He is oriented to person, place, and time. He appears well-developed. He is cooperative.  Non-toxic appearance. He does not appear ill. No distress.   HENT:   Head: Normocephalic and atraumatic.   Ears:   Right Ear: Hearing, tympanic membrane, external ear and ear canal normal.   Left Ear: Hearing, tympanic membrane, external ear and ear canal normal.   Nose: Nose normal. No mucosal edema, rhinorrhea or nasal deformity. No epistaxis. Right sinus exhibits no maxillary sinus tenderness and no frontal sinus tenderness. Left sinus exhibits no maxillary sinus tenderness and no frontal sinus tenderness.   Mouth/Throat: Uvula is midline, oropharynx is clear and moist and mucous membranes are normal. No trismus in the jaw. Normal dentition. No uvula swelling. No oropharyngeal exudate, posterior oropharyngeal edema or posterior oropharyngeal erythema.   Eyes: Conjunctivae and lids are normal. No scleral icterus.   Neck: Trachea normal and phonation normal. Neck supple. No edema present. No erythema present. No neck rigidity present.   Cardiovascular: Normal rate, regular rhythm, normal heart sounds and normal pulses.   Pulmonary/Chest: Effort normal and breath sounds normal. No respiratory distress. He has no decreased breath sounds. He has no rhonchi.   Abdominal: Normal appearance.   Musculoskeletal: Normal range of motion.         General: No deformity. Normal range of motion.   Neurological: He is alert and oriented to person, place, and time. He exhibits normal muscle tone. Coordination normal.   Skin: Skin is warm, dry, intact, not diaphoretic and not pale.   Psychiatric: His speech is normal and behavior is normal. Judgment and thought content normal.   Nursing note and vitals reviewed.      Results for orders placed or performed in visit on 10/30/23   SARS Coronavirus 2 Antigen, POCT Manual Read   Result Value Ref Range    SARS Coronavirus 2 Antigen Negative Negative      Acceptable Yes          Assessment:     1. Exposure to COVID-19 virus        Plan:       Exposure to COVID-19 virus  -     SARS Coronavirus 2 Antigen, POCT Manual Read      Patient Instructions   COVID SWAB IS NEGATIVE   RETURN FOR ANY CONCERNS OR PROBLEMS   WORK NOTE GIVEN

## 2023-11-24 ENCOUNTER — HOSPITAL ENCOUNTER (EMERGENCY)
Facility: HOSPITAL | Age: 58
Discharge: HOME | End: 2023-11-24
Attending: EMERGENCY MEDICINE
Payer: MEDICAID

## 2023-11-24 VITALS
TEMPERATURE: 98 F | HEART RATE: 96 BPM | HEIGHT: 71 IN | OXYGEN SATURATION: 98 % | BODY MASS INDEX: 30.8 KG/M2 | DIASTOLIC BLOOD PRESSURE: 99 MMHG | SYSTOLIC BLOOD PRESSURE: 155 MMHG | RESPIRATION RATE: 18 BRPM | WEIGHT: 220 LBS

## 2023-11-24 DIAGNOSIS — J06.9 URI WITH COUGH AND CONGESTION: Primary | ICD-10-CM

## 2023-11-24 LAB
CTP QC/QA: YES
INFLUENZA A ANTIGEN, POC: NEGATIVE
INFLUENZA B ANTIGEN, POC: NEGATIVE
POC RAPID STREP A: NEGATIVE
SARS-COV-2 RDRP RESP QL NAA+PROBE: NEGATIVE

## 2023-11-24 PROCEDURE — 87804 INFLUENZA ASSAY W/OPTIC: CPT | Mod: 59,ER

## 2023-11-24 PROCEDURE — 87635 SARS-COV-2 COVID-19 AMP PRB: CPT | Mod: ER | Performed by: EMERGENCY MEDICINE

## 2023-11-24 PROCEDURE — 99284 EMERGENCY DEPT VISIT MOD MDM: CPT | Mod: ER

## 2023-11-24 RX ORDER — FLUTICASONE PROPIONATE 50 MCG
2 SPRAY, SUSPENSION (ML) NASAL DAILY
Qty: 16 G | Refills: 0 | Status: SHIPPED | OUTPATIENT
Start: 2023-11-24

## 2023-11-24 RX ORDER — MONTELUKAST SODIUM 10 MG/1
10 TABLET ORAL NIGHTLY
Qty: 30 TABLET | Refills: 0 | Status: SHIPPED | OUTPATIENT
Start: 2023-11-24 | End: 2023-12-24

## 2023-11-24 RX ORDER — LORATADINE 10 MG/1
10 TABLET ORAL EVERY MORNING
Qty: 60 TABLET | Refills: 0 | Status: SHIPPED | OUTPATIENT
Start: 2023-11-24 | End: 2024-11-23

## 2023-11-24 RX ORDER — PROMETHAZINE HYDROCHLORIDE AND DEXTROMETHORPHAN HYDROBROMIDE 6.25; 15 MG/5ML; MG/5ML
5 SYRUP ORAL NIGHTLY PRN
Qty: 50 ML | Refills: 0 | Status: SHIPPED | OUTPATIENT
Start: 2023-11-24 | End: 2023-12-04

## 2023-11-24 RX ORDER — BENZONATATE 100 MG/1
100 CAPSULE ORAL 3 TIMES DAILY PRN
Qty: 20 CAPSULE | Refills: 0 | Status: SHIPPED | OUTPATIENT
Start: 2023-11-24 | End: 2023-12-04

## 2023-11-24 NOTE — ED PROVIDER NOTES
"Encounter Date: 11/24/2023       History     Chief Complaint   Patient presents with    URI     C/o nasal congestion, dry cough, and sore throat x2 days. Reports taking NyQuil with no relief.     Denies known sick contacts  Quit smoking 4 years ago    The history is provided by the patient.   URI  The primary symptoms include sore throat and cough (mild, nonproductive). Primary symptoms do not include fever, fatigue, headaches, ear pain, abdominal pain, nausea, vomiting or myalgias. The current episode started two days ago. This is a new problem. The problem has not changed since onset.  The sore throat began 2 days ago. The sore throat has been gradually improving since its onset. The sore throat is not accompanied by trouble swallowing, drooling, hoarse voice or stridor.   The cough is non-productive.   Symptoms associated with the illness include congestion and rhinorrhea. The illness is not associated with chills, plugged ear sensation or sinus pressure. The following treatments were addressed: A decongestant was ineffective (Nyquil).     Review of patient's allergies indicates:  No Known Allergies  Past Medical History:   Diagnosis Date    Chest pain 10/2019    Cigarette smoker     GERD (gastroesophageal reflux disease)     Hyperlipidemia     used to take medication    Hypertension      Past Surgical History:   Procedure Laterality Date    NO PAST SURGERIES  03/06/2020     History reviewed. No pertinent family history.  Social History     Tobacco Use    Smoking status: Former     Current packs/day: 0.50     Types: Cigarettes    Smokeless tobacco: Never   Substance Use Topics    Alcohol use: Yes     Alcohol/week: 21.0 standard drinks of alcohol     Types: 21 Cans of beer per week     Comment: 3/6/20: "about 3, everyday after I get off work"    Drug use: No     Review of Systems   Constitutional:  Negative for activity change, appetite change, chills, fatigue and fever.   HENT:  Positive for congestion, " rhinorrhea and sore throat. Negative for drooling, ear pain, hoarse voice, sinus pressure, sneezing, trouble swallowing and voice change.    Eyes:  Negative for visual disturbance.   Respiratory:  Positive for cough (mild, nonproductive). Negative for shortness of breath and stridor.    Cardiovascular:  Negative for chest pain and leg swelling.   Gastrointestinal:  Negative for abdominal pain, nausea and vomiting.   Musculoskeletal:  Negative for myalgias.   Neurological:  Negative for weakness and headaches.       Physical Exam     Initial Vitals [11/24/23 0451]   BP Pulse Resp Temp SpO2   (!) 155/99 96 18 97.8 °F (36.6 °C) 98 %      MAP       --         Physical Exam    Nursing note and vitals reviewed.  Constitutional: He appears well-developed and well-nourished. He is not diaphoretic. No distress.   HENT:   Head: Normocephalic and atraumatic.   Right Ear: Tympanic membrane is not injected, not erythematous and not bulging. No middle ear effusion.   Left Ear: Tympanic membrane is not injected, not erythematous and not bulging.  No middle ear effusion.   Mouth/Throat: Uvula is midline, oropharynx is clear and moist and mucous membranes are normal. No trismus in the jaw. No uvula swelling.   Tonsils symmetrically enlarged 1+ without erythema or exudate.  There is no tongue swelling, throat swelling, sublingual swelling, submandibular swelling, muffled phonation or pooling of oral secretions.   Eyes: Conjunctivae are normal.   Neck: Phonation normal. No stridor present.   Normal range of motion.  Cardiovascular:  Regular rhythm and intact distal pulses.           Pulmonary/Chest: Effort normal. No accessory muscle usage or stridor. No tachypnea. No respiratory distress. He has no decreased breath sounds. He has no wheezes. He has no rhonchi. He has no rales.   Abdominal: He exhibits no distension. There is no abdominal tenderness.   Musculoskeletal:         General: No tenderness. Normal range of motion.       Cervical back: Normal range of motion.     Neurological: He is alert and oriented to person, place, and time. He has normal strength. Gait normal. GCS eye subscore is 4. GCS verbal subscore is 5. GCS motor subscore is 6.   Skin: Skin is warm and intact.   Psychiatric: He has a normal mood and affect.         ED Course   Procedures  Labs Reviewed   SARS-COV-2 RDRP GENE    Narrative:     This test utilizes isothermal nucleic acid amplification technology to detect the SARS-CoV-2 RdRp nucleic acid segment. The analytical sensitivity (limit of detection) is 500 copies/swab.     A POSITIVE result is indicative of the presence of SARS-CoV-2 RNA; clinical correlation with patient history and other diagnostic information is necessary to determine patient infection status.    A NEGATIVE result means that SARS-CoV-2 nucleic acids are not present above the limit of detection. A NEGATIVE result should be treated as presumptive. It does not rule out the possibility of COVID-19 and should not be the sole basis for treatment decisions. If COVID-19 is strongly suspected based on clinical and exposure history, re-testing using an alternate molecular assay should be considered.     This test is only for use under the Food and Drug Administration s Emergency Use Authorization (EUA).     Commercial kits are provided by [employer]. Performance characteristics of the EUA have been independently verified by [facility] Department of Pathology and Laboratory Medicine.   _________________________________________________________________   The authorized Fact Sheet for Healthcare Providers and the authorized Fact Sheet for Patients of the ID NOW COVID-19 are available on the FDA website:    https://www.fda.gov/media/655506/download      https://www.fda.gov/media/524767/download      POCT INFLUENZA A/B MOLECULAR   POCT STREP A MOLECULAR   POCT STREP A, RAPID   POCT RAPID INFLUENZA A/B          Imaging Results    None           Medications - No data to display  Medical Decision Making  Amount and/or Complexity of Data Reviewed  Labs: ordered.    Risk  OTC drugs.  Prescription drug management.                          Medical Decision Making:   Differential Diagnosis:   COVID 19 infection, influenza infection, strep pharyngitis, acute otitis media, sinusitis, tonsillitis, rhinosinusitis, bronchiolitis, bronchitis, other viral illness, and others    Clinical Tests:   Lab Tests: Ordered and Reviewed  ED Management:  Rapid strep test negative, rapid influenza test negative and rapid COVID-19 test negative.  Test results, outpatient management plan, outpatient PCP follow-up and ED return precautions discussed with patient with understanding and agreement.           Clinical Impression:  Final diagnoses:  [J06.9] URI with cough and congestion (Primary)          ED Disposition Condition    Discharge Stable          ED Prescriptions       Medication Sig Dispense Start Date End Date Auth. Provider    fluticasone propionate (FLONASE) 50 mcg/actuation nasal spray 2 sprays (100 mcg total) by Each Nostril route once daily. 16 g 11/24/2023 -- Vesna Younger MD    montelukast (SINGULAIR) 10 mg tablet Take 1 tablet (10 mg total) by mouth every evening. 30 tablet 11/24/2023 12/24/2023 Vesna Younger MD    loratadine (CLARITIN) 10 mg tablet Take 1 tablet (10 mg total) by mouth every morning. 60 tablet 11/24/2023 11/23/2024 Vesna Younger MD    promethazine-dextromethorphan (PROMETHAZINE-DM) 6.25-15 mg/5 mL Syrp Take 5 mLs by mouth nightly as needed (cough). 50 mL 11/24/2023 12/4/2023 Vesna Younger MD    benzonatate (TESSALON) 100 MG capsule Take 1 capsule (100 mg total) by mouth 3 (three) times daily as needed for Cough. 20 capsule 11/24/2023 12/4/2023 Vesna Younger MD          Follow-up Information       Follow up With Specialties Details Why Contact Info    The nearest emergency department.  Go to  As needed, If symptoms worsen     Your PCP  Call  on 11/28/2023 to schedule an appointment, for re-evaluation of today's complaint, and ongoing care              Vesna Younger MD  11/24/23 9148

## 2023-12-23 ENCOUNTER — HOSPITAL ENCOUNTER (EMERGENCY)
Facility: HOSPITAL | Age: 58
Discharge: HOME | End: 2023-12-23
Attending: EMERGENCY MEDICINE

## 2023-12-23 VITALS
WEIGHT: 220 LBS | TEMPERATURE: 98 F | DIASTOLIC BLOOD PRESSURE: 89 MMHG | HEART RATE: 85 BPM | SYSTOLIC BLOOD PRESSURE: 131 MMHG | OXYGEN SATURATION: 98 % | BODY MASS INDEX: 30.68 KG/M2 | RESPIRATION RATE: 20 BRPM

## 2023-12-23 DIAGNOSIS — R21 RASH AND NONSPECIFIC SKIN ERUPTION: Primary | ICD-10-CM

## 2023-12-23 PROCEDURE — 96372 THER/PROPH/DIAG INJ SC/IM: CPT

## 2023-12-23 PROCEDURE — 25000003 PHARM REV CODE 250: Mod: ER

## 2023-12-23 PROCEDURE — 63600175 PHARM REV CODE 636 W HCPCS: Mod: ER

## 2023-12-23 PROCEDURE — 99284 EMERGENCY DEPT VISIT MOD MDM: CPT | Mod: ER

## 2023-12-23 RX ORDER — METHYLPREDNISOLONE SOD SUCC 125 MG
125 VIAL (EA) INJECTION
Status: COMPLETED | OUTPATIENT
Start: 2023-12-23 | End: 2023-12-23

## 2023-12-23 RX ORDER — DIPHENHYDRAMINE HCL 25 MG
25 CAPSULE ORAL EVERY 6 HOURS PRN
Qty: 20 CAPSULE | Refills: 0 | Status: SHIPPED | OUTPATIENT
Start: 2023-12-23

## 2023-12-23 RX ORDER — METHYLPREDNISOLONE 4 MG/1
TABLET ORAL
Qty: 1 EACH | Refills: 0 | Status: SHIPPED | OUTPATIENT
Start: 2023-12-23 | End: 2024-01-13

## 2023-12-23 RX ORDER — FAMOTIDINE 20 MG/1
20 TABLET, FILM COATED ORAL
Status: COMPLETED | OUTPATIENT
Start: 2023-12-23 | End: 2023-12-23

## 2023-12-23 RX ADMIN — METHYLPREDNISOLONE SODIUM SUCCINATE 125 MG: 125 INJECTION, POWDER, FOR SOLUTION INTRAMUSCULAR; INTRAVENOUS at 02:12

## 2023-12-23 RX ADMIN — FAMOTIDINE 20 MG: 20 TABLET ORAL at 02:12

## 2023-12-23 NOTE — ED PROVIDER NOTES
"Encounter Date: 12/23/2023    SCRIBE #1 NOTE: I, Jennifer Walls, am scribing for, and in the presence of,  Eric Etienne PA-C. I have scribed the following portions of the note - Other sections scribed: HPI,ROS,PE.       History     Chief Complaint   Patient presents with    Rash     Pt started with rash  a few days ago   +itching  Pt is working on construction site      Braeden Rowland is a 58 y.o. male with PMHx of GERD, HLD and HTN, who presents to the ED for chief complaint of rash and "knot to groin" onset 2 days ago. Patient reports that he has a rash to bilateral arms, chest. Patient reports that the rash is itchy but denies any discharge coming from it. Patient endorses taking ibuprofen 800 mg with no alleviation. Patient reports that he is an  and states that he is currently working on a new house that could have "anything" around it such as "contaminated water". Patient denies any changes to his daily routine, new detergents, colognes or body washes. Denies any new medications including antibiotics. Patient denies fever or chills. Denies nausea or vomiting. Denies SOB. Denies trouble swallowing.     The history is provided by the patient. No  was used.     Review of patient's allergies indicates:  No Known Allergies  Past Medical History:   Diagnosis Date    Chest pain 10/2019    Cigarette smoker     GERD (gastroesophageal reflux disease)     Hyperlipidemia     used to take medication    Hypertension      Past Surgical History:   Procedure Laterality Date    NO PAST SURGERIES  03/06/2020     History reviewed. No pertinent family history.  Social History     Tobacco Use    Smoking status: Former     Current packs/day: 0.50     Types: Cigarettes    Smokeless tobacco: Never   Substance Use Topics    Alcohol use: Yes     Alcohol/week: 21.0 standard drinks of alcohol     Types: 21 Cans of beer per week     Comment: 3/6/20: "about 3, everyday after I get off work"    Drug use: " No     Review of Systems   Constitutional:  Negative for chills and fever.   HENT:  Negative for sore throat and trouble swallowing.    Respiratory:  Negative for shortness of breath.    Cardiovascular:  Negative for chest pain.   Gastrointestinal:  Negative for abdominal pain, nausea and vomiting.   Genitourinary:         (+) Knot to groin area     Musculoskeletal:  Negative for neck pain and neck stiffness.   Skin:  Positive for rash.   Neurological:  Negative for dizziness, light-headedness, numbness and headaches.       Physical Exam     Initial Vitals [12/23/23 1258]   BP Pulse Resp Temp SpO2   (!) 134/91 82 20 97.9 °F (36.6 °C) 98 %      MAP       --         Physical Exam    Nursing note and vitals reviewed.  Constitutional: He appears well-developed and well-nourished.   HENT:   Head: Normocephalic and atraumatic.   Right Ear: External ear normal.   Left Ear: External ear normal.   Neck: Carotid bruit is not present.   Normal range of motion.  Cardiovascular:  Normal rate, regular rhythm, normal heart sounds and intact distal pulses.     Exam reveals no gallop and no friction rub.       No murmur heard.  Pulmonary/Chest: Breath sounds normal. No respiratory distress. He has no wheezes. He has no rhonchi. He has no rales.   Abdominal: Abdomen is soft. Bowel sounds are normal. He exhibits no distension. There is no abdominal tenderness. There is no rebound and no guarding.   Genitourinary:    Genitourinary Comments: Chaperoned by Angela CENTENO. Penis is circumcised and is without lesions or discharge.  No tenderness to palpation over epididymis bilaterally.  There is mild tenderness over the origin of the sartorius muscle.  No inguinal hernia.  No inguinal lymphadenopathy.       Musculoskeletal:         General: Normal range of motion.      Cervical back: Normal range of motion.     Neurological: He is alert and oriented to person, place, and time. GCS score is 15. GCS eye subscore is 4. GCS verbal subscore is 5.  GCS motor subscore is 6.   Skin: Rash noted. Rash is maculopapular (to forearms and chest). There is erythema.   Erythematous maculopapular rash to bilateral upper extremities and chest.  No lesions or rash noted to palms and soles bilaterally.   Psychiatric: He has a normal mood and affect.         ED Course   Procedures  Labs Reviewed - No data to display       Imaging Results    None          Medications   methylPREDNISolone sodium succinate injection 125 mg (125 mg Intramuscular Given 12/23/23 1430)   famotidine tablet 20 mg (20 mg Oral Given 12/23/23 1430)     Medical Decision Making  This is an emergent evaluation of a 58-year-old male with a past medical history of hypertension, hyperlipidemia, GERD who presents to the emergency department for evaluation of rash to bilateral upper extremities, chest, knot to groin x 2 days.  Physical exam reveals erythematous maculopapular rash to bilateral upper extremities and chest.  No lesions or rash noted to palms and soles bilaterally. Penis is circumcised and is without lesions or discharge.  No tenderness to palpation over epididymis bilaterally.  There is mild tenderness over the origin of the sartorius muscle.  No inguinal lymphadenopathy. Regular rate rhythm without murmurs.  No carotid bruits appreciated on exam. Lungs are clear to auscultation bilaterally.  Abdomen is soft, nontender, non distended, with normal bowel sounds.  Differential diagnosis includes but is not limited to contact dermatitis, tinea, eczema, allergic reaction.  Considered anaphylaxis but highly doubtful given well-appearing patient who is breathing comfortably on exam.Given history and physical exam findings, will treat for contact dermatitis.  Ordered Solu-Medrol and Pepcid.  Will send home with Medrol Dosepak, Benadryl for itching.  Patient reports he noticed not in groin area after heavy lifting.  No hernia on exam.  Encourage patient to take Tylenol and Motrin over-the-counter for pain.  Patient is very well appearing, and in no acute distress. Vital signs are reassuring here in the emergency department, patient is afebrile, breathing comfortable, satting 98 % on room air. Patient/Caregiver is stable for discharge at this time. Patient/Caregiver verbalize understanding of care plan. All questions and concerns were addressed. Discussed strict return precautions with the patient/caregiver. Instructed follow up with primary care provider within 1 week.      Eric Etienne PA-C    DISCLAIMER: This note was prepared with [employer] voice recognition transcription software. Garbled syntax, mangled pronouns, and other bizarre constructions may be attributed to that software system.     Risk  OTC drugs.  Prescription drug management.            Scribe Attestation:   Scribe #1: I performed the above scribed service and the documentation accurately describes the services I performed. I attest to the accuracy of the note.              I, Eric Etienne PA-C, personally performed the services described in this documentation.  All medical record entries made by the scribe were at my direction and in my presence.  I have reviewed the chart and agree that the record reflects my personal performance and is accurate and complete.                   Clinical Impression:  Final diagnoses:  [R21] Rash and nonspecific skin eruption (Primary)          ED Disposition Condition    Discharge Stable          ED Prescriptions       Medication Sig Dispense Start Date End Date Auth. Provider    diphenhydrAMINE (BENADRYL) 25 mg capsule Take 1 capsule (25 mg total) by mouth every 6 (six) hours as needed for Itching or Allergies. 20 capsule 12/23/2023 -- Eric Etienne PA-C    methylPREDNISolone (MEDROL DOSEPACK) 4 mg tablet Follow directions on packaging 1 each 12/23/2023 1/13/2024 Eric Etienne PA-C          Follow-up Information       Follow up With Specialties Details Why Contact Info    Nubia Rome FNP-C Family Medicine    [address] [facility] [address]  [phone]      [facility] Go to  As needed, If symptoms worsen, or new symptoms develop [address] [address] [phone]             Eric Etienne PA-C  12/23/23 1522

## 2023-12-23 NOTE — DISCHARGE INSTRUCTIONS

## 2024-06-08 ENCOUNTER — HOSPITAL ENCOUNTER (EMERGENCY)
Facility: HOSPITAL | Age: 59
Discharge: HOME | End: 2024-06-08
Attending: EMERGENCY MEDICINE
Payer: COMMERCIAL

## 2024-06-08 VITALS
OXYGEN SATURATION: 99 % | DIASTOLIC BLOOD PRESSURE: 85 MMHG | RESPIRATION RATE: 17 BRPM | TEMPERATURE: 98 F | SYSTOLIC BLOOD PRESSURE: 154 MMHG | WEIGHT: 220.44 LBS | HEIGHT: 71 IN | BODY MASS INDEX: 30.86 KG/M2 | HEART RATE: 84 BPM

## 2024-06-08 DIAGNOSIS — R07.9 CHEST PAIN, UNSPECIFIED TYPE: Primary | ICD-10-CM

## 2024-06-08 DIAGNOSIS — M79.18 MUSCULOSKELETAL PAIN: ICD-10-CM

## 2024-06-08 DIAGNOSIS — R07.9 CHEST PAIN: ICD-10-CM

## 2024-06-08 PROBLEM — R73.03 PREDIABETES: Status: ACTIVE | Noted: 2023-08-30

## 2024-06-08 PROBLEM — K21.9 GERD WITHOUT ESOPHAGITIS: Status: ACTIVE | Noted: 2023-08-30

## 2024-06-08 LAB
ALBUMIN SERPL-MCNC: 4 G/DL (ref 3.3–5.5)
ALP SERPL-CCNC: 63 U/L (ref 42–141)
BILIRUB SERPL-MCNC: 0.6 MG/DL (ref 0.2–1.6)
BUN SERPL-MCNC: 12 MG/DL (ref 7–22)
CALCIUM SERPL-MCNC: 10.1 MG/DL (ref 8–10.3)
CHLORIDE SERPL-SCNC: 105 MMOL/L (ref 98–108)
CREAT SERPL-MCNC: 0.8 MG/DL (ref 0.6–1.2)
GLUCOSE SERPL-MCNC: 97 MG/DL (ref 73–118)
HCT, POC: NORMAL
HGB, POC: NORMAL (ref 14–18)
MCH, POC: NORMAL
MCHC, POC: NORMAL
MCV, POC: NORMAL
MPV, POC: NORMAL
POC ALT (SGPT): 28 U/L (ref 10–47)
POC AST (SGOT): 32 U/L (ref 11–38)
POC B-TYPE NATRIURETIC PEPTIDE: <5
POC CARDIAC TROPONIN I: 0 NG/ML (ref 0–0.08)
POC CARDIAC TROPONIN I: 0 NG/ML (ref 0–0.08)
POC PLATELET COUNT: NORMAL
POC TCO2: 28 MMOL/L (ref 18–33)
POTASSIUM BLD-SCNC: 4.2 MMOL/L (ref 3.6–5.1)
PROTEIN, POC: 8.4 G/DL (ref 6.4–8.1)
RBC, POC: NORMAL
RDW, POC: NORMAL
SAMPLE: NORMAL
SAMPLE: NORMAL
SODIUM BLD-SCNC: 145 MMOL/L (ref 128–145)
WBC, POC: NORMAL

## 2024-06-08 PROCEDURE — 25000003 PHARM REV CODE 250: Mod: ER | Performed by: EMERGENCY MEDICINE

## 2024-06-08 PROCEDURE — 85025 COMPLETE CBC W/AUTO DIFF WBC: CPT | Mod: ER

## 2024-06-08 PROCEDURE — 80053 COMPREHEN METABOLIC PANEL: CPT | Mod: ER

## 2024-06-08 PROCEDURE — 93005 ELECTROCARDIOGRAM TRACING: CPT | Mod: ER

## 2024-06-08 PROCEDURE — 83880 ASSAY OF NATRIURETIC PEPTIDE: CPT | Mod: ER

## 2024-06-08 PROCEDURE — 99284 EMERGENCY DEPT VISIT MOD MDM: CPT | Mod: 25,ER

## 2024-06-08 PROCEDURE — 93010 ELECTROCARDIOGRAM REPORT: CPT | Mod: 76,,, | Performed by: INTERNAL MEDICINE

## 2024-06-08 PROCEDURE — 93010 ELECTROCARDIOGRAM REPORT: CPT | Mod: ,,, | Performed by: INTERNAL MEDICINE

## 2024-06-08 PROCEDURE — 84484 ASSAY OF TROPONIN QUANT: CPT | Mod: ER

## 2024-06-08 RX ORDER — ACETAMINOPHEN 500 MG
500 TABLET ORAL EVERY 6 HOURS PRN
Qty: 30 TABLET | Refills: 0 | Status: SHIPPED | OUTPATIENT
Start: 2024-06-08

## 2024-06-08 RX ORDER — METHOCARBAMOL 500 MG/1
1000 TABLET, FILM COATED ORAL 3 TIMES DAILY
Qty: 30 TABLET | Refills: 0 | Status: SHIPPED | OUTPATIENT
Start: 2024-06-08 | End: 2024-06-13

## 2024-06-08 RX ORDER — ASPIRIN 325 MG
325 TABLET, DELAYED RELEASE (ENTERIC COATED) ORAL
Status: COMPLETED | OUTPATIENT
Start: 2024-06-08 | End: 2024-06-08

## 2024-06-08 RX ORDER — IBUPROFEN 600 MG/1
600 TABLET ORAL EVERY 6 HOURS PRN
Qty: 20 TABLET | Refills: 0 | Status: SHIPPED | OUTPATIENT
Start: 2024-06-08

## 2024-06-08 RX ADMIN — ASPIRIN 325 MG: 325 TABLET, COATED ORAL at 05:06

## 2024-06-08 NOTE — Clinical Note
"Braeden WEST"Radha Rowland was seen and treated in our emergency department on 6/8/2024.  He may return to work on 06/11/2024.       If you have any questions or concerns, please don't hesitate to call.      Lacy Fairchild, DO"

## 2024-06-08 NOTE — ED PROVIDER NOTES
"Encounter Date: 6/8/2024       History     Chief Complaint   Patient presents with    Chest Pain     C/O L SIDED SHOULDER PAIN WITH CHEST PAIN X 1 DAY     58-year-old male with past medical history of pre-diabetes, high cholesterol, and hypertension presents to the ED with chief complaint of chest pain that started yesterday.  Patient states it has an achy pain in the left upper chest that radiates to the left shoulder.  He was picking up a heavy cabinet around noon yesterday noticed she was having the left-sided 4/10 achy pain radiating to left shoulder.  The pain resolved.  Patient has no current pain.  Patient states he does not smoke cigarettes he drinks beer daily and does not use marijuana cocaine or street drugs.  Patient states he has allergic to no medicines.  Patient has been taking all his prescription medicines for hypertension and high cholesterol as prescribed.    The history is provided by the patient.     Review of patient's allergies indicates:  No Known Allergies  Past Medical History:   Diagnosis Date    Chest pain 10/2019    Cigarette smoker     GERD (gastroesophageal reflux disease)     Hyperlipidemia     used to take medication    Hypertension      Past Surgical History:   Procedure Laterality Date    NO PAST SURGERIES  03/06/2020     No family history on file.  Social History     Tobacco Use    Smoking status: Former     Current packs/day: 0.50     Types: Cigarettes    Smokeless tobacco: Never   Substance Use Topics    Alcohol use: Yes     Alcohol/week: 21.0 standard drinks of alcohol     Types: 21 Cans of beer per week     Comment: 3/6/20: "about 3, everyday after I get off work"    Drug use: No     Review of Systems   Constitutional:  Negative for fever.   HENT:  Negative for sore throat.    Respiratory:  Negative for shortness of breath.    Cardiovascular:  Positive for chest pain.   Gastrointestinal:  Negative for nausea.   Genitourinary:  Negative for dysuria.   Musculoskeletal:  Positive " for arthralgias. Negative for back pain.   Skin:  Negative for rash.   Neurological:  Negative for weakness.   Hematological:  Does not bruise/bleed easily.   All other systems reviewed and are negative.      Physical Exam     Initial Vitals [06/08/24 0532]   BP Pulse Resp Temp SpO2   (!) 158/88 84 20 98.2 °F (36.8 °C) 98 %      MAP       --         Physical Exam    Nursing note and vitals reviewed.  Constitutional: He appears well-developed and well-nourished. He is not diaphoretic. No distress.   HENT:   Head: Normocephalic and atraumatic.   Right Ear: External ear normal.   Left Ear: External ear normal.   Nose: Nose normal.   Mouth/Throat: Oropharynx is clear and moist.   Eyes: EOM are normal. Pupils are equal, round, and reactive to light. Right eye exhibits no discharge. Left eye exhibits no discharge.   Neck: Neck supple.   Normal range of motion.  Cardiovascular:  Normal rate, regular rhythm, normal heart sounds and intact distal pulses.     Exam reveals no gallop and no friction rub.       No murmur heard.  Pulmonary/Chest: Breath sounds normal. No respiratory distress. He has no wheezes. He has no rhonchi. He has no rales. He exhibits tenderness.   Abdominal: Abdomen is soft. Bowel sounds are normal. He exhibits no distension. There is no abdominal tenderness. There is no rebound and no guarding.   Musculoskeletal:         General: No tenderness or edema. Normal range of motion.      Cervical back: Normal range of motion and neck supple.     Neurological: He is alert and oriented to person, place, and time. No cranial nerve deficit or sensory deficit.   Skin: Skin is warm. No rash noted. No pallor.   Psychiatric: He has a normal mood and affect.         ED Course   Procedures  Labs Reviewed   POCT CMP - Abnormal; Notable for the following components:       Result Value    Protein, POC 8.4 (*)     All other components within normal limits   TROPONIN ISTAT   TROPONIN ISTAT   POCT CBC   POCT CMP   POCT  "TROPONIN   POCT B-TYPE NATRIURETIC PEPTIDE (BNP)   POCT TROPONIN          Imaging Results              X-Ray Chest AP Portable (Final result)  Result time 06/08/24 06:30:46      Final result by Ad Maxwell MD (06/08/24 06:30:46)                   Impression:      No acute cardiopulmonary finding identified on this single view.      Electronically signed by: Ad Maxwell MD  Date:    06/08/2024  Time:    06:30               Narrative:    EXAMINATION:  XR CHEST AP PORTABLE    CLINICAL HISTORY:  Provided history is "  Chest pain, unspecified".    TECHNIQUE:  One view of the chest.    COMPARISON:  10/15/2022.    FINDINGS:  Cardiac wires overlie the chest.  Cardiomediastinal silhouette is magnified by portable technique and appears similar to prior studies.  No confluent area of consolidation.  No sizable pleural effusion.  No pneumothorax.                                       Medications   aspirin EC tablet 325 mg (325 mg Oral Given 6/8/24 0554)     Medical Decision Making  Amount and/or Complexity of Data Reviewed  Labs: ordered.    Risk  OTC drugs.  Prescription drug management.    Medical Decision Making:    This is an evaluation of a 58 y.o. male that presents to the Emergency Department for   Chief Complaint   Patient presents with    Chest Pain     C/O L SIDED SHOULDER PAIN WITH CHEST PAIN X 1 DAY         The patient is a non-toxic and well appearing patient. On physical exam, patient appears well hydrated with moist mucus membranes. Breath sounds are clear and equal bilaterally with no adventitious breath sounds, tachypnea or respiratory distress. Regular rate and rhythm. No murmurs. Abdomen soft and non tender. Patient is tolerating PO without difficulty. Physical exam otherwise as above.     I have reviewed vital signs and nursing notes.   Vital Signs Are Reassuring.     Based on the patient's symptoms, I am considering and evaluating for the following differential diagnoses:  STEMI, NSTEMI, cardiac " arrhythmia, pneumothorax, chest wall strain, and rib fracture.    Consider hospitalization for:  Chest pain    Patient is agreeable to transfer and admission to [facility] if necessary    ED Course:Treatment in the ED included Physical Exam and medications given in ED  Medications   aspirin EC tablet 325 mg (325 mg Oral Given 6/8/24 0554)   .   Patient reports feeling better after treatment in the ER.       External Data/Documents Reviewed: Previous medical records and vital signs reviewed, see HPI and Physical exam.   Labs: ordered and reviewed.  Troponin within normal limits at 0.00.  Radiology: ordered as indicated and reviewed.  No pneumothorax  ECG/medicine tests: ordered and independent interpretation performed by Dr. Lacy Fairchild DO. Decision-making details documented in ED Course.   Cardiac monitor placed for chest pain. Monitor shows Normal Sinus Rhythm with  rate of 85. Interpreted by Dr. Lacy Fairchild DO.  Patient presents with chest pain .  Troponin is negative.  No STEMI on EKG and no acute issues on chest x-ray. Chest pain unlikely to be cardiac in origin.  I recommend follow up with Cardiology in 1 day for further evaluation of chest pain. Discussed need to return to the ED if chest pain worsens or does not resolve.  Ambulatory referral placed for Cardiology.    Risk  Diagnosis or treatment significantly limited by the following social determinants of health: Body mass index is 30.75 kg/m².     In shared decision making with the patient, we discussed treatment, prescriptions, labs, and imaging results.    Discharge home with   ED Prescriptions       Medication Sig Dispense Start Date End Date Auth. Provider    methocarbamoL (ROBAXIN) 500 MG Tab Take 2 tablets (1,000 mg total) by mouth 3 (three) times daily. for 5 days 30 tablet 6/8/2024 6/13/2024 Lacy Fairchild DO    acetaminophen (TYLENOL) 500 MG tablet Take 1 tablet (500 mg total) by mouth every 6 (six) hours as needed for Pain (As needed for  mild-to-moderate pain). 30 tablet 6/8/2024 -- Lacy Fairchild DO    ibuprofen (ADVIL,MOTRIN) 600 MG tablet Take 1 tablet (600 mg total) by mouth every 6 (six) hours as needed for Pain (Take with food as needed for mild-to-moderate pain). 20 tablet 6/8/2024 -- Lacy Fairchild DO          Fill and take prescriptions as directed.  Return to the ED if symptoms worsen or do not resolve.   Answered questions and discussed discharge plan.    Patient reports resolution  of symptoms and is ready for discharge.  Follow up with PCP/specialist in 1 day        At time of discharge patient is awake alert oriented x4 speaking clearly in full sentences and moving all 4 extremities.     The following labs and imaging were reviewed:        Admission on 06/08/2024   Component Date Value Ref Range Status    POC B-Type Natriuretic Peptide 06/08/2024 <5   Final    POC Cardiac Troponin I 06/08/2024 0.00  0.00 - 0.08 ng/mL Final    Sample 06/08/2024 unknown   Final    Comment: A single negative troponin is insufficient to rule out myocardial infarction.  The use of a serial sampling protocol is recommended practice. Correlate results with reference intervals established for methodology used. Point of care and core laboratory   troponin results are not interchangeable.      Albumin, POC 06/08/2024 4.0  3.3 - 5.5 g/dL Final    Alkaline Phosphatase, POC 06/08/2024 63  42 - 141 U/L Final    ALT (SGPT), POC 06/08/2024 28  10 - 47 U/L Final    AST (SGOT), POC 06/08/2024 32  11 - 38 U/L Final    POC BUN 06/08/2024 12  7 - 22 mg/dL Final    Calcium, POC 06/08/2024 10.1  8.0 - 10.3 mg/dL Final    POC Chloride 06/08/2024 105  98 - 108 mmol/L Final    POC Creatinine 06/08/2024 0.8  0.6 - 1.2 mg/dL Final    POC Glucose 06/08/2024 97  73 - 118 mg/dL Final    POC Potassium 06/08/2024 4.2  3.6 - 5.1 mmol/L Final    POC Sodium 06/08/2024 145  128 - 145 mmol/L Final    Bilirubin, POC 06/08/2024 0.6  0.2 - 1.6 mg/dL Final    POC TCO2 06/08/2024 28  18 - 33  "mmol/L Final    Protein, POC 06/08/2024 8.4 (H)  6.4 - 8.1 g/dL Final    POC Cardiac Troponin I 06/08/2024 0.00  0.00 - 0.08 ng/mL Final    Sample 06/08/2024 unknown   Final    Comment: A single negative troponin is insufficient to rule out myocardial infarction.  The use of a serial sampling protocol is recommended practice. Correlate results with reference intervals established for methodology used. Point of care and core laboratory   troponin results are not interchangeable.          Imaging Results              X-Ray Chest AP Portable (Final result)  Result time 06/08/24 06:30:46      Final result by Ad Maxwell MD (06/08/24 06:30:46)                   Impression:      No acute cardiopulmonary finding identified on this single view.      Electronically signed by: Ad Maxwell MD  Date:    06/08/2024  Time:    06:30               Narrative:    EXAMINATION:  XR CHEST AP PORTABLE    CLINICAL HISTORY:  Provided history is "  Chest pain, unspecified".    TECHNIQUE:  One view of the chest.    COMPARISON:  10/15/2022.    FINDINGS:  Cardiac wires overlie the chest.  Cardiomediastinal silhouette is magnified by portable technique and appears similar to prior studies.  No confluent area of consolidation.  No sizable pleural effusion.  No pneumothorax.                                      Additional MDM:   Heart Score:    History:          Slightly suspicious.  ECG:             Nonspecific repolarisation disturbance  Age:               45-65 years  Risk factors: 1-2 risk factors  Troponin:       Less than or equal to normal limit  Heart Score = 3                ED Course as of 06/08/24 0827   Sat Jun 08, 2024   0600 I assumed care of this patient at 6:00 a.m..  Patient reports he is chest pain-free.  Troponin is within normal limits at 0.00.  Patient reports he came here because he is worried about his heart. [RF]   0712 EKG interpreted by Dr. Fairchild.  No STEMI.  Normal sinus rhythm, ventricular rate 82.  Normal " axis.  Abnormal EKG, nonspecific T-wave, .  When compared to previous EKG.  When compared to previous EKG done on 05/24/2023 rate has decreased by 5 beats per minute today [RF]   0809 EKG interpreted by Dr. Fairchild.  No STEMI.  Normal sinus rhythm, ventricular rate of 74.  Normal axis.  Abnormal EKG, nonspecific T-wave abnormality, QTC normal at 410.  When compared to earlier EKG rate has decreased by 8 beats per minute [RF]      ED Course User Index  [RF] Lacy Fairchild DO                             Clinical Impression:  Final diagnoses:  [R07.9] Chest pain, unspecified type (Primary)  [M79.18] Musculoskeletal pain          ED Disposition Condition    Discharge Stable          ED Prescriptions       Medication Sig Dispense Start Date End Date Auth. Provider    methocarbamoL (ROBAXIN) 500 MG Tab Take 2 tablets (1,000 mg total) by mouth 3 (three) times daily. for 5 days 30 tablet 6/8/2024 6/13/2024 Lacy Fairchild DO    acetaminophen (TYLENOL) 500 MG tablet Take 1 tablet (500 mg total) by mouth every 6 (six) hours as needed for Pain (As needed for mild-to-moderate pain). 30 tablet 6/8/2024 -- Lacy Fairchild DO    ibuprofen (ADVIL,MOTRIN) 600 MG tablet Take 1 tablet (600 mg total) by mouth every 6 (six) hours as needed for Pain (Take with food as needed for mild-to-moderate pain). 20 tablet 6/8/2024 -- Lacy Fairchild DO          Follow-up Information       Follow up With Specialties Details Why Contact Info    Ryan Davenport MD Cardiology Schedule an appointment as soon as possible for a visit  For further evaluation of chest pain [address] [facility]  [place] [address]  [phone]      Nubia Rome, ARIELP-C Family Medicine Schedule an appointment as soon as possible for a visit in 1 day  [address] [facility] [place] [address] [phone]      [facility] - Emergency Dept Emergency Medicine Go to  Please go to [facility] emergency department if symptoms worsen [address] [facility] -  [facility] [address]  [phone]             Lacy Fairchild DO  06/08/24 0827

## 2024-06-10 LAB
OHS QRS DURATION: 84 MS
OHS QRS DURATION: 88 MS
OHS QTC CALCULATION: 410 MS
OHS QTC CALCULATION: 413 MS

## 2024-06-12 LAB — POC B-TYPE NATRIURETIC PEPTIDE: <5 PG/ML (ref 0–100)

## 2024-07-10 NOTE — LETTER
October 30, 2023      [facility]  [address] [facility], [address]  [place] [address]  Phone: [phone]  Fax: [phone]       Patient: Braeden Rowland   YOB: 1965  Date of Visit: 10/30/2023    To Whom It May Concern:    Opal Rowland  was at [facility] on 10/30/2023. The patient may return to work/school on 10/31/2023 with no restrictions. If you have any questions or concerns, or if I can be of further assistance, please do not hesitate to contact me.    COVID NEGATIVE    Sincerely,      Carlo Taylor MD      see self care for any notable changes    [employer] Access Code Date Issued         Objective Information/Functional Measures/Assessment     Progressed exercises to  improve pt's activity tolerance and educate him t/o session on sxs. Therapeutic rest breaks implemented t/o tx (ie: L/S stretches, rhomboid stretches) to reduce exacerbation of symptoms   ADDED: DEVONTE row w/ 4 kg KB,  sled push w/o wt, marching/farmers carry w/ 5 lb plates in ea hand to improve dynamic stability for increase activity tolerance   INCREASE: resistance w/ TB drills to improve postural mms strength/stability     C/S AROM end of session:   Rot L 50° R 48°  Ext 20°      Patient will continue to benefit from skilled PT / OT services to modify and progress therapeutic interventions, analyze and address functional mobility deficits, analyze and address ROM deficits, analyze and address strength deficits, analyze and address soft tissue restrictions, analyze and cue for proper movement patterns, and analyze and modify for postural abnormalities to address functional deficits and attain remaining goals.    Progress toward goals / Updated goals:  []  See Progress Note/Recertification    NEXT PN DUE RECERT DATE   08/02/24 -     # AUTH VISITS AUTH EXPIRATION DATE   N/A 12/31/24     Progressing towards improving functional/general strength by adding new drills/exercises that challenge global movement 07/10/24    Goal/Measure of Progress  Goal Met?   1.  Patient will reduce max pain to 4/10 in order to perform lifting and reaching more comfortably.   Status at last Eval: 8/10 Current Status: 7-8/10 Progressing   2.  Patient will improve general strength to 4+/5 to return to functional tasks around his home.   Status at last Eval: Progressing flexion and adduction; no change L ABD; regression R ABD  Current Status: 4/5 HF with associated back pain; WNL for Hip Abd/Add Progressing   3.  Patient will improve FOTO score to 52 for improved functional mobility

## 2025-02-03 ENCOUNTER — HOSPITAL ENCOUNTER (EMERGENCY)
Facility: HOSPITAL | Age: 60
Discharge: HOME | End: 2025-02-03
Attending: INTERNAL MEDICINE
Payer: COMMERCIAL

## 2025-02-03 VITALS
OXYGEN SATURATION: 96 % | RESPIRATION RATE: 15 BRPM | TEMPERATURE: 98 F | BODY MASS INDEX: 29.4 KG/M2 | WEIGHT: 210 LBS | HEART RATE: 79 BPM | HEIGHT: 71 IN | DIASTOLIC BLOOD PRESSURE: 86 MMHG | SYSTOLIC BLOOD PRESSURE: 136 MMHG

## 2025-02-03 DIAGNOSIS — E86.0 DEHYDRATION: Primary | ICD-10-CM

## 2025-02-03 DIAGNOSIS — R00.2 PALPITATIONS: ICD-10-CM

## 2025-02-03 LAB
ALBUMIN SERPL-MCNC: 3.8 G/DL (ref 3.3–5.5)
ALP SERPL-CCNC: 76 U/L (ref 42–141)
BILIRUB SERPL-MCNC: 0.5 MG/DL (ref 0.2–1.6)
BUN SERPL-MCNC: 16 MG/DL (ref 7–22)
CALCIUM SERPL-MCNC: 10 MG/DL (ref 8–10.3)
CHLORIDE SERPL-SCNC: 105 MMOL/L (ref 98–108)
CREAT SERPL-MCNC: 1 MG/DL (ref 0.6–1.2)
CTP QC/QA: YES
GLUCOSE SERPL-MCNC: 100 MG/DL (ref 73–118)
HCT, POC: NORMAL
HGB, POC: NORMAL (ref 14–18)
INFLUENZA A ANTIGEN, POC: NEGATIVE
INFLUENZA B ANTIGEN, POC: NEGATIVE
MCH, POC: NORMAL
MCHC, POC: NORMAL
MCV, POC: NORMAL
MPV, POC: NORMAL
OHS QRS DURATION: 82 MS
OHS QTC CALCULATION: 460 MS
POC ALT (SGPT): 33 U/L (ref 10–47)
POC AST (SGOT): 39 U/L (ref 11–38)
POC CARDIAC TROPONIN I: 0 NG/ML (ref 0–0.08)
POC PLATELET COUNT: NORMAL
POC TCO2: 29 MMOL/L (ref 18–33)
POTASSIUM BLD-SCNC: 4.8 MMOL/L (ref 3.6–5.1)
PROTEIN, POC: 8 G/DL (ref 6.4–8.1)
RBC, POC: NORMAL
RDW, POC: NORMAL
SAMPLE: NORMAL
SARS-COV-2 RDRP RESP QL NAA+PROBE: NEGATIVE
SODIUM BLD-SCNC: 140 MMOL/L (ref 128–145)
WBC, POC: NORMAL

## 2025-02-03 PROCEDURE — 87635 SARS-COV-2 COVID-19 AMP PRB: CPT | Mod: ER | Performed by: INTERNAL MEDICINE

## 2025-02-03 PROCEDURE — 80053 COMPREHEN METABOLIC PANEL: CPT | Mod: ER

## 2025-02-03 PROCEDURE — 93005 ELECTROCARDIOGRAM TRACING: CPT | Mod: ER

## 2025-02-03 PROCEDURE — 99283 EMERGENCY DEPT VISIT LOW MDM: CPT | Mod: 25,ER

## 2025-02-03 PROCEDURE — 85025 COMPLETE CBC W/AUTO DIFF WBC: CPT | Mod: ER

## 2025-02-03 PROCEDURE — 84484 ASSAY OF TROPONIN QUANT: CPT | Mod: ER

## 2025-02-03 PROCEDURE — 93010 ELECTROCARDIOGRAM REPORT: CPT | Mod: ,,, | Performed by: INTERNAL MEDICINE

## 2025-02-03 PROCEDURE — 87804 INFLUENZA ASSAY W/OPTIC: CPT | Mod: ER

## 2025-02-03 NOTE — ED PROVIDER NOTES
"Encounter Date: 2/3/2025       History     Chief Complaint   Patient presents with    Irregular Heart Beat     Pt states he woke up an hour ago and felt like his "heart was racing". Pt states he no longer feels it at present. Pt denies n/v/d or sob      59-year-old male presents to the emergency department complaining of sensation of "heart racing" 2 hours ago.  He states this sensation lasted a few minutes, then went away, and that he has felt similar episodes previously.  He endorses decreased water intake over the past 24 hours and states "I drank beer all day yesterday".  Denies fever/chills/nausea/vomiting/chest pain/shortness breath.    The history is provided by the patient. No  was used.     Review of patient's allergies indicates:  No Known Allergies  Past Medical History:   Diagnosis Date    Chest pain 10/2019    Cigarette smoker     GERD (gastroesophageal reflux disease)     Hyperlipidemia     used to take medication    Hypertension      Past Surgical History:   Procedure Laterality Date    NO PAST SURGERIES  03/06/2020     No family history on file.  Social History     Tobacco Use    Smoking status: Former     Current packs/day: 0.50     Types: Cigarettes    Smokeless tobacco: Never   Substance Use Topics    Alcohol use: Yes     Alcohol/week: 21.0 standard drinks of alcohol     Types: 21 Cans of beer per week     Comment: 3/6/20: "about 3, everyday after I get off work"    Drug use: No     Review of Systems   Constitutional:  Negative for chills and fever.   Respiratory:  Negative for shortness of breath.    Cardiovascular:  Positive for palpitations. Negative for chest pain and leg swelling.   Gastrointestinal:  Negative for diarrhea, nausea and vomiting.   Musculoskeletal:  Negative for back pain and neck pain.   Neurological:  Negative for dizziness, seizures, syncope, facial asymmetry, speech difficulty, light-headedness, numbness and headaches.   All other systems reviewed and " are negative.      Physical Exam     Initial Vitals [02/03/25 0023]   BP Pulse Resp Temp SpO2   (!) 167/96 95 18 98 °F (36.7 °C) 98 %      MAP       --         Physical Exam    Nursing note and vitals reviewed.  Constitutional: He appears well-developed and well-nourished. He is not diaphoretic. No distress.   HENT:   Head: Normocephalic and atraumatic.   Right Ear: External ear normal.   Left Ear: External ear normal. Mouth/Throat: Oropharynx is clear and moist.   Eyes: Conjunctivae and EOM are normal.   Neck: Neck supple.   Normal range of motion.  Cardiovascular:  Normal rate, regular rhythm, normal heart sounds and intact distal pulses.           Pulmonary/Chest: Breath sounds normal. No respiratory distress. He has no wheezes.   Abdominal: Abdomen is soft. Bowel sounds are normal. He exhibits no distension. There is no abdominal tenderness.   Musculoskeletal:         General: Normal range of motion.      Cervical back: Normal range of motion and neck supple.     Neurological: He is alert and oriented to person, place, and time. He has normal strength.   Skin: Skin is warm and dry. Capillary refill takes less than 2 seconds.   Psychiatric: He has a normal mood and affect. Thought content normal.         ED Course   Procedures  Labs Reviewed   POCT CMP - Abnormal       Result Value    Albumin, POC 3.8      Alkaline Phosphatase, POC 76      ALT (SGPT), POC 33      AST (SGOT), POC 39 (*)     POC BUN 16      Calcium, POC 10.0      POC Chloride 105      POC Creatinine 1.0      POC Glucose 100      POC Potassium 4.8      POC Sodium 140      Bilirubin, POC 0.5      POC TCO2 29      Protein, POC 8.0     TROPONIN ISTAT    POC Cardiac Troponin I 0.00      Sample unknown     POCT CBC    Hematocrit        Hemoglobin        RBC        WBC        MCV        MCH, POC        MCHC        RDW-CV        Platelet Count, POC        MPV       SARS-COV-2 RDRP GENE    POC Rapid COVID Negative       Acceptable Yes       "Narrative:     This test utilizes isothermal nucleic acid amplification technology to detect the SARS-CoV-2 RdRp nucleic acid segment. The analytical sensitivity (limit of detection) is 500 copies/swab.     A POSITIVE result is indicative of the presence of SARS-CoV-2 RNA; clinical correlation with patient history and other diagnostic information is necessary to determine patient infection status.    A NEGATIVE result means that SARS-CoV-2 nucleic acids are not present above the limit of detection. A NEGATIVE result should be treated as presumptive. It does not rule out the possibility of COVID-19 and should not be the sole basis for treatment decisions. If COVID-19 is strongly suspected based on clinical and exposure history, re-testing using an alternate molecular assay should be considered.     Commercial kits are provided by [employer].       POCT INFLUENZA A/B MOLECULAR   POCT CMP   POCT TROPONIN   POCT RAPID INFLUENZA A/B    Influenza B Ag negative      Inflenza A Ag negative       EKG Readings: (Independently Interpreted)   Normal sinus rhythm, nonspecific T-wave changes, normal ST, rate of 94 beats per minute, prolonged QT, no STEMI       Imaging Results    None          Medications - No data to display  Medical Decision Making  59-year-old male presents to the emergency department complaining of sensation of "heart racing" 2 hours ago.  He states this sensation lasted a few minutes, then went away, and that he has felt similar episodes previously.  He endorses decreased water intake over the past 24 hours and states "I drank beer all day yesterday".  Denies fever/chills/nausea/vomiting/chest pain/shortness breath.  Course of ED stay:   Rapid flu and COVID were negative.  CBC and CMP are reassuring.  Troponin was normal.  EKG shows no acute ischemic findings.  1 L water p.o. push was given.  Patient states he feels back to his baseline prior to discharge.  Instructions for dehydration and " palpitations were given prior to discharge and the patient was advised to follow-up with his primary care physician (he states he has an appointment for this morning) for re-evaluation/return to the emergency department if condition worsens.    Amount and/or Complexity of Data Reviewed  Labs: ordered.                                      Clinical Impression:  Final diagnoses:  [R00.2] Palpitations  [E86.0] Dehydration (Primary)          ED Disposition Condition    Discharge Stable          ED Prescriptions    None       Follow-up Information       Follow up With Specialties Details Why Contact Info    Nubia Rome FNP-C Family Medicine Go today For reevaluation [address] [facility] [id number]  [phone]               Aneudy Curtis MD  02/03/25 0146